# Patient Record
Sex: FEMALE | Race: WHITE | Employment: OTHER | URBAN - NONMETROPOLITAN AREA
[De-identification: names, ages, dates, MRNs, and addresses within clinical notes are randomized per-mention and may not be internally consistent; named-entity substitution may affect disease eponyms.]

---

## 2017-01-03 ENCOUNTER — TELEPHONE (OUTPATIENT)
Dept: FAMILY MEDICINE | Facility: OTHER | Age: 27
End: 2017-01-03

## 2017-11-26 ENCOUNTER — HEALTH MAINTENANCE LETTER (OUTPATIENT)
Age: 27
End: 2017-11-26

## 2017-11-29 ENCOUNTER — TRANSFERRED RECORDS (OUTPATIENT)
Dept: HEALTH INFORMATION MANAGEMENT | Facility: CLINIC | Age: 27
End: 2017-11-29

## 2017-12-06 ENCOUNTER — TRANSFERRED RECORDS (OUTPATIENT)
Dept: HEALTH INFORMATION MANAGEMENT | Facility: CLINIC | Age: 27
End: 2017-12-06

## 2018-05-13 ENCOUNTER — TRANSFERRED RECORDS (OUTPATIENT)
Dept: HEALTH INFORMATION MANAGEMENT | Facility: CLINIC | Age: 28
End: 2018-05-13

## 2018-06-13 ENCOUNTER — TRANSFERRED RECORDS (OUTPATIENT)
Dept: HEALTH INFORMATION MANAGEMENT | Facility: CLINIC | Age: 28
End: 2018-06-13

## 2018-06-13 LAB — EJECTION FRACTION: 60 %

## 2018-07-26 ENCOUNTER — TRANSFERRED RECORDS (OUTPATIENT)
Dept: HEALTH INFORMATION MANAGEMENT | Facility: CLINIC | Age: 28
End: 2018-07-26

## 2018-08-05 ENCOUNTER — TRANSFERRED RECORDS (OUTPATIENT)
Dept: HEALTH INFORMATION MANAGEMENT | Facility: CLINIC | Age: 28
End: 2018-08-05

## 2018-08-06 ENCOUNTER — HOSPITAL ENCOUNTER (INPATIENT)
Facility: HOSPITAL | Age: 28
LOS: 37 days | Discharge: HOME | DRG: 885 | End: 2018-09-12
Attending: PSYCHIATRY & NEUROLOGY | Admitting: PSYCHIATRY & NEUROLOGY
Payer: MEDICARE

## 2018-08-06 DIAGNOSIS — F25.0 SCHIZOAFFECTIVE DISORDER, BIPOLAR TYPE (H): Primary | ICD-10-CM

## 2018-08-06 PROBLEM — F30.10 MANIC BEHAVIOR (H): Status: ACTIVE | Noted: 2018-08-06

## 2018-08-06 PROCEDURE — 25000132 ZZH RX MED GY IP 250 OP 250 PS 637: Mod: GY | Performed by: NURSE PRACTITIONER

## 2018-08-06 PROCEDURE — A9270 NON-COVERED ITEM OR SERVICE: HCPCS | Mod: GY | Performed by: NURSE PRACTITIONER

## 2018-08-06 PROCEDURE — 12400000 ZZH R&B MH

## 2018-08-06 PROCEDURE — A9270 NON-COVERED ITEM OR SERVICE: HCPCS | Mod: GY

## 2018-08-06 PROCEDURE — 12400011

## 2018-08-06 PROCEDURE — 99223 1ST HOSP IP/OBS HIGH 75: CPT | Mod: AI | Performed by: NURSE PRACTITIONER

## 2018-08-06 PROCEDURE — 25000132 ZZH RX MED GY IP 250 OP 250 PS 637: Mod: GY

## 2018-08-06 RX ORDER — OLANZAPINE 10 MG/2ML
10 INJECTION, POWDER, FOR SOLUTION INTRAMUSCULAR
Status: DISCONTINUED | OUTPATIENT
Start: 2018-08-06 | End: 2018-08-10

## 2018-08-06 RX ORDER — ALUMINA, MAGNESIA, AND SIMETHICONE 2400; 2400; 240 MG/30ML; MG/30ML; MG/30ML
30 SUSPENSION ORAL EVERY 4 HOURS PRN
Status: DISCONTINUED | OUTPATIENT
Start: 2018-08-06 | End: 2018-09-12 | Stop reason: HOSPADM

## 2018-08-06 RX ORDER — HYDROXYZINE HYDROCHLORIDE 25 MG/1
25 TABLET, FILM COATED ORAL EVERY 4 HOURS PRN
Status: DISCONTINUED | OUTPATIENT
Start: 2018-08-06 | End: 2018-08-22

## 2018-08-06 RX ORDER — VITAMIN A ACETATE, .BETA.-CAROTENE, ASCORBIC ACID, CHOLECALCIFEROL, .ALPHA.-TOCOPHEROL ACETATE, DL-, THIAMINE MONONITRATE, RIBOFLAVIN, NIACINAMIDE, PYRIDOXINE HYDROCHLORIDE, FOLIC ACID, CYANOCOBALAMIN, CALCIUM CARBONATE, FERROUS FUMARATE, ZINC OXIDE, AND CUPRIC OXIDE 2000; 2000; 120; 400; 22; 1.84; 3; 20; 10; 1; 12; 200; 27; 25; 2 [IU]/1; [IU]/1; MG/1; [IU]/1; MG/1; MG/1; MG/1; MG/1; MG/1; MG/1; UG/1; MG/1; MG/1; MG/1; MG/1
1 TABLET ORAL DAILY
Status: DISCONTINUED | OUTPATIENT
Start: 2018-08-07 | End: 2018-09-12 | Stop reason: HOSPADM

## 2018-08-06 RX ORDER — OLANZAPINE 10 MG/1
10 TABLET ORAL
Status: DISCONTINUED | OUTPATIENT
Start: 2018-08-06 | End: 2018-08-10

## 2018-08-06 RX ORDER — CELECOXIB 100 MG/1
200 CAPSULE ORAL 2 TIMES DAILY
Status: DISCONTINUED | OUTPATIENT
Start: 2018-08-06 | End: 2018-09-12 | Stop reason: HOSPADM

## 2018-08-06 RX ORDER — THERMOMETER, ELECTRONIC,ORAL
EACH MISCELLANEOUS PRN
COMMUNITY

## 2018-08-06 RX ORDER — HYDROXYZINE PAMOATE 50 MG/1
50-100 CAPSULE ORAL 3 TIMES DAILY PRN
Status: DISCONTINUED | OUTPATIENT
Start: 2018-08-06 | End: 2018-09-12 | Stop reason: HOSPADM

## 2018-08-06 RX ORDER — LORATADINE 10 MG/1
10 TABLET ORAL DAILY
Status: ON HOLD | COMMUNITY
End: 2018-08-27

## 2018-08-06 RX ORDER — MUPIROCIN 20 MG/G
OINTMENT TOPICAL PRN
Status: ON HOLD | COMMUNITY
End: 2018-08-27

## 2018-08-06 RX ORDER — PRENATAL VIT/IRON FUM/FOLIC AC 27MG-0.8MG
1 TABLET ORAL DAILY
COMMUNITY

## 2018-08-06 RX ORDER — AMANTADINE HYDROCHLORIDE 100 MG/1
100 CAPSULE, GELATIN COATED ORAL ONCE
Status: COMPLETED | OUTPATIENT
Start: 2018-08-06 | End: 2018-08-06

## 2018-08-06 RX ORDER — CHLORAL HYDRATE 500 MG
1 CAPSULE ORAL 2 TIMES DAILY
COMMUNITY

## 2018-08-06 RX ORDER — PHENOBARBITAL 32.4 MG/1
32.4 TABLET ORAL 3 TIMES DAILY
Status: DISCONTINUED | OUTPATIENT
Start: 2018-08-07 | End: 2018-08-14

## 2018-08-06 RX ORDER — LORAZEPAM 1 MG/1
TABLET ORAL
Status: COMPLETED
Start: 2018-08-06 | End: 2018-08-06

## 2018-08-06 RX ORDER — LIDOCAINE/PRILOCAINE 2.5 %-2.5%
CREAM (GRAM) TOPICAL PRN
COMMUNITY

## 2018-08-06 RX ORDER — ACETAMINOPHEN 325 MG/1
650 TABLET ORAL EVERY 4 HOURS PRN
Status: DISCONTINUED | OUTPATIENT
Start: 2018-08-06 | End: 2018-09-12 | Stop reason: HOSPADM

## 2018-08-06 RX ORDER — LORAZEPAM 1 MG/1
2 TABLET ORAL ONCE
Status: COMPLETED | OUTPATIENT
Start: 2018-08-06 | End: 2018-08-06

## 2018-08-06 RX ORDER — CHLORAL HYDRATE 500 MG
1 CAPSULE ORAL 2 TIMES DAILY
Status: DISCONTINUED | OUTPATIENT
Start: 2018-08-06 | End: 2018-08-21

## 2018-08-06 RX ORDER — LAMOTRIGINE 100 MG/1
100 TABLET ORAL 2 TIMES DAILY
Status: DISCONTINUED | OUTPATIENT
Start: 2018-08-06 | End: 2018-08-09

## 2018-08-06 RX ORDER — LORATADINE 10 MG/1
10 TABLET ORAL DAILY
Status: DISCONTINUED | OUTPATIENT
Start: 2018-08-07 | End: 2018-09-12 | Stop reason: HOSPADM

## 2018-08-06 RX ADMIN — LORAZEPAM 2 MG: 1 TABLET ORAL at 21:21

## 2018-08-06 RX ADMIN — OLANZAPINE 10 MG: 10 TABLET ORAL at 12:08

## 2018-08-06 RX ADMIN — NICOTINE POLACRILEX 4 MG: 2 GUM, CHEWING ORAL at 12:08

## 2018-08-06 RX ADMIN — AMANTADINE 100 MG: 100 CAPSULE ORAL at 21:33

## 2018-08-06 RX ADMIN — NICOTINE POLACRILEX 4 MG: 2 GUM, CHEWING ORAL at 18:02

## 2018-08-06 RX ADMIN — OLANZAPINE 10 MG: 10 TABLET ORAL at 18:02

## 2018-08-06 RX ADMIN — HYDROXYZINE HYDROCHLORIDE 25 MG: 25 TABLET ORAL at 19:37

## 2018-08-06 ASSESSMENT — ACTIVITIES OF DAILY LIVING (ADL)
CURRENT_FUNCTIONAL_LEVEL_COMMENT: NONE NOTED
TRANSFERRING: 0 - INDEPENDENT
BATHING: 0 - INDEPENDENT
LAUNDRY: UNABLE TO COMPLETE
DRESS: 0 - INDEPENDENT
FALL_HISTORY_WITHIN_LAST_SIX_MONTHS: NO
PRIOR_FUNCTIONAL_LEVEL_COMMENT: NONE NOTED
BATHING: 0-->INDEPENDENT
GROOMING: INDEPENDENT
DRESS: 0-->INDEPENDENT
RETIRED_EATING: 0-->INDEPENDENT
RETIRED_COMMUNICATION: 0-->UNDERSTANDS/COMMUNICATES WITHOUT DIFFICULTY
COMMUNICATION: 0 - UNDERSTANDS/COMMUNICATES WITHOUT DIFFICULTY
AMBULATION: 0-->INDEPENDENT
EATING: 0 - INDEPENDENT
TOILETING: 0 - INDEPENDENT
ORAL_HYGIENE: INDEPENDENT
SWALLOWING: 0 - SWALLOWS FOODS/LIQUIDS WITHOUT DIFFICULTY
CHANGE_IN_FUNCTIONAL_STATUS_SINCE_ONSET_OF_CURRENT_ILLNESS/INJURY: NO
SWALLOWING: 0-->SWALLOWS FOODS/LIQUIDS WITHOUT DIFFICULTY
DRESS: SCRUBS (BEHAVIORAL HEALTH);INDEPENDENT
TOILETING: 0-->INDEPENDENT
AMBULATION: 0 - INDEPENDENT
TRANSFERRING: 0-->INDEPENDENT
COGNITION: 0 - NO COGNITION ISSUES REPORTED

## 2018-08-06 NOTE — PLAN OF CARE
"ADMISSION NOTE    Reason for admission: Psychosis.  Safety concerns: Impaired Thought Process.  Risk for or history of violence: None noted at this time.     Patient arrived on unit from [facility] in [place] accompanied by transport staff and i[employer] on 8/6/2018 at 11:32 AM.   Status on arrival: Patient irritable with pressured speech.   /96  Pulse 94  Temp 98.4  F (36.9  C) (Tympanic)  Resp 18  Ht 1.651 m (5' 5\")  Wt 97.1 kg (214 lb)  SpO2 95%  BMI 35.61 kg/m2  Patient given tour of unit and Welcome to  unit papers given to patient, wanding completed, belongings inventoried, and admission assessment completed.   Patient's legal status on arrival is 72 hour hold. Appropriate legal rights discussed with and copy given to patient. Patient Bill of Rights discussed with and copy given to patient.   Patient denies SI, HI, and thoughts of self harm and contracts for safety while on unit. Full skin assessment completed.     Celi Melo  8/6/2018  12:35 PM    Nurse to Nurse Report-  Reports that patient was brought to ER with sister and friend d/t \"risky\" behavior. History of PTSD, bipolar, ADHD, polysubstance abuse, etc. Reports that patient is hypereligious and hypersexual upon admission with sister reporting patient has been \"asking strangers on the street to come up and have sex\". Patient currently has a port in place with chemotherapy done every Monday d/t breast cancer. Urine toxicity screen positive for amphetamines with a current prescription to Vyvanse. On a 72 hour hold.     Admission Report-   Patient appears very irritable on admission with pressured loud speech. Needs redirection during initial assessment and making comments like, \"you can ask me whatever, I may or may not answer with the truth\". Full skin assessment completed with scars across breast area d/t bilateral mastectomy in April of this year. Patient also reports a broken left foot 8 months ago with some skin " "discoloration noted. Patient refusing to answer basic assessment questions at this time. States, \"apparently I'm fucking psychotic again and am losing all my rights\". Patient did not appear to be responding to internal stimuli but difficulty tracking during conversation with this writer. This writer allowed patient to express feelings at this time and she accepted offer for PRN.   1208- Received PRN Zyprexa 10 mg.   Patient continues to appear agitated with questioning. Did sign YOANA form and given meal tray, eating 100%. By 1300, patient laying in bed to rest/nap stating \"can everyone just leave me the fuck alone for a few hours\". Continue to monitor at this time.   PTA medications verified through [facility] in [place].       "

## 2018-08-06 NOTE — PROGRESS NOTES
08/06/18 1314   Patient Belongings   Did you bring any home meds/supplements to the hospital?  No    List items sent to safe: none    All other belongings put in assigned cubby in belongings room.     I have reviewed my belongings list on admission and verify that it is correct.     Patient signature_______________________________    Second staff witness (if patient unable to sign) ______________________________       I have received all my belongings at discharge.    Patient signature________________________________    Marta  8/6/2018  1:15 PM

## 2018-08-06 NOTE — IP AVS SNAPSHOT
[facility]    [address] [place] [id number]    Phone:  [phone]    Fax:  [phone]                                       After Visit Summary   8/6/2018    Marti Javier    MRN: [id number]           After Visit Summary Signature Page     I have received my discharge instructions, and my questions have been answered. I have discussed any challenges I see with this plan with the nurse or doctor.    ..........................................................................................................................................  Patient/Patient Representative Signature      ..........................................................................................................................................  Patient Representative Print Name and Relationship to Patient    ..................................................               ................................................  Date                                   Time    ..........................................................................................................................................  Reviewed by Signature/Title    ...................................................              ..............................................  Date                                               Time          22EPIC Rev 08/18
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
## 2018-08-06 NOTE — PROGRESS NOTES
08/06/18 1258   Patient Belongings   Patient Belongings clothing;keys;jewelry;tote;shoes;other (see comments)   Disposition of Belongings Locker   Belongings Search Yes   Clothing Search Yes   Second Staff Paul   General Info Comment blue printed shirt, black pants, black shoes, sunglasses, purple tank top, orange purse, watch, vape, tangled necklaces with key, black dress, living sober book, dont sweat the small stuff book, Methodist book, came to believe book, blue notebook, dailt reflections book, lines of inspiration book, 2 bibles    List items sent to safe: none    All other belongings put in assigned cubby in belongings room.     I have reviewed my belongings list on admission and verify that it is correct.     Patient signature_______________________________    Second staff witness (if patient unable to sign) ______________________________       I have received all my belongings at discharge.    Patient signature________________________________    Marta  8/6/2018  1:04 PM

## 2018-08-06 NOTE — H&P
"Psychiatric Eval/H&P    Patient Name: Marti Javier   YOB: 1990  Age: 28 year old  [id number]    Primary Physician: Kranthi Perez   Completed by CORINE Medina   through [facility]  ARHMScurrently unknown  Primary psychiatrist/NP : unknown  Therapist unknown  Family contact: [facility]          CC: \"can you all just leave me alone\"    HPI   Marti Javier is a 28 year old never   female who is ucrrently under commitment with [facility]. She was brought to the ER by her sister fabrice friend. They had reported she has been hyper Shinto and hypersexual. She had been asking strangers on the street to have sex with her. She is currently going through chemo for breast cancer. [facility] has a port and gets infusions every Monday. She had a double mastectomy in April of 2018. Recent PET imaging does not indicate that it has metastasized to other areas other than local lymph node. She and I had quite a good rapport through the years I have known her. She is very short with me today. She is irritable when I meet with her though isnt appearing as angry as annoyed in attitude. She did not tell me she was currently under commitment and that her  has plans on extening it. She is extremely gaurded and tense. Will continue prn zyprexa. Likely give risperdal tomorrow.      PMFSPH     Past Psychiatric History: she has a number of commitments and when she is under a commitment, a cruz is usually necessary. She has never attempted suicide from what I remember though I will be reviewing her past records. Her sister has often been involved with her care and had been a good support for her in the past.      Social History: she refuses to give any information today though I do recall she has never been . She does have one child whom she gave up for adoption. From what I recall, I believe a family member is raising her son who is now about 4 years old. The " last I was aware, she was allowed to have contact with him and it was an open adoption.     Chemical Use History: has  A hx of alcohol abuse as well as benzodiazapine abuse and combining the two to increase effects. thc abuse methamphetamine abuse. unknwon when last cd treatment was though when I was wokring with her 2 years ago, she had gone to [facility] in the [facility] though I do not believe she completed thr program     Family Psychiatric History: no known family suicides     Medical History and ROS    Prior to Admission medications    Medication Sig Start Date End Date Taking? Authorizing Provider   ATOMOXETINE HCL PO Take 80 mg by mouth daily   Yes Reported, Patient   CELECOXIB PO Take 200 mg by mouth 2 times daily   Yes Reported, Patient   CLONAZEPAM PO Take 1 mg by mouth 2 times daily as needed for anxiety Take 1-1 1/2 tablets PRN BID   Yes Reported, Patient   fish oil-omega-3 fatty acids 1000 MG capsule Take 1 g by mouth 2 times daily   Yes Reported, Patient   HYDROXYZINE PAMOATE PO Take  mg by mouth 3 times daily as needed for anxiety   Yes Reported, Patient   LAMOTRIGINE PO Take 100 mg by mouth 2 times daily   Yes Reported, Patient   lidocaine-prilocaine (EMLA) cream Apply topically as needed for moderate pain   Yes Reported, Patient   Lisdexamfetamine Dimesylate (VYVANSE PO) Take 60 mg by mouth daily   Yes Reported, Patient   loratadine (CLARITIN) 10 MG tablet Take 10 mg by mouth daily   Yes Reported, Patient   mupirocin (BACTROBAN) 2 % ointment Apply topically as needed   Yes Reported, Patient   Prenatal Vit-Fe Fumarate-FA (PRENATAL MULTIVITAMIN PLUS IRON) 27-0.8 MG TABS per tablet Take 1 tablet by mouth daily   Yes Reported, Patient   RANITIDINE HCL PO Take 150 mg by mouth 2 times daily   Yes Reported, Patient   risperiDONE microspheres (RISPERDAL CONSTA) 50 MG injection Inject 50 mg into the muscle every 14 days Paperwork and patient report injection due again tomorrow on 8/7/18.   Yes  Reported, Patient   tolnaftate (TINACTIN) 1 % cream Apply topically as needed for irritation   Yes Reported, Patient     Allergies   Allergen Reactions     Azithromycin Anaphylaxis     Abilify [Aripiprazole] Unknown     Wellbutrin [Bupropion] Other (See Comments)     suicidal     Zithromax [Azithromycin Dihydrate]      Past Medical History:   Diagnosis Date     Illicit drug use     + THC, counseled 10/9/12. SS referral. UDS on Admit.     Insomnia  8/29/2012     Obesity      Positive urine drug screen 11/10/2014    positive for THC and positive for THC and amphetamines 10/2013     Psychosis 1/19/2015     Schizoaffective disorder, bipolar type 8/29/2012    bipolar type with psychotic features; [facility]     Seizure 8/6/2012     Substance abuse      Tobacco abuse      Uncomplicated asthma      Past Surgical History:   Procedure Laterality Date     genitle warts removed       PE TUBES         Current Medications   Prescriptions Prior to Admission   Medication Sig Dispense Refill Last Dose     ATOMOXETINE HCL PO Take 80 mg by mouth daily        CELECOXIB PO Take 200 mg by mouth 2 times daily        CLONAZEPAM PO Take 1 mg by mouth 2 times daily as needed for anxiety Take 1-1 1/2 tablets PRN BID        fish oil-omega-3 fatty acids 1000 MG capsule Take 1 g by mouth 2 times daily        HYDROXYZINE PAMOATE PO Take  mg by mouth 3 times daily as needed for anxiety        LAMOTRIGINE PO Take 100 mg by mouth 2 times daily        lidocaine-prilocaine (EMLA) cream Apply topically as needed for moderate pain        Lisdexamfetamine Dimesylate (VYVANSE PO) Take 60 mg by mouth daily        loratadine (CLARITIN) 10 MG tablet Take 10 mg by mouth daily        mupirocin (BACTROBAN) 2 % ointment Apply topically as needed        Prenatal Vit-Fe Fumarate-FA (PRENATAL MULTIVITAMIN PLUS IRON) 27-0.8 MG TABS per tablet Take 1 tablet by mouth daily        RANITIDINE HCL PO Take 150 mg by mouth 2 times  "daily        risperiDONE microspheres (RISPERDAL CONSTA) 50 MG injection Inject 50 mg into the muscle every 14 days Paperwork and patient report injection due again tomorrow on 8/7/18.        tolnaftate (TINACTIN) 1 % cream Apply topically as needed for irritation            Past Psychiatric Medications Tried: haldol: did very well on it though had significant akathesia    Physical Exam    She is under the blanket and shows only her head. She asks to be left alone and is angry. Will defer for now.     Review of Systems:  She will not answer any questions       MSE/PSYCH  PSYCHIATRIC EXAM  /96  Pulse 94  Temp 98.4  F (36.9  C) (Tympanic)  Resp 18  Ht 1.651 m (5' 5\")  Wt 97.1 kg (214 lb)  SpO2 95%  BMI 35.61 kg/m2  -Appearance/Behavior: angry   -Motor: psychomotor retardation  -Gait: intact though staff have reported restlesness  -Abnormal involuntary movements: none noted thoughvery difficult to assess  -Mood: irritable  -Affect: flat to angry  -Speech: refuses to speak much       -Thought process/associations: Flight of ideas.  -Thought content: paranoid delusions  -Perceptual disturbances: Frequent hallucinations..              -Suicidal/Homicidal Ideation: unknown  -Judgment: Poor and Limited.  -Insight: Poor.  *Orientation: time, place and person.  *Memory: intact  Attention: poor  *Language: fluent, no aphasias, able to repeat phrases and name objects. Vocab intact.  *Fund of information: appropriate for education; poo  *Cognitive functioning estimate: 2 - very impaired.     Labs:     Cbc shows low hgh and low hematocrit. Elevated  Rdw.   ast and alt elevated. Doesn't appear that this has been a chronic issue. First abnormal lfts appear this past june. PET of liver did not indicate CA though likely elevation due to fatty liver.   tsh 2.5   care everywhere was reviewed . Multiple diagnostic studies for breast CA with lymph node involvement.   Assessment/Impression: This is a 28 year old yo female " with schizoaffecitve disorder who Is under commitment carrie through [facility]. She is well known to our unit and she has followed up with me in clinic in the past.    Educated regarding medication indications, risks, benefits, side effects, contraindications and possible interactions. Verbally expressed understanding.     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 She is under civil commitment through [facility]      Anticipated length of stay one week

## 2018-08-06 NOTE — PLAN OF CARE
Face to face end of shift report received from Celi SOTOMAYOR RN. Rounding completed. Patient observed in ICU.    Deya Lawson  8/6/2018  3:45 PM

## 2018-08-06 NOTE — PLAN OF CARE
Received phone call from patient's  Angie Barrera.  Patient is under commitment starting 2/27/2018 through [facility].  Received Findings and Order for Commitment via fax from .  This was placed in patient's paper chart.  Per , an intent to revoke and commitment extension were filed today.  Case Manger also left a voicemail with  here.

## 2018-08-06 NOTE — IP AVS SNAPSHOT
MRN:[id number]                      After Visit Summary   8/6/2018    Marti Javier    MRN: [id number]           Thank you!     Thank you for choosing [facility] for your care. Our goal is always to provide you with excellent care. Hearing back from our patients is one way we can continue to improve our services. Please take a few minutes to complete the written survey that you may receive in the mail after you visit with us. Thank you!        Patient Information     Date Of Birth          1990        Designated Caregiver       Most Recent Value    Caregiver    Will someone help with your care after discharge? no      About your hospital stay     You were admitted on:  August 6, 2018 You last received care in the:  [facility]    You were discharged on:  September 12, 2018       Who to Call     For medical emergencies, please call 911.  For non-urgent questions about your medical care, please call your primary care provider or clinic, None          Attending Provider     Provider Specialty    Derrick Jj MD Psychiatry    Haily Tomlinson APRN CNP Nurse Practitioner       Primary Care Provider Fax #    Physician No Ref-Primary [phone]      Further instructions from your care team       Behavioral Discharge Planning and Instructions    Summary: Patient was admitted with increased mental health symptoms     Main Diagnosis: schizoaffecitve disorder, bipolar type, Alcohol use disordr, amount currently used unknown, history of severe, Amphetamine and methaphetamine use disorder, 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    Symptoms to Report: feeling more aggressive, increased confusion, losing more sleep, mood getting worse or thoughts of suicide    Lifestyle Adjustment: Take all medications as prescribed, meet with doctor/  medication provider, out patient therapist, , and [facility] worker as scheduled. Abstain from alcohol or any unprescribed drugs.    Psychiatry Follow-up:     [facility]  - Angie Barrera- cell: [phone]- As arranged   [address]   [address]  [place] [address]  Phone: [phone]  Fax: [phone]    [facility]  Therapy- Yanci Damon- Sept 14th @ 9am  Medication Management- Dr. Koroma- Sept 14th @ 10:20am   [address] [place] [address]    Therapy- Phone: [phone] (Fax)    Medication Management- Phone: [phone]   Fax: [phone]    [facility]   Therapy- Currently on leave for 6 months  [address] [place] [address]  Phone: [phone]      [facility]  PCP-Lety Monsivais- September 18th @ 11:30am- Waiting room A  Phone: [phone]  Fax: [phone]    Crisis Line: [phone]    [address]   [address]   [facility] [address]    [facility]   [address] [place], [place], [address]  Phone: [phone]      Resources:   Crisis Intervention: 953.381.3026 or 117-108-3554 (TTY: 369.551.9924).  Call anytime for help.  National [place] on Mental Illness (www.[place].jeromy.org): 757.647.9547 or 553-808-4336.  Suicide Awareness Voices of Education (SAVE) (www.save.org): 833-455-UCTU (5661)  National Suicide Prevention Line (www.mentalhealthmn.org): 133-643-GCPP (8795)  Mental Health Consumer/Survivor Network of [place] (www.mhcsn.net): 330.225.2243 or 529-767-0983  Mental Health Association of [place] (www.mentalhealth.org): 935.233.6555 or 973-301-7463    General Medication Instructions:   See your medication sheet(s) for instructions.   Take all medicines as directed.  Make no changes unless your doctor suggests them.   Go to all your doctor visits.  Be sure to have all your required lab tests. This way, your medicines can be refilled on  "time.  Do not use any drugs not prescribed by your doctor.  Avoid alcohol.      [place] Area:  [facility], Crisis stabilization [facility]- [phone]  [facility] Crisis Line: 1-287.549.8001  [facility]ates [facility] Family Peace: 710-6099  Sexual Assault Program of [facility]: [phone] or 1-172.629.5237  [place] [facility]: 1-153.607.4003 Ext: 279       Calls answered Mon-Fri-8:00 am--4:30 pm    [place]:  [facility] Family Peace: 1-841.269.1397  [facility] first call for help: 1-773.667.5564  [facility] Crisis [facility]:  (422) 973-5642      [place] Area:  Warm Line: 1-273.304.5385       Calls answered Tuesday--Saturday 4:00 pm--10:00 pm  [facility] Crisis Line - [phone]  [facility] 114-155-8969    [place] Statewide:  [place] Crisis and Referral Services: 1-398.975.2740  National Suicide Prevention Lifeline: 6-183-866-TALK (3579)   - ayn7hzha- Text \"Life\" to 80765  First Call for Help: 2-1-1  [facility] Helpline- 9-179-HIKT-HELP      Pending Results     No orders found from 8/4/2018 to 8/7/2018.            Statement of Approval     Ordered          09/11/18 0841  I have reviewed and agree with all the recommendations and orders detailed in this document.  EFFECTIVE NOW     Approved and electronically signed by:  Kusum Cota NP             Admission Information     Date & Time Provider Department Dept. Phone    8/6/2018 Haily Tomlinson APRN CNP [facility] [phone]      Your Vitals Were     Blood Pressure Pulse Temperature Respirations Height Weight    136/80 98 98  F (36.7  C) (Tympanic) 18 1.651 m (5' 5\") 103 kg (227 lb 1.6 oz)    Pulse Oximetry BMI (Body Mass Index)                98% 37.79 kg/m2          MyChart Information     [employer] lets you send messages to your doctor, view your test results, renew your prescriptions, schedule appointments and more. To sign up, go to [email or web address] . Click on \"Log in\" on the left side of the " "screen, which will take you to the Welcome page. Then click on \"Sign up Now\" on the right side of the page.     You will be asked to enter the access code listed below, as well as some personal information. Please follow the directions to create your username and password.     Your access code is: [email or web address]  Expires: 2018 10:44 AM     Your access code will  in 90 days. If you need help or a new code, please call your [facility] clinic or [phone].        Care EveryWhere ID     This is your Care EveryWhere ID. This could be used by other organizations to access your [facility] medical records  [id number]        Equal Access to Services     CLARENCE JOHNSON : Milan Rodríguez, jah kay, geovanna uriarte, pihll camargo . So [facility] [phone].    ATENCIÓN: Si habla español, tiene a downey disposición servicios gratuitos de asistencia lingüística. Llame al [phone].    We comply with applicable federal civil rights laws and [place] laws. We do not discriminate on the basis of race, color, national origin, age, disability, sex, sexual orientation, or gender identity.               Review of your medicines      START taking        Dose / Directions    amantadine 100 MG capsule   Commonly known as:  SYMMETREL        Dose:  100 mg   Take 1 capsule (100 mg) by mouth 2 times daily   Quantity:  60 capsule   Refills:  0       cholecalciferol 2000 units tablet        Dose:  2000 Units   Take 2,000 Units by mouth daily   Quantity:  30 tablet   Refills:  0       cloNIDine 0.1 MG tablet   Commonly known as:  CATAPRES        Dose:  0.1 mg   Take 1 tablet (0.1 mg) by mouth 3 times daily   Quantity:  90 tablet   Refills:  0       LORazepam 1 MG tablet   Commonly known as:  ATIVAN        Dose:  2 mg   Take 2 tablets (2 mg) by mouth 3 times daily as needed for anxiety   Quantity:  60 tablet   Refills:  0       risperiDONE 1 MG ODT tab   Commonly known as:  risperDAL " M-TABS        Dose:  1 mg   Place 1 tablet (1 mg) under the tongue 3 times daily as needed (anxiety or agitation)   Quantity:  90 tablet   Refills:  0       topiramate 25 MG tablet   Commonly known as:  TOPAMAX        Dose:  25 mg   Take 1 tablet (25 mg) by mouth every 12 hours   Quantity:  60 tablet   Refills:  0         CONTINUE these medicines which may have CHANGED, or have new prescriptions. If we are uncertain of the size of tablets/capsules you have at home, strength may be listed as something that might have changed.        Dose / Directions    gabapentin 300 MG capsule   Commonly known as:  NEURONTIN   This may have changed:    - medication strength  - when to take this  - reasons to take this        Dose:  600 mg   Take 2 capsules (600 mg) by mouth 4 times daily   Quantity:  240 capsule   Refills:  0       hydrOXYzine 50 MG capsule   Commonly known as:  VISTARIL   This may have changed:  medication strength        Dose:   mg   Take 1-2 capsules ( mg) by mouth 3 times daily as needed for anxiety   Quantity:  120 capsule   Refills:  0       lamoTRIgine 200 MG tablet   Commonly known as:  LaMICtal   This may have changed:    - medication strength  - how much to take        Dose:  200 mg   Take 1 tablet (200 mg) by mouth 2 times daily   Quantity:  60 tablet   Refills:  0       risperiDONE microspheres 50 MG injection   Commonly known as:  risperDAL CONSTA   This may have changed:  additional instructions        Dose:  50 mg   Start taking on:  9/18/2018   Inject 2 mLs (50 mg) into the muscle every 14 days   Quantity:  2 each   Refills:  0         CONTINUE these medicines which have NOT CHANGED        Dose / Directions    CELECOXIB PO        Dose:  200 mg   Take 200 mg by mouth 2 times daily   Refills:  0       cyanocobalamin 1000 MCG tablet   Commonly known as:  vitamin  B-12        Dose:  1000 mcg   Take 1,000 mcg by mouth daily   Refills:  0       fish oil-omega-3 fatty acids 1000 MG capsule         Dose:  1 g   Take 1 g by mouth 2 times daily   Refills:  0       lidocaine-prilocaine cream   Commonly known as:  EMLA        Apply topically as needed for moderate pain   Refills:  0       prenatal multivitamin plus iron 27-0.8 MG Tabs per tablet        Dose:  1 tablet   Take 1 tablet by mouth daily   Refills:  0       RANITIDINE HCL PO        Dose:  150 mg   Take 150 mg by mouth 2 times daily   Refills:  0       tolnaftate 1 % cream   Commonly known as:  TINACTIN        Apply topically as needed for irritation   Refills:  0       VITAMIN B6 PO        Dose:  100 mg   Take 100 mg by mouth daily   Refills:  0         STOP taking     ATOMOXETINE HCL PO           CLONAZEPAM PO           VYVANSE PO                Where to get your medicines      These medications were sent to [id number] - [address] [address]     Phone:  [phone]     amantadine 100 MG capsule    cholecalciferol 2000 units tablet    cloNIDine 0.1 MG tablet    gabapentin 300 MG capsule    hydrOXYzine 50 MG capsule    lamoTRIgine 200 MG tablet    risperiDONE 1 MG ODT tab    risperiDONE microspheres 50 MG injection    topiramate 25 MG tablet         Some of these will need a paper prescription and others can be bought over the counter. Ask your nurse if you have questions.     Bring a paper prescription for each of these medications     LORazepam 1 MG tablet                Protect others around you: Learn how to safely use, store and throw away your medicines at www.disposemymeds.org.             Medication List: This is a list of all your medications and when to take them. Check marks below indicate your daily home schedule. Keep this list as a reference.      Medications           Morning Afternoon Evening Bedtime As Needed    amantadine 100 MG capsule   Commonly known as:  SYMMETREL   Take 1 capsule (100 mg) by mouth 2 times daily   Last time this was given:   100 mg on 9/12/2018  8:20 AM                                CELECOXIB PO   Take 200 mg by mouth 2 times daily   Last time this was given:  200 mg on 9/12/2018  8:21 AM                                cholecalciferol 2000 units tablet   Take 2,000 Units by mouth daily   Last time this was given:  2,000 Units on 9/12/2018  8:11 AM                                cloNIDine 0.1 MG tablet   Commonly known as:  CATAPRES   Take 1 tablet (0.1 mg) by mouth 3 times daily   Last time this was given:  0.1 mg on 9/12/2018  8:18 AM                                cyanocobalamin 1000 MCG tablet   Commonly known as:  vitamin  B-12   Take 1,000 mcg by mouth daily   Last time this was given:  1,000 mcg on 9/12/2018  8:21 AM                                fish oil-omega-3 fatty acids 1000 MG capsule   Take 1 g by mouth 2 times daily   Last time this was given:  1 g on 9/12/2018  8:20 AM                                gabapentin 300 MG capsule   Commonly known as:  NEURONTIN   Take 2 capsules (600 mg) by mouth 4 times daily   Last time this was given:  600 mg on 9/12/2018  8:12 AM                                hydrOXYzine 50 MG capsule   Commonly known as:  VISTARIL   Take 1-2 capsules ( mg) by mouth 3 times daily as needed for anxiety   Last time this was given:  50 mg on 9/12/2018  1:04 AM                                lamoTRIgine 200 MG tablet   Commonly known as:  LaMICtal   Take 1 tablet (200 mg) by mouth 2 times daily   Last time this was given:  175 mg on 9/12/2018  8:14 AM                                lidocaine-prilocaine cream   Commonly known as:  EMLA   Apply topically as needed for moderate pain                                LORazepam 1 MG tablet   Commonly known as:  ATIVAN   Take 2 tablets (2 mg) by mouth 3 times daily as needed for anxiety   Last time this was given:  1.5 mg on 9/11/2018  6:06 PM                                prenatal multivitamin plus iron 27-0.8 MG Tabs per tablet   Take 1 tablet by mouth  daily                                RANITIDINE HCL PO   Take 150 mg by mouth 2 times daily   Last time this was given:  150 mg on 9/12/2018  8:13 AM                                risperiDONE 1 MG ODT tab   Commonly known as:  risperDAL M-TABS   Place 1 tablet (1 mg) under the tongue 3 times daily as needed (anxiety or agitation)   Last time this was given:  1 mg on 9/12/2018  1:04 AM                                risperiDONE microspheres 50 MG injection   Commonly known as:  risperDAL CONSTA   Inject 2 mLs (50 mg) into the muscle every 14 days   Start taking on:  9/18/2018   Last time this was given:  50 mg on 9/4/2018  9:19 AM                                tolnaftate 1 % cream   Commonly known as:  TINACTIN   Apply topically as needed for irritation                                topiramate 25 MG tablet   Commonly known as:  TOPAMAX   Take 1 tablet (25 mg) by mouth every 12 hours   Last time this was given:  25 mg on 9/12/2018  8:21 AM                                VITAMIN B6 PO   Take 100 mg by mouth daily

## 2018-08-07 PROCEDURE — 12400011

## 2018-08-07 PROCEDURE — 99233 SBSQ HOSP IP/OBS HIGH 50: CPT | Performed by: NURSE PRACTITIONER

## 2018-08-07 PROCEDURE — A9270 NON-COVERED ITEM OR SERVICE: HCPCS | Mod: GY | Performed by: NURSE PRACTITIONER

## 2018-08-07 PROCEDURE — 25000132 ZZH RX MED GY IP 250 OP 250 PS 637: Mod: GY | Performed by: NURSE PRACTITIONER

## 2018-08-07 PROCEDURE — 25000128 H RX IP 250 OP 636: Performed by: NURSE PRACTITIONER

## 2018-08-07 RX ORDER — AMANTADINE HYDROCHLORIDE 100 MG/1
100 CAPSULE, GELATIN COATED ORAL 2 TIMES DAILY
Status: DISCONTINUED | OUTPATIENT
Start: 2018-08-07 | End: 2018-09-12 | Stop reason: HOSPADM

## 2018-08-07 RX ORDER — PHENOBARBITAL 32.4 MG/1
32.4 TABLET ORAL 3 TIMES DAILY
Status: DISCONTINUED | OUTPATIENT
Start: 2018-08-07 | End: 2018-08-07

## 2018-08-07 RX ORDER — GABAPENTIN 300 MG/1
600 CAPSULE ORAL 3 TIMES DAILY
Status: DISCONTINUED | OUTPATIENT
Start: 2018-08-07 | End: 2018-08-08

## 2018-08-07 RX ORDER — NICOTINE 21 MG/24HR
1 PATCH, TRANSDERMAL 24 HOURS TRANSDERMAL DAILY
Status: DISCONTINUED | OUTPATIENT
Start: 2018-08-07 | End: 2018-09-12 | Stop reason: HOSPADM

## 2018-08-07 RX ADMIN — LAMOTRIGINE 100 MG: 100 TABLET ORAL at 20:06

## 2018-08-07 RX ADMIN — LAMOTRIGINE 100 MG: 100 TABLET ORAL at 08:22

## 2018-08-07 RX ADMIN — NICOTINE POLACRILEX 4 MG: 2 GUM, CHEWING ORAL at 08:23

## 2018-08-07 RX ADMIN — PHENOBARBITAL 32.4 MG: 32.4 TABLET ORAL at 14:14

## 2018-08-07 RX ADMIN — RISPERIDONE 50 MG: KIT at 09:28

## 2018-08-07 RX ADMIN — ACETAMINOPHEN 650 MG: 325 TABLET, FILM COATED ORAL at 17:07

## 2018-08-07 RX ADMIN — HYDROXYZINE PAMOATE 100 MG: 50 CAPSULE ORAL at 19:24

## 2018-08-07 RX ADMIN — LORATADINE 10 MG: 10 TABLET ORAL at 08:22

## 2018-08-07 RX ADMIN — CELECOXIB 200 MG: 100 CAPSULE ORAL at 20:05

## 2018-08-07 RX ADMIN — GABAPENTIN 600 MG: 300 CAPSULE ORAL at 20:10

## 2018-08-07 RX ADMIN — OLANZAPINE 10 MG: 10 TABLET ORAL at 12:27

## 2018-08-07 RX ADMIN — CELECOXIB 200 MG: 100 CAPSULE ORAL at 08:22

## 2018-08-07 RX ADMIN — RANITIDINE 150 MG: 150 TABLET ORAL at 08:22

## 2018-08-07 RX ADMIN — PHENOBARBITAL 32.4 MG: 32.4 TABLET ORAL at 08:22

## 2018-08-07 RX ADMIN — NICOTINE 1 PATCH: 21 PATCH, EXTENDED RELEASE TRANSDERMAL at 10:44

## 2018-08-07 RX ADMIN — PHENOBARBITAL 32.4 MG: 32.4 TABLET ORAL at 20:05

## 2018-08-07 RX ADMIN — Medication 1 G: at 20:05

## 2018-08-07 RX ADMIN — AMANTADINE 100 MG: 100 CAPSULE ORAL at 22:52

## 2018-08-07 RX ADMIN — Medication 1 G: at 08:22

## 2018-08-07 RX ADMIN — RANITIDINE 150 MG: 150 TABLET ORAL at 20:05

## 2018-08-07 ASSESSMENT — ACTIVITIES OF DAILY LIVING (ADL)
GROOMING: INDEPENDENT
ORAL_HYGIENE: INDEPENDENT
DRESS: SCRUBS (BEHAVIORAL HEALTH)

## 2018-08-07 NOTE — PLAN OF CARE
Face to face end of shift report received from Laura ROMANO RN. Rounding completed. Patient observed laying in bed with eyes closed, left sided, non-labored breathing.     Celi Melo  8/7/2018  7:31 AM

## 2018-08-07 NOTE — PLAN OF CARE
Face to face end of shift report received from Celi SMITH RN. Rounding completed. Patient observed.     Brittny Mcpherson  8/7/2018  3:54 PM

## 2018-08-07 NOTE — PLAN OF CARE
"Problem: Patient Care Overview  Goal: Individualization & Mutuality  Patient will be compliant with treatment team recommendations.  Patient will report at least 6-8 hours of sleep each night.   8/6/18- Patient unable to wean at this time d/t unpredictable behavior. Treatment team to reassess daily.    Outcome: No Change  Patient denies SI, HI, hallucinations.  Patient states \"I know what I did before I came here and I know how it effects others.  I just don't want to be here and I want to leave.  I'm not happy about being here.\".  Patient is irritable, easily agitated, and labile this shift.  Her affect is flat.  Patient becomes increasingly agitated if staff are unable to immediately fulfill her requests.  PRN Zyprexa 10 mg po administered for agitation.  Patient makes many requests this shift.  Patient was able to call her sister and her mother this shift.  Patient requests PRNs multiple times for anxiety.  PRN hydroxyzine administered for anxiety.  Patient has menses and showered this shift.  Patient concerned that her hair will start to fall out r/t chemotherapy.    1802:  PRN Zyprexa 10 mg po administered for increasing agitation.    1937:  PRN hydroxyzine 25 mg po administered for anxiety.  2100:  Patient becoming increasingly agitated.  \"I just want my night meds.  I'm due for my Consta shot tomorrow.  I can't take that Zyprexa.  It's making me agitated.  You better get me something else.\".  Patient's eyes are open very wide.  Pupils appear of normal diameter.  Patient staring.  Call to Jenni ADEN NP with no answer.    2110:  Called to Jenni FARLEY NP, left message.  Spoke with Jenni FARLEY NP:  Telephone order Ativan 2 mg po one time administration now.    2121:  Administered Ativan 2 mg po.  2127:  Patient continues to appear highly restless, possible akithisia.  Patient laying in bed but unable to keep feet or legs still.  Telephone order per Jenni ADEN NP:  Administer Amantadine 100 mg po once now.  2133:  " "Administered amantadine 100 mg po.  2156:  Patient is sleeping but does wake self up occasionally from restlessness in legs.    2200:  Patient sleeping.  Does not appear to be restless. 2334:  Attempted to administer new ordered PTA medications.  Patient was unsure if she should take them stating she does not want to \"get in trouble because I already broke my commitment.\".  Patient states she does not want to take any of the medications because she missed her AM dose today and yesterday's doses.  She states she does not think she can start back on Lamictal 100 mg BID \"right off the bat.\".  Please don't get me in trouble.    Problem: Thought Process Alteration (Adult)  Goal: Identify Related Risk Factors and Signs and Symptoms  Patient will be compliant with medications and treatment team recommendations while on unit.    Outcome: No Change  Patient is able to request and is compliant with PRN medications.    Goal: Improved Thought Process  Patient will hold reality based conversations prior to discharge.    Outcome: No Change  Patient is able to hold reality based conversation at times and is not reality based at times.        "

## 2018-08-07 NOTE — PLAN OF CARE
"Problem: Patient Care Overview  Goal: Individualization & Mutuality  Patient will be compliant with treatment team recommendations.  Patient will report at least 6-8 hours of sleep each night.   8/6/18- Patient unable to wean at this time d/t unpredictable behavior. Treatment team to reassess daily.    Outcome: No Change  Pt has been in her room in the MHICU all shift. Pt refused medications that had been ordered at the beginning of the shift stating \"I don't want to take them but I don't want to get in trouble either\". Pt appeared to go back to sleep and has been laying on her bed most of shift with her eyes closed, regular and unlabored breathing.       "

## 2018-08-07 NOTE — PLAN OF CARE
"Problem: Patient Care Overview  Goal: Individualization & Mutuality  Patient will be compliant with treatment team recommendations.  Patient will report at least 6-8 hours of sleep each night.   8/7/18- Patient unable to wean at this time d/t unpredictable behavior. Treatment team to reassess daily.       Patient laying in bed resting/napping for first part of morning. Compliant with all scheduled medications this morning but prenatal vitamin, patient stating \"I can't swallow that without coating\". Slightly irritable with this writer at first and refusing assessment questions stating, \"I'm doing a little better today but I still feel shitty. I want to be awake today when the provider sees me\". Compliant with Risperdal Consta injection this morning but reported concern of being tired later. Clear speech and making eye contact during conversation with this writer, was not observed responding to internal stimuli. Patient remembered this writer and able to recall admission yesterday. Made personal phone calls appropriately with staff supervision.   1227- Patient requesting PRN for anxiety stating, \"I'm trying to nicely wait to see the provider and I don't want to get pissed off\". Requested/Received PRN Zyprexa 10 mg.   Weaning out to open unit appropriately at end of shift. Continue to monitor at this time.     Problem: Thought Process Alteration (Adult)  Goal: Identify Related Risk Factors and Signs and Symptoms  Patient will be compliant with medications and treatment team recommendations while on unit.    Continue to monitor at this time.   Goal: Improved Thought Process  Patient will hold reality based conversations prior to discharge.    Continue to monitor at this time.     "

## 2018-08-07 NOTE — PLAN OF CARE
Problem: Patient Care Overview  Goal: Team Discussion  Team Plan:   BEHAVIORAL TEAM DISCUSSION    Participants: Jenni Varma NP, Haily Tomlinson NP,  Amberly Sommers NP, Renetta Quinteros LSW,  Geno Hargrove LSW, Amelie Cleaning RN, Venus Lyons RN, Marilyn Irvin OT, Vanessa Diez OT  Progress: minimal- unwilling to speak with provider  Continued Stay Criteria/Rationale: Dc vyvanse, Start phenobarbital  And taper, Restart risperdal, Likely will order amantadine for akathesia  Medical/Physical: breast cancer, see H&P  Precautions:   Behavioral Orders   Procedures     Code 1 - Restrict to Unit     Routine Programming     As clinically indicated     Sexual precautions     Status 15     Every 15 minutes.     Plan: look for med/psych bed, patient is currently under commitment, CM is revoking her provisional discharge  Rationale for change in precautions or plan: none

## 2018-08-07 NOTE — PLAN OF CARE
Problem: Patient Care Overview  Goal: Individualization & Mutuality  Patient will be compliant with treatment team recommendations.  Patient will report at least 6-8 hours of sleep each night.   8/7/18- Patient able to wean as long as behavior is appropriate; must follow unit rules and cooperate with contraband searches prior to returning to [facility]ICU. . Treatment team to reassess daily.      Outcome: Therapy, progress toward functional goals is gradual  Pt. Has been weaning to open unit this shift, behavior has been appropriate, no outbursts, slept 7 hours last night, compliant with treatment team recommendations, ambulating in the halls listening to music, will continue to monitor progress.    Problem: Thought Process Alteration (Adult)  Goal: Improved Thought Process  Patient will hold reality based conversations prior to discharge.    Outcome: Therapy, progress toward functional goals is gradual  Pt. Is able to hold reality based conversation, but speech remains rapid and rambling at times.

## 2018-08-07 NOTE — PLAN OF CARE
Discharge Note    Patient Discharged to [facility] on 8/7/2018 12:08 PM via Private Car accompanied by facility staff and [facility] staff to the door.     Patient informed of discharge instructions in AVS. Patient verbalizes understanding and denies having any questions pertaining to AVS. Patient stable at time of discharge. Patient denies SI, HI, and thoughts of self harm at time of discharge. All personal belongings returned to patient. Discharge prescriptions sent to [facility] in [place] via electronic communication.     Celi Melo  8/7/2018  12:08 PM             Patient is a 89y old  Female who presents with a chief complaint of Sent by MD for elevated INR. (28 Jun 2018 02:29)      INTERVAL HPI/OVERNIGHT EVENTS:  Hospitalist follow-up for:    Bradycardia - still with episodes of bradycardia to 30s, usually during sleep hours.    Acute respiratory failure - stable on nasal cannula at 4LPM.  Has not used BIPAP since yesterday.    MEDICATIONS  (STANDING):  ALBUTerol/ipratropium for Nebulization 3 milliLiter(s) Nebulizer every 6 hours  amoxicillin  500 milliGRAM(s)/clavulanate 1 Tablet(s) Oral two times a day  BACItracin   Ointment 1 Application(s) Topical two times a day  dextrose 5%. 1000 milliLiter(s) (65 mL/Hr) IV Continuous <Continuous>  nystatin Powder 1 Application(s) Topical three times a day  pantoprazole    Tablet 40 milliGRAM(s) Oral before breakfast  predniSONE   Tablet 40 milliGRAM(s) Oral daily  QUEtiapine 25 milliGRAM(s) Oral at bedtime    MEDICATIONS  (PRN):  atropine Syringe 0.5 milliGRAM(s) IV Push once PRN symptomatic bradycrdia      Allergies    Haldol (Unknown)    Intolerances        REVIEW OF SYSTEMS:  Unable to obtain due to baseline dementia.    Vital Signs Last 24 Hrs  T(C): 36.4 (04 Jul 2018 05:59), Max: 36.4 (03 Jul 2018 20:35)  T(F): 97.5 (04 Jul 2018 05:59), Max: 97.6 (03 Jul 2018 20:35)  HR: 80 (04 Jul 2018 08:30) (44 - 80)  BP: 130/86 (04 Jul 2018 05:59) (115/35 - 148/45)  BP(mean): --  RR: 16 (04 Jul 2018 05:59) (16 - 18)  SpO2: 95% (04 Jul 2018 08:30) (93% - 100%)    PHYSICAL EXAM:  GENERAL: NAD, awake.  NERVOUS SYSTEM:   Unable to assess, not cooperative.  CHEST/LUNG: Clear anteriorly, diminished at bases.  No wheezes or rhonchi noted.  HEART: Regular rate and rhythm; No murmurs, rubs, or gallops  ABDOMEN: Soft, Nontender, Nondistended; Bowel sounds present  EXTREMITIES:   No clubbing, cyanosis, or edema      LABS:                        9.0    8.86  )-----------( 241      ( 04 Jul 2018 06:24 )             31.1     07-04    146<H>  |  107  |  15  ----------------------------<  82  3.9   |  32<H>  |  0.87    Ca    8.6      04 Jul 2018 06:24  Phos  2.7     07-03  Mg     2.0     07-03    TPro  6.8  /  Alb  2.6<L>  /  TBili  0.2  /  DBili  x   /  AST  18  /  ALT  14  /  AlkPhos  46  07-04    PT/INR - ( 04 Jul 2018 06:24 )   PT: 26.3 sec;   INR: 2.37 ratio         PTT - ( 03 Jul 2018 22:17 )  PTT:39.1 sec    CAPILLARY BLOOD GLUCOSE          RADIOLOGY & ADDITIONAL TESTS:    Imaging Personally Reviewed:  [ ] YES  [ ] NO    Consultant(s) Notes Reviewed:  [ X] YES  [ ] NO    Care Discussed with Consultants/Other Providers [ ] YES  [ ] NO

## 2018-08-07 NOTE — PLAN OF CARE
Face to face end of shift report received from Deya VAZQUEZ RN. Rounding completed. Patient observed in her room, laying on her bed. Pt not asleep. Pt refused meds that had just been ordered for her.     Laura Nascimento  8/6/2018  11:53 PM

## 2018-08-08 PROCEDURE — A9270 NON-COVERED ITEM OR SERVICE: HCPCS | Mod: GY | Performed by: NURSE PRACTITIONER

## 2018-08-08 PROCEDURE — 12400011

## 2018-08-08 PROCEDURE — 25000132 ZZH RX MED GY IP 250 OP 250 PS 637: Mod: GY | Performed by: NURSE PRACTITIONER

## 2018-08-08 PROCEDURE — 99233 SBSQ HOSP IP/OBS HIGH 50: CPT | Performed by: NURSE PRACTITIONER

## 2018-08-08 RX ORDER — GABAPENTIN 300 MG/1
600 CAPSULE ORAL 4 TIMES DAILY
Status: DISCONTINUED | OUTPATIENT
Start: 2018-08-08 | End: 2018-09-12 | Stop reason: HOSPADM

## 2018-08-08 RX ADMIN — OLANZAPINE 10 MG: 10 TABLET ORAL at 14:43

## 2018-08-08 RX ADMIN — GABAPENTIN 600 MG: 300 CAPSULE ORAL at 13:12

## 2018-08-08 RX ADMIN — GABAPENTIN 600 MG: 300 CAPSULE ORAL at 19:10

## 2018-08-08 RX ADMIN — PHENOBARBITAL 32.4 MG: 32.4 TABLET ORAL at 08:11

## 2018-08-08 RX ADMIN — PHENOBARBITAL 32.4 MG: 32.4 TABLET ORAL at 21:13

## 2018-08-08 RX ADMIN — RANITIDINE 150 MG: 150 TABLET ORAL at 08:11

## 2018-08-08 RX ADMIN — LORATADINE 10 MG: 10 TABLET ORAL at 08:11

## 2018-08-08 RX ADMIN — GABAPENTIN 600 MG: 300 CAPSULE ORAL at 08:11

## 2018-08-08 RX ADMIN — VITAMIN A, VITAMIN C, VITAMIN D-3, VITAMIN E, VITAMIN B-1, VITAMIN B-2, NIACIN, VITAMIN B-6, CALCIUM, IRON, ZINC, COPPER 1 TABLET: 4000; 120; 400; 22; 1.84; 3; 20; 10; 1; 12; 200; 27; 25; 2 TABLET ORAL at 08:11

## 2018-08-08 RX ADMIN — Medication 1 G: at 19:12

## 2018-08-08 RX ADMIN — HYDROXYZINE PAMOATE 100 MG: 50 CAPSULE ORAL at 14:01

## 2018-08-08 RX ADMIN — NICOTINE POLACRILEX 4 MG: 2 GUM, CHEWING ORAL at 14:42

## 2018-08-08 RX ADMIN — AMANTADINE 100 MG: 100 CAPSULE ORAL at 19:10

## 2018-08-08 RX ADMIN — Medication 1 G: at 08:10

## 2018-08-08 RX ADMIN — MAGNESIUM HYDROXIDE 30 ML: 400 SUSPENSION ORAL at 19:12

## 2018-08-08 RX ADMIN — AMANTADINE 100 MG: 100 CAPSULE ORAL at 08:11

## 2018-08-08 RX ADMIN — LAMOTRIGINE 100 MG: 100 TABLET ORAL at 08:11

## 2018-08-08 RX ADMIN — OLANZAPINE 10 MG: 10 TABLET ORAL at 00:01

## 2018-08-08 RX ADMIN — CELECOXIB 200 MG: 100 CAPSULE ORAL at 08:11

## 2018-08-08 RX ADMIN — NICOTINE POLACRILEX 2 MG: 2 GUM, CHEWING ORAL at 10:58

## 2018-08-08 RX ADMIN — RANITIDINE 150 MG: 150 TABLET ORAL at 19:11

## 2018-08-08 RX ADMIN — CELECOXIB 200 MG: 100 CAPSULE ORAL at 19:12

## 2018-08-08 RX ADMIN — GABAPENTIN 600 MG: 300 CAPSULE ORAL at 16:55

## 2018-08-08 RX ADMIN — PHENOBARBITAL 32.4 MG: 32.4 TABLET ORAL at 13:12

## 2018-08-08 RX ADMIN — ACETAMINOPHEN 650 MG: 325 TABLET, FILM COATED ORAL at 00:01

## 2018-08-08 ASSESSMENT — ACTIVITIES OF DAILY LIVING (ADL)
GROOMING: INDEPENDENT
LAUNDRY: UNABLE TO COMPLETE
ORAL_HYGIENE: INDEPENDENT
DRESS: INDEPENDENT
ORAL_HYGIENE: INDEPENDENT
DRESS: SCRUBS (BEHAVIORAL HEALTH);INDEPENDENT
GROOMING: INDEPENDENT

## 2018-08-08 NOTE — PLAN OF CARE
"Problem: Patient Care Overview  Goal: Individualization & Mutuality  Patient will be compliant with treatment team recommendations.  Patient will report at least 6-8 hours of sleep each night.   8/8/18- Patient able to wean as long as behavior is appropriate; must follow unit rules and cooperate with contraband searches prior to returning to MHICU. . Treatment team to reassess daily.      Patient will remain appropriate with shoes out on the open unit and will return shoes to staff prior to returning to the MHICU.    Pt ambulating in MHICU upon arrival, mood is calm, affect is blunt/flat, compliant with nursing assessment et medication administration, denies SI/HI ideation, hallucinations et anxiety. Relief with prn zyprexa administration, appropriate behaviors with staff et peers on open unit. Returned to room for nap after dinner. Adequate food et fluid intake.     2256: Pt rested in bed for 45 minutes, returned to open unit, s/s of anxiety post group, in a raised tone of voice, fast speech, \"I hate when I\"m around all these people, it just triggers everything!!!!!! I need my pills....!! Pt began yelling out, \"I'm bleeding everywhere, because I've been waiting for my pills, \"I don't feel safe back here.\" Pt easily redirectable et apologetic post angry outburst. Out on the open unit majority of shift; tolerated well. Compliant with staff requests to return to MH-ICU.     Problem: Thought Process Alteration (Adult)  Goal: Improved Thought Process  Patient will hold reality based conversations prior to discharge.    Majority of conversation is reality based. Refused Scheduled HS Lamictal, stating, \"That will so kill me!!, I will only take 25 mg, not 100!!! Pt refused education. Statements, \"I don't want to be back here, I don't feel safe with that man et he smells like ass.\" \"I don't trust him.     "

## 2018-08-08 NOTE — PLAN OF CARE
Problem: Patient Care Overview  Goal: Team Discussion  Team Plan:   BEHAVIORAL TEAM DISCUSSION    Participants: Kusum Cota NP, Toya Wolf NP, Renetta Quinteros LSW,  Geno Hargrove LSW, Edie Eugene RN, Venus Lyons RN, Anayeli Jaimes RN, Haily Fuchs OT  Progress: good   Continued Stay Criteria/Rationale: continue with current medications    Medical/Physical: breast cancer - double mastectomy in April   Precautions:   Behavioral Orders   Procedures     Code 1 - Restrict to Unit     Routine Programming     As clinically indicated     Sexual precautions     Status 15     Every 15 minutes.     Plan: trying to transfer patient to a medical psych bed due to needing chemo treatments every Monday   Rationale for change in precautions or plan: none

## 2018-08-08 NOTE — PLAN OF CARE
Face to face end of shift report received from Laura ROMANO RN. Rounding completed. Patient observed resting in bed.     Raheel Wallace  8/8/2018  7:37 AM

## 2018-08-08 NOTE — PLAN OF CARE
Problem: Patient Care Overview  Goal: Individualization & Mutuality  Patient will be compliant with treatment team recommendations.  Patient will report at least 6-8 hours of sleep each night.   8/7/18- Patient able to wean as long as behavior is appropriate; must follow unit rules and cooperate with contraband searches prior to returning to MHICU. . Treatment team to reassess daily.      Outcome: No Change  Pt has been resting in bed at the beginning of this shift. She did get up to eat her breakfast and take her medications. She has remained calm and cooperative with staff this AM. She continues to ramble at times during a conversation but has not made any delusional statements to this writer. She denied HI/SI and hallucinations to this writer. She did admit to anxiety this AM. Will continue to monitor.    Pt did wean out to the open unit for the majority of this shift without any issues. Pt was cooperative with safety checks when returning to the MHICU. Pt also has been using her shoes while out on the open unit due to c/o feet pain and has remained appropriate with these.     Problem: Thought Process Alteration (Adult)  Goal: Improved Thought Process  Patient will hold reality based conversations prior to discharge.    Outcome: No Change  Pt is able to carry on reality based conversations.

## 2018-08-08 NOTE — PLAN OF CARE
Problem: Patient Care Overview  Goal: Discharge Needs Assessment  Outcome: Improving  Spoke with pt's JORDON Adams this morning who states she will be faxing over the revocation and extension order. Angie asks if the NP contacted the [place] regarding medical psych bed so pt can get chemo treatments- Informed [facility] staff provided NP with contact info but will follow up- NP today states she will contact [place]. Angie states when pt has stabilized she can discharge home to her apartment and follow up with her current MH services. Angie says her goal would be for pt to move to a group home-  she has talked with pt in the past about this but states it can be worked on when pt returns home.     Received call from [facility] this afternoon stating they received pt's revocation order and are requesting Facesheet, H&P, MAR, and any notes as pt will be added to the [facility] list- Faxed requested info.

## 2018-08-08 NOTE — PROGRESS NOTES
"[facility]  Psychiatric Progress Note      Impression:   After reviewing her chart for yesterday evening, I did see that she was recently diagnosed with breast cancer with double mastectomy and is undergoing chemo treatments.  She has only a few chemotherapy treatments left.  From what the notes and PET scan shows, it does not appear that there was metastasis he is other than adjacent lymph nodes possibly.  She has been going through a lot of stress with chemotherapy as well as being under civil commitment over the past year.  She tells me that \"I started having a breakdown when I saw my son a couple of weeks ago and things just continued to spiral down.\".  She gave her son up for adoption and her cousins adopted him.  He is approximately 4-5 years old she has always felt.  Very uneasy about not being able to safely raise her son and has had guilt about the adoption.  She states she started then becoming \"psychotic\" and things kept getting worse.  He does not elaborate well on her symptoms of psychosis when I asked her if she was hearing voices she laughs and says \"not now\" she has always been sarcastic..  She does not appear to be overly preoccupied and does not seem extremely distracted by any hallucinations and in the past, I have seen her much worse.     I did tell her that I are eating disorders and more psychotic today and she is currently it sounds she was I am not.  Her sister reported that she was propositioning people that she did not know to come to her apartment to have sex with her.  This is not out of character for her when she is manic though when she returns to her average, stable, mood, she is not hypersexual.  When I told her that she laughs with that she needs to move away from the [place] and wants to go back to the area and insinuates moving back here would make her less hypersexual.  She is not suicidal.  She tells me she does not want to die.  She questions if she wants to " "finish her chemotherapy treatments.  I ask her if she thought they were necessary for her health and she shrugged her shoulders as if she was not sure.  I did tell her that the doctor would not be ordering them if they were not important to her health.  She did tell me that she understands that, does wants to live, will finish the chemo therapy if it is what is recommended.  I asked her if she has been  Hospitalized during this admission for mental health.  She tells me that she has not been hospitalized during this commitment and states \"I was in the emergency room for maybe 24 hours and then got better so they let me go\".  When she is manic and has psychosis, I do not recall that she clears quickly.  In fact I do recall taking quite a few weeks to have delusional thoughts decrease in agitation level down.  I then questioned if she had improved quickly because possibly she was using too many stimulants   Which led to an increase in laura that cleared more with sleep.  She denied that she abuses Vyvanse until she does not have a history of abusing it.  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  She does state she was diagnosed as a child and was on Concerta for ADHD as a child but states it did not help.  I did tell her that I have read information stating that amphetamines such as Adderall based medications have higher risk of triggering laura and medications based on methylphenidate.  I question whether her physician is suspicious of any substance abuse though the [place] prescription monitoring site does not look concerning for multiple doctors or early refills.  She states that she wants to return to her apartment [place] [facility] though she states she is aware that her commitment has been revoked and extended and is not happy about that.  She states that her  is not contemplating [facility] and would like her to return back to her apartment once she is " stable.  I have not spoke with her  will leave the  has.  She is currently not working with an ACT team who has worked with ours in the past and that would be the minimum outpatient care she should have if the plan is indeed for her to return home to her apartment.  do recall her having significant akathisia specifically with Haldol and last night she was very restless and agitation increased.  I did order 1 dose of amantadine and she states that that medication helped greatly for her restlessness.  She states the Haldol is much marked akathisia than the Risperdal and she also had significant abnormal mouth movements while on Haldol and mouth movements have decreased by 75% from what I can recall though it has been a few years since I have seen her.       Educated regarding medication indications, risks, benefits, side effects, contraindications and possible interactions. Verbally expressed understanding.        DIagnoses:         Attestation:  Patient has been seen and evaluated by me, Jenni Varma NP, in the presence of the house staff team          Interim History:   The patient's care was discussed with the treatment team and chart notes were reviewed.          Medications:       amantadine  100 mg Oral BID     celecoxib  200 mg Oral BID     fish oil-omega-3 fatty acids  1 g Oral BID     gabapentin  600 mg Oral TID     lamoTRIgine (LaMICtal) tablet 100 mg  100 mg Oral BID     loratadine  10 mg Oral Daily     nicotine  1 patch Transdermal Daily     nicotine   Transdermal Q8H     [START ON 8/8/2018] nicotine   Transdermal Daily     PHENobarbital  32.4 mg Oral TID     PNV PRENATAL PLUS MULTIVITAMIN  1 tablet Oral Daily     ranitidine  150 mg Oral BID     risperiDONE microspheres  50 mg Intramuscular Q14 Days              10 point ROS negative though did miss one chemo treatment.        Allergies:     Allergies   Allergen Reactions     Azithromycin Anaphylaxis     Abilify  "[Aripiprazole] Unknown     Wellbutrin [Bupropion] Other (See Comments)     suicidal     Zithromax [Azithromycin Dihydrate]             Psychiatric Examination:   /96  Pulse 114  Temp 99.2  F (37.3  C) (Tympanic)  Resp 15  Ht 1.651 m (5' 5\")  Wt 97.1 kg (214 lb)  SpO2 98%  BMI 35.61 kg/m2  Weight is 214 lbs 0 oz  Body mass index is 35.61 kg/(m^2).    Appearance:  awake, alert, slightly unkempt and disheveled   Attitude:  evasive at times though mostly cooperative  Eye Contact:  intense  Mood:  anxious  Affect:  fixed mobility and guarded  Speech:  clear, coherent and pressured speech  Psychomotor Behavior:  abnormal jaw movments though these are much better than they were three years ago when on haldol  Thought Process:  circumstantial  Associations:  loosening of associations present  Thought Content:  no evidence of suicidal ideation or homicidal ideation and auditory hallucinations present  Insight:  limited  Judgment:  limited  Oriented to:  time, person, and place  Attention Span and Concentration:  fair  Recent and Remote Memory:  fair  Fund of Knowledge: appropriate  Muscle Strength and Tone: normal  Gait and Station: Normal             Labs:     Results for orders placed or performed in visit on 03/30/16   XR CHEST 2 VW    Narrative    XR CHEST 2 VW  3/30/2016 10:26 AM     HISTORY:  Gamma interferon antigen response without active  tuberculosis.      Impression    IMPRESSION: Negative chest.    ALEXA ROWE MD                Plan:   Did restart gabapentin at 600 tid (had been off for few days) likely increaes it quickly as she will likely tolerate it well. She was on 600 qid  Amantadine for akathesia was ordered  Considered lyrica for anxiety and pain though her insurnace wouldn't cover it. Could be idea to try to rx it again  She did get her risperdal consta today  I did not start her vyvanse. It should be looked into further and diuscussed with her primary psychiatrist,  and " other support members of her care team prior to starting to see if any of them have concerns she is misuing it.     Order hep c testing  Iron and iron binding capacity to be ordered due to akathesia.

## 2018-08-08 NOTE — PLAN OF CARE
Problem: Patient Care Overview  Goal: Individualization & Mutuality  Patient will be compliant with treatment team recommendations.  Patient will report at least 6-8 hours of sleep each night.   8/7/18- Patient able to wean as long as behavior is appropriate; must follow unit rules and cooperate with contraband searches prior to returning to MHICU. . Treatment team to reassess daily.      Outcome: No Change  Pt was up at the beginning of the shift. Pt complaining of pain in her ankle stating that she broke it 8 months ago and it never healed correctly. Pt also complaining of being in the MHICU, pt asked to come out and run on the treadmill to burn off some of her extra energy. Pt was told that nursing would have to check on the rules regarding using the tread mill at night. Pt asked about her PRNs and said she needed something for pain and anxiety. Nursing came back within 10 minutes and let her know her prns for pain and anxiety. When asked to rate her pain in her ankle before getting acetaminophen 650mg, pt stated that her ankle was ok now but her wrist was hurting her but was unable to put a number on it. Pt also requested zyprexa 10mg at 0001. Pt went to bed at this time and appears to have been sleeping since.

## 2018-08-08 NOTE — PLAN OF CARE
Face to face end of shift report received from Brittny COBOS RN. Rounding completed. Patient observed in the MHICU pacing in unit. Pt upset about her pain in her ankle, need to burn off some of her energy and wanting to run on treadmill and asks to know what PRNs she has available to her.      Laura Nascimento  8/8/2018  12:07 AM

## 2018-08-08 NOTE — PROGRESS NOTES
[facility]  Psychiatric Progress Note      Impression:   Did review chart and she does get chemo once weekly at [place]. Eufemia reports she has only three or four more infusions left then will start radiation treatment. Eufemia is quite clear today. She is able to have a logical and linear conversation with me regarding her health care. She would like to get closer to her oncologist and finish the chemotherapy. Following that she does say she would be fine with having radiation treatments in [place] if she were to relocate to this area. Eufemia feels she is doing better today than she has been previously.   Did contact [facility] to inquire about possible transfer for additional services including chemotherapy. They reported she is not currently receiving chemo in their system so it is not an appropriate transfer. Did contact [facility] and they do not provide medical psych beds so they will not accept her there. She currently gets her chemo at [facility]. Did contact Angie her , left a message to help coordinate or facilitate care for eufemia.     Educated regarding medication indications, risks, benefits, side effects, contraindications and possible interactions. Verbally expressed understanding.        DIagnoses:         Attestation:  Patient has been seen and evaluated by me, Kusum Cota NP, in the presence of the house staff team          Interim History:   The patient's care was discussed with the treatment team and chart notes were reviewed.          Medications:       amantadine  100 mg Oral BID     celecoxib  200 mg Oral BID     fish oil-omega-3 fatty acids  1 g Oral BID     gabapentin  600 mg Oral TID     lamoTRIgine (LaMICtal) tablet 100 mg  100 mg Oral BID     loratadine  10 mg Oral Daily     nicotine  1 patch Transdermal Daily     nicotine   Transdermal Q8H     nicotine   Transdermal Daily     PHENobarbital  32.4 mg Oral TID     PNV PRENATAL PLUS MULTIVITAMIN  1 tablet  "Oral Daily     ranitidine  150 mg Oral BID     risperiDONE microspheres  50 mg Intramuscular Q14 Days              10 point ROS negative though did miss one chemo treatment.        Allergies:     Allergies   Allergen Reactions     Azithromycin Anaphylaxis     Abilify [Aripiprazole] Unknown     Wellbutrin [Bupropion] Other (See Comments)     suicidal     Zithromax [Azithromycin Dihydrate]             Psychiatric Examination:   BP (!) 148/101  Pulse (!) 132  Temp 99.1  F (37.3  C) (Tympanic)  Resp 16  Ht 1.651 m (5' 5\")  Wt 97.1 kg (214 lb)  SpO2 97%  BMI 35.61 kg/m2  Weight is 214 lbs 0 oz  Body mass index is 35.61 kg/(m^2).    Appearance:  awake, alert, slightly unkempt and disheveled   Attitude:  evasive at times though mostly cooperative  Eye Contact:  intense  Mood:  anxious  Affect:  fixed mobility and guarded  Speech:  clear, coherent and pressured speech  Psychomotor Behavior:  abnormal jaw movments though these are much better than they were three years ago when on haldol  Thought Process:  circumstantial  Associations:  loosening of associations present  Thought Content:  no evidence of suicidal ideation or homicidal ideation and auditory hallucinations present  Insight:  limited  Judgment:  limited  Oriented to:  time, person, and place  Attention Span and Concentration:  fair  Recent and Remote Memory:  fair  Fund of Knowledge: appropriate  Muscle Strength and Tone: normal  Gait and Station: Normal             Labs:     Results for orders placed or performed in visit on 03/30/16   XR CHEST 2 VW    Narrative    XR CHEST 2 VW  3/30/2016 10:26 AM     HISTORY:  Gamma interferon antigen response without active  tuberculosis.      Impression    IMPRESSION: Negative chest.    ALEXA ROWE MD                Plan:   Increase gabapentin tomorrow to 600 four times daily  Continue current medications      "

## 2018-08-09 ENCOUNTER — TELEPHONE (OUTPATIENT)
Dept: BEHAVIORAL HEALTH | Facility: CLINIC | Age: 28
End: 2018-08-09

## 2018-08-09 PROCEDURE — 25000132 ZZH RX MED GY IP 250 OP 250 PS 637: Mod: GY | Performed by: NURSE PRACTITIONER

## 2018-08-09 PROCEDURE — 99232 SBSQ HOSP IP/OBS MODERATE 35: CPT | Performed by: NURSE PRACTITIONER

## 2018-08-09 PROCEDURE — A9270 NON-COVERED ITEM OR SERVICE: HCPCS | Mod: GY | Performed by: NURSE PRACTITIONER

## 2018-08-09 PROCEDURE — 12400011

## 2018-08-09 RX ORDER — LAMOTRIGINE 25 MG/1
25 TABLET ORAL DAILY
Status: DISCONTINUED | OUTPATIENT
Start: 2018-08-10 | End: 2018-08-21

## 2018-08-09 RX ADMIN — RANITIDINE 150 MG: 150 TABLET ORAL at 09:18

## 2018-08-09 RX ADMIN — NICOTINE POLACRILEX 2 MG: 2 GUM, CHEWING ORAL at 13:20

## 2018-08-09 RX ADMIN — PHENOBARBITAL 32.4 MG: 32.4 TABLET ORAL at 13:20

## 2018-08-09 RX ADMIN — GABAPENTIN 600 MG: 300 CAPSULE ORAL at 13:19

## 2018-08-09 RX ADMIN — OLANZAPINE 10 MG: 10 TABLET ORAL at 23:01

## 2018-08-09 RX ADMIN — RANITIDINE 150 MG: 150 TABLET ORAL at 20:01

## 2018-08-09 RX ADMIN — HYDROXYZINE PAMOATE 100 MG: 50 CAPSULE ORAL at 15:30

## 2018-08-09 RX ADMIN — PHENOBARBITAL 32.4 MG: 32.4 TABLET ORAL at 20:00

## 2018-08-09 RX ADMIN — CELECOXIB 200 MG: 100 CAPSULE ORAL at 20:00

## 2018-08-09 RX ADMIN — Medication 1 G: at 20:00

## 2018-08-09 RX ADMIN — LORATADINE 10 MG: 10 TABLET ORAL at 10:40

## 2018-08-09 RX ADMIN — GABAPENTIN 600 MG: 300 CAPSULE ORAL at 20:00

## 2018-08-09 RX ADMIN — NICOTINE POLACRILEX 2 MG: 2 GUM, CHEWING ORAL at 10:41

## 2018-08-09 RX ADMIN — NICOTINE POLACRILEX 4 MG: 2 GUM, CHEWING ORAL at 19:10

## 2018-08-09 RX ADMIN — CELECOXIB 200 MG: 100 CAPSULE ORAL at 09:18

## 2018-08-09 RX ADMIN — VITAMIN A, VITAMIN C, VITAMIN D-3, VITAMIN E, VITAMIN B-1, VITAMIN B-2, NIACIN, VITAMIN B-6, CALCIUM, IRON, ZINC, COPPER 1 TABLET: 4000; 120; 400; 22; 1.84; 3; 20; 10; 1; 12; 200; 27; 25; 2 TABLET ORAL at 09:19

## 2018-08-09 RX ADMIN — AMANTADINE 100 MG: 100 CAPSULE ORAL at 09:19

## 2018-08-09 RX ADMIN — ACETAMINOPHEN 650 MG: 325 TABLET, FILM COATED ORAL at 15:30

## 2018-08-09 RX ADMIN — NICOTINE POLACRILEX 4 MG: 2 GUM, CHEWING ORAL at 05:30

## 2018-08-09 RX ADMIN — AMANTADINE 100 MG: 100 CAPSULE ORAL at 20:00

## 2018-08-09 RX ADMIN — GABAPENTIN 600 MG: 300 CAPSULE ORAL at 09:19

## 2018-08-09 RX ADMIN — Medication 1 G: at 09:19

## 2018-08-09 RX ADMIN — NICOTINE POLACRILEX 2 MG: 2 GUM, CHEWING ORAL at 12:16

## 2018-08-09 RX ADMIN — OLANZAPINE 10 MG: 10 TABLET ORAL at 00:52

## 2018-08-09 RX ADMIN — GABAPENTIN 600 MG: 300 CAPSULE ORAL at 16:51

## 2018-08-09 RX ADMIN — PHENOBARBITAL 32.4 MG: 32.4 TABLET ORAL at 09:19

## 2018-08-09 RX ADMIN — ACETAMINOPHEN 650 MG: 325 TABLET, FILM COATED ORAL at 21:42

## 2018-08-09 RX ADMIN — HYDROXYZINE PAMOATE 100 MG: 50 CAPSULE ORAL at 20:01

## 2018-08-09 RX ADMIN — NICOTINE 1 PATCH: 21 PATCH, EXTENDED RELEASE TRANSDERMAL at 15:50

## 2018-08-09 ASSESSMENT — ACTIVITIES OF DAILY LIVING (ADL)
ORAL_HYGIENE: INDEPENDENT
GROOMING: INDEPENDENT
LAUNDRY: UNABLE TO COMPLETE
DRESS: SCRUBS (BEHAVIORAL HEALTH);INDEPENDENT

## 2018-08-09 NOTE — PLAN OF CARE
Problem: Patient Care Overview  Goal: Individualization & Mutuality  Patient will be compliant with treatment team recommendations.  Patient will report at least 6-8 hours of sleep each night.   8/9/18- Patient able to wean as long as behavior is appropriate; must follow unit rules and cooperate with contraband searches prior to returning to MHICU. . Treatment team to reassess daily.      Patient will remain appropriate with shoes out on the open unit and will return shoes to staff prior to returning to the MHICU.     Outcome: Improving  Patient tolerating weaning to general unit. Withdrawn, declines groups, spending time pacing and listening to headphones. She has remained calm and cooperative with staff this AM. Medication complaint with all medications as scheduled, expect for Lamictal 100 mg daily. States she has been off it for several days and knows it would be harmful for her to restart it at that dose. NP notified. Patient continues to ramble at times during conversation, but able to hold a logical and linear conversation - no delusional content noted. She denies SI, HI, thoughts of self harm and hallucinations.    Patient's room moved to general unit. Lamictal changed to 25 mg daily - to start tomorrow AM. Centralized pre-admissions called for nurse to nurse report. They have concerns about how patient would receive chemo treatments at their facilities.      Problem: Thought Process Alteration (Adult)  Goal: Improved Thought Process  Patient will hold reality based conversations prior to discharge.    Outcome: Improving  Patient able to have a logical and linear conversation - no delusional content noted.

## 2018-08-09 NOTE — PLAN OF CARE
Problem: Patient Care Overview  Goal: Individualization & Mutuality  Patient will be compliant with treatment team recommendations.  Patient will report at least 6-8 hours of sleep each night.   8/8/18- Patient able to wean as long as behavior is appropriate; must follow unit rules and cooperate with contraband searches prior to returning to MHICU. . Treatment team to reassess daily.      Patient will remain appropriate with shoes out on the open unit and will return shoes to staff prior to returning to the MHICU.    Outcome: No Change  Pt was up at the beginning of the shift. Pt was agitated for having to be in the back. Pt stated that she is being punished. Pt was assured that she would be able to wean out during the day again as long as she followed recommendations. Pt requested something for sleep and agitation. Pt rated her pain in her foot as moderate, not wanting to give it a number. Pt states her ankles are swollen. Pt also stated her anxiety was high rating it a 7/10. Pt was given 10mg of Zyprexa at 0001. Pt layed on bed but did not go to sleep. She then requested water which was brought to her room. Pt was up at 0500, pt complaining that she should not be here, that she is not suicidal or a danger to anyone. Pt did request nicotine gum at this time. (0530)

## 2018-08-09 NOTE — PLAN OF CARE
"Problem: Patient Care Overview  Goal: Individualization & Mutuality  Patient will be compliant with treatment team recommendations.  Patient will report at least 6-8 hours of sleep each night.   8/9/18- Patient able to wean as long as behavior is appropriate; must follow unit rules and cooperate with contraband searches prior to returning to MHICU. . Treatment team to reassess daily.      Patient will remain appropriate with shoes out on the open unit and will return shoes to staff prior to returning to the MHICU.     Outcome: No Change  Patient denies SI, HI, hallucinations.  Patient endorses high anxiety and \"restlessness\".  Patient requested and received PRN Vistaril 100 mg po @ 1530& 2001  She also c/o 4 out of 10 generalized aching pain.  Requested and received PRN tylenol 650 mg po @ 1530.  Patient is pacing the hallways listening to headphones.  Her affect is flat.  Her mood is labile and irritable.  Patient is fixated on Celebrex being increased to QID and having Lyrica available to her.  Her speech is rapid and pressured.  Patient attends minimal groups this shift.  Patient fixated on her 72 hour hold.  She does not seem to understand that she is still committed.  Patient requesting to call her CM's personal number or that this writer call her tonight.  This was not done.  Patient seems to think \"some staff tell me I'm ready to leave.  I just want to smoke.\".  Offered patient PRN Zyprexa multiple times this shift for agitation.  This was refused.  Will continue to monitor.     1530:  PRN Vistaril 100 mg po administered for high anxiety.  PRN tylenol 650 mg po administered for 4 out of 10 generalized pain.    2001:  PRN Vistaril 100 mg po administered for anxiety.  2142:  PRN tylenol 650 mg po administered for 5 out of 10 generalized pain.  2301:  Patient agreed to take and was administered PRN Zyprexa 10 mg po for increasing agitation.      Problem: Thought Process Alteration (Adult)  Goal: Identify Related " Risk Factors and Signs and Symptoms  Patient will be compliant with medications and treatment team recommendations while on unit.    Outcome: No Change  Patient compliant with medications.  Goal: Improved Thought Process  Patient will hold reality based conversations prior to discharge.    Outcome: No Change  Patient able to hold reality based conversation.

## 2018-08-09 NOTE — PLAN OF CARE
Face to face end of shift report received from Amalia CASTELLANOS RN. Rounding completed. Patient observed.     Deya Lawson  8/9/2018  3:24 PM

## 2018-08-09 NOTE — TELEPHONE ENCOUNTER
S: Pt is a 29 yo female in the [facility] needing chemo therapy, which isn't provided in [place].    B: Pt has had provisional discharge revoked. Pt is on the [place] [place] [facility] unit. Kusum (nurse practitioner [phone]) is working with this pt. Pt is a priority on the list for [facility]. Pt has 3 treatments of Taxol left which are done once a week on Mondays. This will be followed by radiation treatment.  Pt has missed one treatment since being in [place]. Pt came from [facility] in [place] and did not notify unit that pt was on chemo. Pt noted to be irritable at times but otherwise no behavioral concerns. Pt is medically clear and stable according to Kusum.     A: Pt does have a history of MRSA but no open sores noted.   A: Pt's Oncologist is dr. Vaughan, Phone#[phone]  A: PT's infusion center is based out of [facility] in [facility], Phone#[phone]    R: Spoke with Sommer CHERY She stated that we cannot accomodate a patient going through chemotherapy. Recommended to go to a Medical unit. Notified Kusum SÁNCHEZ that they would have to look at getting her to a medical unit due to Mental health units not being able to take patients on chemo.

## 2018-08-09 NOTE — PLAN OF CARE
Face to face end of shift report received from MITCHELL Sanchez. Rounding completed. Patient observed.     Amalia Barrera  8/9/2018  [id number]

## 2018-08-09 NOTE — PROGRESS NOTES
[facility]  Psychiatric Progress Note      Impression:   Spoke with Marti Slater's oncologist. He is concerned about her chemotherapy and that she not miss further doses. He is willing to speak with any provider for continuity of care. He did leave his phone number as well as the number of the Infusion center. Spoke with Dr. Tillman about continuing care here and this is not likely as this is not done on an inpatient basis and there is no one here to manage the medication or care. Did call Intake for [facility], coordinating a possible transfer of care. Marti updated on this information. She does not want to be her e and does not understand why she cannot go to [facility]. She does not feel her PD should be revoked if she came in voluntarily.  Marti demonstrates little insight to her mental health wellbeing. She is however cooperative with assessment and programming. She does want ot finish her cancer treatments and does have a concern about missing them.     Dr. Vaughan reports she has three infusions left of taxol and then will begin radiation therapy.     Educated regarding medication indications, risks, benefits, side effects, contraindications and possible interactions. Verbally expressed understanding.        DIagnoses:         Attestation:  Patient has been seen and evaluated by me, Kusum Cota NP, in the presence of the house staff team          Interim History:   The patient's care was discussed with the treatment team and chart notes were reviewed.          Medications:       amantadine  100 mg Oral BID     celecoxib  200 mg Oral BID     fish oil-omega-3 fatty acids  1 g Oral BID     gabapentin  600 mg Oral 4x Daily     [START ON 8/10/2018] lamoTRIgine (LaMICtal) tablet 25 mg  25 mg Oral Daily     loratadine  10 mg Oral Daily     nicotine  1 patch Transdermal Daily     nicotine   Transdermal Q8H     nicotine   Transdermal Daily     PHENobarbital  32.4 mg Oral TID  "    PNV PRENATAL PLUS MULTIVITAMIN  1 tablet Oral Daily     ranitidine  150 mg Oral BID     risperiDONE microspheres  50 mg Intramuscular Q14 Days              10 point ROS negative though did miss one chemo treatment.        Allergies:     Allergies   Allergen Reactions     Azithromycin Anaphylaxis     Abilify [Aripiprazole] Unknown     Wellbutrin [Bupropion] Other (See Comments)     suicidal     Zithromax [Azithromycin Dihydrate]             Psychiatric Examination:   /94  Pulse 93  Temp 97.6  F (36.4  C) (Tympanic)  Resp 18  Ht 1.651 m (5' 5\")  Wt 97.1 kg (214 lb)  SpO2 94%  BMI 35.61 kg/m2  Weight is 214 lbs 0 oz  Body mass index is 35.61 kg/(m^2).    Appearance:  awake, alert, slightly unkempt and disheveled   Attitude:  evasive at times though mostly cooperative  Eye Contact:  intense  Mood:  anxious  Affect:  fixed mobility and guarded  Speech:  clear, coherent and pressured speech  Psychomotor Behavior:  abnormal jaw movments though these are much better than they were three years ago when on haldol  Thought Process:  circumstantial  Associations:  loosening of associations present  Thought Content:  no evidence of suicidal ideation or homicidal ideation and auditory hallucinations present  Insight:  limited  Judgment:  limited  Oriented to:  time, person, and place  Attention Span and Concentration:  fair  Recent and Remote Memory:  fair  Fund of Knowledge: appropriate  Muscle Strength and Tone: normal  Gait and Station: Normal             Labs:     Results for orders placed or performed in visit on 03/30/16   XR CHEST 2 VW    Narrative    XR CHEST 2 VW  3/30/2016 10:26 AM     HISTORY:  Gamma interferon antigen response without active  tuberculosis.      Impression    IMPRESSION: Negative chest.    ALEXA ROWE MD                Plan:   Will try to transfer care to an appropriate facility that can accommodate chemotherapy and mental health.   Continue current medications      "

## 2018-08-09 NOTE — PLAN OF CARE
Problem: Patient Care Overview  Goal: Team Discussion  Team Plan:   BEHAVIORAL TEAM DISCUSSION    Participants: Kusum Cota NP, Toya Wolf NP, Renetta Quinteros LSW,  Geno Hargrove LSW, Edie Eugene RN, Amelie Cleaning RN, Venus Lyons RN, Haily Fuchs OT  Progress: refusing lamictal,   Continued Stay Criteria/Rationale: continue to stabilize and med adjustments as needed  Medical/Physical: chemo treatment   Precautions:   Behavioral Orders   Procedures     Code 1 - Restrict to Unit     Routine Programming     As clinically indicated     Sexual precautions     Status 15     Every 15 minutes.     Plan: looking for a med/psyc bed, set up infusion therapy on the unit  Rationale for change in precautions or plan:

## 2018-08-09 NOTE — PLAN OF CARE
Face to face end of shift report received from Steff CASTELLANOS RN. Rounding completed. Patient observed laying in his bed with eyes closed.     Laura Nascimento  8/8/2018  11:49 PM

## 2018-08-10 DIAGNOSIS — C50.412 MALIGNANT NEOPLASM OF UPPER-OUTER QUADRANT OF LEFT BREAST IN FEMALE, ESTROGEN RECEPTOR POSITIVE (H): ICD-10-CM

## 2018-08-10 DIAGNOSIS — Z17.0 MALIGNANT NEOPLASM OF UPPER-OUTER QUADRANT OF LEFT BREAST IN FEMALE, ESTROGEN RECEPTOR POSITIVE (H): ICD-10-CM

## 2018-08-10 PROCEDURE — 25000132 ZZH RX MED GY IP 250 OP 250 PS 637: Mod: GY | Performed by: NURSE PRACTITIONER

## 2018-08-10 PROCEDURE — 12400011

## 2018-08-10 PROCEDURE — A9270 NON-COVERED ITEM OR SERVICE: HCPCS | Mod: GY | Performed by: NURSE PRACTITIONER

## 2018-08-10 PROCEDURE — 99232 SBSQ HOSP IP/OBS MODERATE 35: CPT | Performed by: NURSE PRACTITIONER

## 2018-08-10 RX ORDER — RISPERIDONE 1 MG/1
1 TABLET, ORALLY DISINTEGRATING ORAL 2 TIMES DAILY PRN
Status: DISCONTINUED | OUTPATIENT
Start: 2018-08-10 | End: 2018-08-26

## 2018-08-10 RX ADMIN — CELECOXIB 200 MG: 100 CAPSULE ORAL at 08:14

## 2018-08-10 RX ADMIN — Medication 1 G: at 20:13

## 2018-08-10 RX ADMIN — CELECOXIB 200 MG: 100 CAPSULE ORAL at 20:13

## 2018-08-10 RX ADMIN — LORATADINE 10 MG: 10 TABLET ORAL at 08:14

## 2018-08-10 RX ADMIN — Medication 1 G: at 08:14

## 2018-08-10 RX ADMIN — NICOTINE POLACRILEX 4 MG: 2 GUM, CHEWING ORAL at 15:04

## 2018-08-10 RX ADMIN — GABAPENTIN 600 MG: 300 CAPSULE ORAL at 20:13

## 2018-08-10 RX ADMIN — GABAPENTIN 600 MG: 300 CAPSULE ORAL at 08:15

## 2018-08-10 RX ADMIN — HYDROXYZINE PAMOATE 100 MG: 50 CAPSULE ORAL at 15:38

## 2018-08-10 RX ADMIN — RANITIDINE 150 MG: 150 TABLET ORAL at 08:15

## 2018-08-10 RX ADMIN — MAGNESIUM HYDROXIDE 30 ML: 400 SUSPENSION ORAL at 18:02

## 2018-08-10 RX ADMIN — ACETAMINOPHEN 650 MG: 325 TABLET, FILM COATED ORAL at 21:44

## 2018-08-10 RX ADMIN — NICOTINE 1 PATCH: 21 PATCH, EXTENDED RELEASE TRANSDERMAL at 08:15

## 2018-08-10 RX ADMIN — HYDROXYZINE PAMOATE 100 MG: 50 CAPSULE ORAL at 10:29

## 2018-08-10 RX ADMIN — PHENOBARBITAL 32.4 MG: 32.4 TABLET ORAL at 13:03

## 2018-08-10 RX ADMIN — ACETAMINOPHEN 650 MG: 325 TABLET, FILM COATED ORAL at 10:29

## 2018-08-10 RX ADMIN — GABAPENTIN 600 MG: 300 CAPSULE ORAL at 12:00

## 2018-08-10 RX ADMIN — AMANTADINE 100 MG: 100 CAPSULE ORAL at 08:15

## 2018-08-10 RX ADMIN — AMANTADINE 100 MG: 100 CAPSULE ORAL at 20:13

## 2018-08-10 RX ADMIN — RISPERIDONE 1 MG: 1 TABLET, ORALLY DISINTEGRATING ORAL at 14:10

## 2018-08-10 RX ADMIN — OLANZAPINE 10 MG: 10 TABLET ORAL at 08:51

## 2018-08-10 RX ADMIN — NICOTINE POLACRILEX 4 MG: 2 GUM, CHEWING ORAL at 14:10

## 2018-08-10 RX ADMIN — PHENOBARBITAL 32.4 MG: 32.4 TABLET ORAL at 20:13

## 2018-08-10 RX ADMIN — GABAPENTIN 600 MG: 300 CAPSULE ORAL at 17:01

## 2018-08-10 RX ADMIN — RANITIDINE 150 MG: 150 TABLET ORAL at 20:13

## 2018-08-10 RX ADMIN — NICOTINE POLACRILEX 4 MG: 2 GUM, CHEWING ORAL at 18:01

## 2018-08-10 RX ADMIN — LAMOTRIGINE 25 MG: 25 TABLET ORAL at 08:14

## 2018-08-10 RX ADMIN — PHENOBARBITAL 32.4 MG: 32.4 TABLET ORAL at 08:15

## 2018-08-10 RX ADMIN — NICOTINE POLACRILEX 4 MG: 2 GUM, CHEWING ORAL at 08:37

## 2018-08-10 ASSESSMENT — ACTIVITIES OF DAILY LIVING (ADL)
GROOMING: INDEPENDENT
ORAL_HYGIENE: INDEPENDENT
LAUNDRY: UNABLE TO COMPLETE
DRESS: SCRUBS (BEHAVIORAL HEALTH);INDEPENDENT
GROOMING: INDEPENDENT
LAUNDRY: UNABLE TO COMPLETE
ORAL_HYGIENE: INDEPENDENT
DRESS: SCRUBS (BEHAVIORAL HEALTH);INDEPENDENT

## 2018-08-10 NOTE — PLAN OF CARE
Problem: Patient Care Overview  Goal: Individualization & Mutuality  Patient will be compliant with treatment team recommendations.  Patient will report at least 6-8 hours of sleep each night.   Patient will remain appropriate with shoes out on the open unit and will return shoes to staff prior to returning to the MHICU.     Patient observed in bed and appeared to be asleep all of NOC shift thus far. Eyes closed and non-labored respirations noted. 15 minute safety and PRN checks done throughout the shift and position changes noted. Will continue to monitor and document any changes.  0627: Pt continues to sleep. Slept about 7 hours.

## 2018-08-10 NOTE — PLAN OF CARE
Face to face end of shift report received from Deya Lawson RN. Rounding completed. Patient observed.     Rina Lunsford  8/9/2018  11:30 PM

## 2018-08-10 NOTE — PLAN OF CARE
Problem: Patient Care Overview  Goal: Team Discussion  Team Plan:   BEHAVIORAL TEAM DISCUSSION    Participants: Kusum Cota NP,Michael Aranda NP, Toya Wolf NP,  Renetta Quinteros LSW,  Geno Hargrove LSW, Edie Eugene RN, Paul Shepard RN, Marilyn Irvin OT, Vanessa Diez OT  Progress: refusing lamictal, Withdrawn, declines groups, and pacing.  Continued Stay Criteria/Rationale: continue to stabilize and med adjustments as needed  Medical/Physical: chemo treatment   Precautions:   Behavioral Orders   Procedures     Code 1 - Restrict to Unit     Routine Programming     As clinically indicated     Sexual precautions     Status 15     Every 15 minutes.     Plan: Pt. To start chemotherapy here next week.    Rationale for change in precautions or plan: none

## 2018-08-10 NOTE — PLAN OF CARE
8-10-18  Patient is to have 1:1 staffing along with  to chemotherapy appointment Monday August 13th, 2018 at [facility]. Patient will need to present to Medical Oncology at 1000 for labs. Patient will see Dr. García in Medical Oncology at 1030. Patient then will go to the infusion center on the 3rd floor after seeing Dr. García for her chemotherapy infusion. Patient will be accompanied by behavioral health staff at all times.

## 2018-08-10 NOTE — PLAN OF CARE
Face to face end of shift report received from iRna JOSHI RN. Rounding completed. Patient observed in [facility].     Estephania Shepard  8/10/2018  7:54 AM

## 2018-08-10 NOTE — PLAN OF CARE
"Problem: Patient Care Overview  Goal: Individualization & Mutuality  Patient will be compliant with treatment team recommendations.  Patient will report at least 6-8 hours of sleep each night.    Outcome: No Change  Patient denies SI, HI.  She endorses a high level of anxiety, received PRN Vistaril.  \"The anxiety must be caused from the barbiturate I'm taking.  Plus this isn't the best facility for me.\".  Patient is fixated on discharging to her brother's house in [place].  Her insight is poor.  Patient is irritable, mood is labile and tense, and affect is flat.  Patient is disheveled but appears to have showered today.  She is withdrawn to her room at the beginning of this shift.   1538:  PRN Vistaril 100 mg po administered for high level of anxiety.  1802:  PRN milk of magnesia 30 mL administered for c/o constipation.    1946:  T 99.9, P 122, R 18, /102, 02 98% on RA.  Betty NP notified.  No new orders, monitor, encourage fluid intake, notify NP with worsening VS.  2012: T 97.9, P 104, /98  2123: T 98.4, P 112, R 14, /95, 02 97% on RA.  Patient states she feels better and apologizes for \"being crabby today.\".      Problem: Thought Process Alteration (Adult)  Goal: Identify Related Risk Factors and Signs and Symptoms  Patient will be compliant with medications and treatment team recommendations while on unit.    Outcome: No Change  Patient does not agree with being in this facility and wants to discharge to her brother's house in [place].    Goal: Improved Thought Process  Patient will hold reality based conversations prior to discharge.    Outcome: No Change  Patient able to hold reality based conversation.  She is fixated on discharge.       "

## 2018-08-10 NOTE — PLAN OF CARE
"Problem: Patient Care Overview  Goal: Discharge Needs Assessment  Outcome: No Change  Spoke with CPA this morning who states they are going to contact pt's JORDON Adams regarding a plan to ensure pt receives mental health and chemo treatments.     Spoke with pt again this afternoon and went over plan of staying in this hospital for 3 weeks while she finishes her chemo treatments. Pt is tearful and states she doesn't even know if she wants to complete her treatments because she is confined in the hospital and isn't thinking clearly- She knows her body and she feels she is free from cancer. Talks about her rights being violated and she is upset that she isn't able to leave the hospital even though she came in voluntarily. Pt repeatedly states \"I want my day in court\"- explained to pt she went to court for her commitment but she can speak with her  regarding a new hearing. Pt states even though she is having a bad day and is upset about feeling like she has no say in her treatment plan she knows that the staff are nice in the hospital and are trying to care for her.   "

## 2018-08-10 NOTE — PROGRESS NOTES
[facility]  Psychiatric Progress Note      Impression:   Eufemia is set to meet with Dr. Be on Monday prior to continuing with chemotherapy here. At this time, eufemia is failing to understand that she is here on a revocation of her provisional discharge. Eufemia feels that she dos not have  Cancer anymore but that her cortisol levels are increased and that is not good. She is rambling and tangential. She has poor insight to her mental health issues. Eufemia has difficulty tracking a conversation and with remembering what ws discussed. She is currently agreeing to a PRN oral dose of risperdal, will consider adding a scheduled daily oral dose as she may benefit from this. Olanzapine discontinued as a PRN as risperdal binds too tightly to the D2 receptor thereby rendering olanzapine ineffective.      Educated regarding medication indications, risks, benefits, side effects, contraindications and possible interactions. Verbally expressed understanding.        DIagnoses:         Attestation:  Patient has been seen and evaluated by me, Kusum Cota NP, in the presence of the house staff team          Interim History:   The patient's care was discussed with the treatment team and chart notes were reviewed.          Medications:       amantadine  100 mg Oral BID     celecoxib  200 mg Oral BID     fish oil-omega-3 fatty acids  1 g Oral BID     gabapentin  600 mg Oral 4x Daily     lamoTRIgine (LaMICtal) tablet 25 mg  25 mg Oral Daily     loratadine  10 mg Oral Daily     nicotine  1 patch Transdermal Daily     nicotine   Transdermal Q8H     nicotine   Transdermal Daily     PHENobarbital  32.4 mg Oral TID     PNV PRENATAL PLUS MULTIVITAMIN  1 tablet Oral Daily     ranitidine  150 mg Oral BID     risperiDONE microspheres  50 mg Intramuscular Q14 Days              10 point ROS negative though did miss one chemo treatment.        Allergies:     Allergies   Allergen Reactions     Azithromycin Anaphylaxis      "Abilify [Aripiprazole] Unknown     Wellbutrin [Bupropion] Other (See Comments)     suicidal     Zithromax [Azithromycin Dihydrate]             Psychiatric Examination:   /97  Pulse 115  Temp 98.7  F (37.1  C) (Tympanic)  Resp 16  Ht 1.651 m (5' 5\")  Wt 97.1 kg (214 lb)  SpO2 95%  BMI 35.61 kg/m2  Weight is 214 lbs 0 oz  Body mass index is 35.61 kg/(m^2).    Appearance:  awake, alert, slightly unkempt and disheveled   Attitude:  evasive at times though mostly cooperative  Eye Contact:  intense  Mood:  anxious  Affect:  fixed mobility and guarded  Speech:  clear, coherent and pressured speech  Psychomotor Behavior:  abnormal jaw movments though these are much better than they were three years ago when on haldol  Thought Process:  circumstantial  Associations:  loosening of associations present  Thought Content:  no evidence of suicidal ideation or homicidal ideation and auditory hallucinations present  Insight:  limited  Judgment:  limited  Oriented to:  time, person, and place  Attention Span and Concentration:  fair  Recent and Remote Memory:  fair  Fund of Knowledge: appropriate  Muscle Strength and Tone: normal  Gait and Station: Normal             Labs:     Results for orders placed or performed in visit on 03/30/16   XR CHEST 2 VW    Narrative    XR CHEST 2 VW  3/30/2016 10:26 AM     HISTORY:  Gamma interferon antigen response without active  tuberculosis.      Impression    IMPRESSION: Negative chest.    ALEXA ROWE MD                Plan:   Will try to transfer care to an appropriate facility that can accommodate chemotherapy and mental health.   Continue current medications      "

## 2018-08-10 NOTE — PLAN OF CARE
Face to face end of shift report received from Estephania ABAD RN. Rounding completed. Patient observed in her room.    Deya Lawson  8/10/2018  4:38 PM

## 2018-08-10 NOTE — PLAN OF CARE
"Problem: Patient Care Overview  Goal: Individualization & Mutuality  Patient will be compliant with treatment team recommendations.  Patient will report at least 6-8 hours of sleep each night.    Outcome: No Change  Patient is tense, irritable, and demanding this shift. Tells this writer right away during the shift that we are violating her rights and that she needs to leave. Refusing to accept that she is committed. Making frequent requests for staff to call her  and her \"\" to get her discharged. Told patient that staff would not be making these calls for her. Patient reminded of appropriate behaviors for open unit and that if she is unable to maintain appropriate behavior or she would be moved to MHICU. Patient did say she would be able to maintain behavior. Received PRN Zyprexa 10 mg at 0851 for agitation.   Received PRN Vistaril 100 mg at 1029 for increased anxiety. Received PRN Tylenol 650 mg at 1029 for back pain.   Refused AM scheduled prenatal vitamin due to it not being \"sugar coated\". Restless this shift.   Patient telling this writer several times that this writer knows her brother even though this staff does not. Patient telling this writer that \"another lie form you then\". Does not appear to accept that this writer does not know her or her family members.   Making frequent random requests such as \"I have a broken foot so I need to sit down to use the phone so you need to fix this or get me a private phone\". Again reminded of appropriate behaviors and to utilize coping skills and attend groups to help decrease anxiety and frustration.   Received PRN Risperidone 1 mg at 1410 for increased anxiety and agitation. Patient made comments \"so she didn't even order what I wanted\". Reminded patient that this writer was in the room and that she stated Risperidone does help for decreased anxiety and agitation. Patient then goes on requesting Ativan. Told patient this will not be ordered for " "her and that no medications is an \"instant fix\" and she needs to take responsibility for her own behavior and utilize coping skills. Patient tells this writer \"oh Ativan is an instant fix\" then walked away.       Problem: Thought Process Alteration (Adult)  Goal: Improved Thought Process  Patient will hold reality based conversations prior to discharge.    Outcome: No Change  Patient tangential and rambling this shift.       "

## 2018-08-11 PROCEDURE — A9270 NON-COVERED ITEM OR SERVICE: HCPCS | Mod: GY | Performed by: NURSE PRACTITIONER

## 2018-08-11 PROCEDURE — 25000132 ZZH RX MED GY IP 250 OP 250 PS 637: Mod: GY | Performed by: NURSE PRACTITIONER

## 2018-08-11 PROCEDURE — 12400011

## 2018-08-11 RX ORDER — CLONIDINE HYDROCHLORIDE 0.1 MG/1
0.1 TABLET ORAL 3 TIMES DAILY PRN
Status: DISCONTINUED | OUTPATIENT
Start: 2018-08-11 | End: 2018-08-23

## 2018-08-11 RX ADMIN — HYDROXYZINE PAMOATE 100 MG: 50 CAPSULE ORAL at 19:34

## 2018-08-11 RX ADMIN — AMANTADINE 100 MG: 100 CAPSULE ORAL at 20:21

## 2018-08-11 RX ADMIN — PHENOBARBITAL 32.4 MG: 32.4 TABLET ORAL at 08:20

## 2018-08-11 RX ADMIN — CELECOXIB 200 MG: 100 CAPSULE ORAL at 08:19

## 2018-08-11 RX ADMIN — Medication 1 G: at 08:20

## 2018-08-11 RX ADMIN — AMANTADINE 100 MG: 100 CAPSULE ORAL at 08:20

## 2018-08-11 RX ADMIN — CELECOXIB 200 MG: 100 CAPSULE ORAL at 20:21

## 2018-08-11 RX ADMIN — GABAPENTIN 600 MG: 300 CAPSULE ORAL at 20:21

## 2018-08-11 RX ADMIN — LORATADINE 10 MG: 10 TABLET ORAL at 08:20

## 2018-08-11 RX ADMIN — PHENOBARBITAL 32.4 MG: 32.4 TABLET ORAL at 13:09

## 2018-08-11 RX ADMIN — GABAPENTIN 600 MG: 300 CAPSULE ORAL at 13:09

## 2018-08-11 RX ADMIN — NICOTINE POLACRILEX 4 MG: 2 GUM, CHEWING ORAL at 19:34

## 2018-08-11 RX ADMIN — PHENOBARBITAL 32.4 MG: 32.4 TABLET ORAL at 20:21

## 2018-08-11 RX ADMIN — ACETAMINOPHEN 650 MG: 325 TABLET, FILM COATED ORAL at 18:26

## 2018-08-11 RX ADMIN — NICOTINE POLACRILEX 4 MG: 2 GUM, CHEWING ORAL at 21:22

## 2018-08-11 RX ADMIN — NICOTINE POLACRILEX 4 MG: 2 GUM, CHEWING ORAL at 16:27

## 2018-08-11 RX ADMIN — RANITIDINE 150 MG: 150 TABLET ORAL at 20:21

## 2018-08-11 RX ADMIN — GABAPENTIN 600 MG: 300 CAPSULE ORAL at 08:20

## 2018-08-11 RX ADMIN — RANITIDINE 150 MG: 150 TABLET ORAL at 08:20

## 2018-08-11 RX ADMIN — MAGNESIUM HYDROXIDE 30 ML: 400 SUSPENSION ORAL at 20:21

## 2018-08-11 RX ADMIN — GABAPENTIN 600 MG: 300 CAPSULE ORAL at 16:27

## 2018-08-11 RX ADMIN — NICOTINE 1 PATCH: 21 PATCH, EXTENDED RELEASE TRANSDERMAL at 08:20

## 2018-08-11 RX ADMIN — NICOTINE POLACRILEX 4 MG: 2 GUM, CHEWING ORAL at 13:14

## 2018-08-11 RX ADMIN — Medication 1 G: at 20:21

## 2018-08-11 RX ADMIN — VITAMIN A, VITAMIN C, VITAMIN D-3, VITAMIN E, VITAMIN B-1, VITAMIN B-2, NIACIN, VITAMIN B-6, CALCIUM, IRON, ZINC, COPPER 1 TABLET: 4000; 120; 400; 22; 1.84; 3; 20; 10; 1; 12; 200; 27; 25; 2 TABLET ORAL at 08:20

## 2018-08-11 RX ADMIN — LAMOTRIGINE 25 MG: 25 TABLET ORAL at 08:20

## 2018-08-11 ASSESSMENT — ACTIVITIES OF DAILY LIVING (ADL)
GROOMING: INDEPENDENT
LAUNDRY: UNABLE TO COMPLETE
GROOMING: INDEPENDENT
DRESS: SCRUBS (BEHAVIORAL HEALTH);INDEPENDENT
LAUNDRY: UNABLE TO COMPLETE
ORAL_HYGIENE: INDEPENDENT
ORAL_HYGIENE: INDEPENDENT
DRESS: SCRUBS (BEHAVIORAL HEALTH)

## 2018-08-11 NOTE — PLAN OF CARE
Face to face end of shift report received from Deya VAZQUEZ RN. Rounding completed. Patient observed.     Zakia Javier  8/11/2018  12:00 AM

## 2018-08-11 NOTE — PLAN OF CARE
Problem: Patient Care Overview  Goal: Individualization & Mutuality  Patient will be compliant with treatment team recommendations.  Patient will report at least 6-8 hours of sleep each night.    Outcome: No Change  Pt has been in bed with eyes closed and regular respirations X 7 hours. 15 minute and PRN checks all night. No complaints offered. Will continue to monitor.      Zakia Javier  8/11/2018  4:34 AM

## 2018-08-11 NOTE — PLAN OF CARE
Face to face end of shift report received from Emma MCGOWAN RN. Rounding completed. Patient observed in [facility].    Deya Lawson  8/11/2018  3:49 PM

## 2018-08-11 NOTE — PLAN OF CARE
Problem: Patient Care Overview  Goal: Individualization & Mutuality  Patient will be compliant with treatment team recommendations.  Patient will report at least 6-8 hours of sleep each night.    Outcome: No Change  Pt ate well for breakfast and now c/o being tired  Denies depression or SI  States slept well last night but still wants to sleep  Took all meds without complaint

## 2018-08-11 NOTE — PLAN OF CARE
"Problem: Patient Care Overview  Goal: Individualization & Mutuality  Patient will be compliant with treatment team recommendations.  Patient will report at least 6-8 hours of sleep each night.    Outcome: No Change  Patient compliant with medications. She denies SI, HI, hallucinations.  She endorse's anxiety r/t being here.  She states she feels better this evening compared to yesterday evening.  Affect is slightly brighter than yesterday.  Will continue to monitor.    1826:  PRN tylenol 650 mg po administered for 5 out of 10 foot pain.  She states this is from walking hallways today \"becuase I think I have restless legs\".  Patient states she used to take Vyvanse to slow herself down \"but I have addiction issues so that's probably not a good idea.  What ever I take my body is going to crave and I'm going to need it then.\".    2021:  PRN milk of mag 30 mL po administered for c/o constipation.      Problem: Thought Process Alteration (Adult)  Goal: Identify Related Risk Factors and Signs and Symptoms  Patient will be compliant with medications and treatment team recommendations while on unit.    Outcome: No Change  Patient compliant with medications.   Goal: Improved Thought Process  Patient will hold reality based conversations prior to discharge.    Outcome: No Change  Patient able to hold reality based conversation.      "

## 2018-08-11 NOTE — PLAN OF CARE
Face to face end of shift report received from Lacy RN Rounding completed. Patient observed.     Emma Jaimes  8/11/2018  0800AM

## 2018-08-12 PROCEDURE — A9270 NON-COVERED ITEM OR SERVICE: HCPCS | Mod: GY | Performed by: NURSE PRACTITIONER

## 2018-08-12 PROCEDURE — 99232 SBSQ HOSP IP/OBS MODERATE 35: CPT | Performed by: NURSE PRACTITIONER

## 2018-08-12 PROCEDURE — 25000132 ZZH RX MED GY IP 250 OP 250 PS 637: Mod: GY | Performed by: NURSE PRACTITIONER

## 2018-08-12 PROCEDURE — 12400011

## 2018-08-12 RX ORDER — LORAZEPAM 0.5 MG/1
0.5 TABLET ORAL EVERY 4 HOURS PRN
Status: DISCONTINUED | OUTPATIENT
Start: 2018-08-12 | End: 2018-08-12

## 2018-08-12 RX ORDER — LORAZEPAM 0.5 MG/1
0.5 TABLET ORAL DAILY PRN
Status: DISCONTINUED | OUTPATIENT
Start: 2018-08-12 | End: 2018-08-14

## 2018-08-12 RX ADMIN — CELECOXIB 200 MG: 100 CAPSULE ORAL at 08:31

## 2018-08-12 RX ADMIN — NICOTINE 1 PATCH: 21 PATCH, EXTENDED RELEASE TRANSDERMAL at 08:29

## 2018-08-12 RX ADMIN — GABAPENTIN 600 MG: 300 CAPSULE ORAL at 16:18

## 2018-08-12 RX ADMIN — LAMOTRIGINE 25 MG: 25 TABLET ORAL at 08:32

## 2018-08-12 RX ADMIN — GABAPENTIN 600 MG: 300 CAPSULE ORAL at 08:31

## 2018-08-12 RX ADMIN — CELECOXIB 200 MG: 100 CAPSULE ORAL at 20:16

## 2018-08-12 RX ADMIN — GABAPENTIN 600 MG: 300 CAPSULE ORAL at 20:16

## 2018-08-12 RX ADMIN — LORAZEPAM 0.5 MG: 0.5 TABLET ORAL at 17:14

## 2018-08-12 RX ADMIN — CLONIDINE HYDROCHLORIDE 0.1 MG: 0.1 TABLET ORAL at 16:18

## 2018-08-12 RX ADMIN — RANITIDINE 150 MG: 150 TABLET ORAL at 08:32

## 2018-08-12 RX ADMIN — AMANTADINE 100 MG: 100 CAPSULE ORAL at 08:35

## 2018-08-12 RX ADMIN — Medication 1 G: at 08:30

## 2018-08-12 RX ADMIN — RANITIDINE 150 MG: 150 TABLET ORAL at 20:16

## 2018-08-12 RX ADMIN — AMANTADINE 100 MG: 100 CAPSULE ORAL at 20:16

## 2018-08-12 RX ADMIN — LORATADINE 10 MG: 10 TABLET ORAL at 08:31

## 2018-08-12 RX ADMIN — GABAPENTIN 600 MG: 300 CAPSULE ORAL at 12:18

## 2018-08-12 RX ADMIN — Medication 1 G: at 20:16

## 2018-08-12 RX ADMIN — PHENOBARBITAL 32.4 MG: 32.4 TABLET ORAL at 13:07

## 2018-08-12 RX ADMIN — PHENOBARBITAL 32.4 MG: 32.4 TABLET ORAL at 08:32

## 2018-08-12 RX ADMIN — VITAMIN A, VITAMIN C, VITAMIN D-3, VITAMIN E, VITAMIN B-1, VITAMIN B-2, NIACIN, VITAMIN B-6, CALCIUM, IRON, ZINC, COPPER 1 TABLET: 4000; 120; 400; 22; 1.84; 3; 20; 10; 1; 12; 200; 27; 25; 2 TABLET ORAL at 08:30

## 2018-08-12 RX ADMIN — NICOTINE POLACRILEX 4 MG: 2 GUM, CHEWING ORAL at 17:15

## 2018-08-12 RX ADMIN — PHENOBARBITAL 32.4 MG: 32.4 TABLET ORAL at 20:16

## 2018-08-12 ASSESSMENT — ACTIVITIES OF DAILY LIVING (ADL)
ORAL_HYGIENE: INDEPENDENT
ORAL_HYGIENE: INDEPENDENT
GROOMING: INDEPENDENT
DRESS: SCRUBS (BEHAVIORAL HEALTH);INDEPENDENT
DRESS: SCRUBS (BEHAVIORAL HEALTH)
LAUNDRY: UNABLE TO COMPLETE
GROOMING: INDEPENDENT

## 2018-08-12 NOTE — PROGRESS NOTES
[facility]  Psychiatric Progress Note      Impression:   Marti is up and about in her room today. She is brighter ad more cooperative with assessment and understanding of the treatment plan. She does ask about the doctor she will be seeing. Assured her that he has communicated with Dr. Vaughan. She is happy about that. She is more clear and logical today. She la shave a great deal of anxiety about the appointment and her cancer diagnosis. I do not believe it is unreasonable to offer a small dose of lorazepam once daily pete a controlled environment prior to the meeting with her new oncologist and chemotherapy.     Educated regarding medication indications, risks, benefits, side effects, contraindications and possible interactions. Verbally expressed understanding.        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          Medications:       amantadine  100 mg Oral BID     celecoxib  200 mg Oral BID     fish oil-omega-3 fatty acids  1 g Oral BID     gabapentin  600 mg Oral 4x Daily     lamoTRIgine (LaMICtal) tablet 25 mg  25 mg Oral Daily     loratadine  10 mg Oral Daily     nicotine  1 patch Transdermal Daily     nicotine   Transdermal Q8H     nicotine   Transdermal Daily     PHENobarbital  32.4 mg Oral TID     PNV PRENATAL PLUS MULTIVITAMIN  1 tablet Oral Daily     ranitidine  150 mg Oral BID     risperiDONE microspheres  50 mg Intramuscular Q14 Days              10 point ROS negative though did miss one chemo treatment.        Allergies:     Allergies  "  Allergen Reactions     Azithromycin Anaphylaxis     Abilify [Aripiprazole] Unknown     Wellbutrin [Bupropion] Other (See Comments)     suicidal     Zithromax [Azithromycin Dihydrate]             Psychiatric Examination:   /73  Pulse 100  Temp 98.3  F (36.8  C) (Tympanic)  Resp 14  Ht 1.651 m (5' 5\")  Wt 98.1 kg (216 lb 4.8 oz)  SpO2 95%  BMI 35.99 kg/m2  Weight is 216 lbs 4.8 oz  Body mass index is 35.99 kg/(m^2).    Appearance:  Awake, alert and cooeratove  Attitude:  coopertaive  Eye Contact: good  Mood:  better  Affect:  Less guarded  Speech:  clear, coherent and pressured speech  Psychomotor Behavior:  Slight abnormal jaw movements  Thought Process: more linear  Associations: No loosening of associations present  Thought Content:  no evidence of suicidal ideation or homicidal ideation and auditory hallucinations present  Insight:  limited  Judgment:  limited  Oriented to:  time, person, and place  Attention Span and Concentration:  fair  Recent and Remote Memory:  fair  Fund of Knowledge: appropriate  Muscle Strength and Tone: normal  Gait and Station: Normal             Labs:     Results for orders placed or performed in visit on 03/30/16   XR CHEST 2 VW    Narrative    XR CHEST 2 VW  3/30/2016 10:26 AM     HISTORY:  Gamma interferon antigen response without active  tuberculosis.      Impression    IMPRESSION: Negative chest.    ALEXA ROWE MD                Plan:   Does have scheduled appointment with oncology tomorrow   Continue current medications      "

## 2018-08-12 NOTE — PLAN OF CARE
"Problem: Patient Care Overview  Goal: Individualization & Mutuality  Patient will be compliant with treatment team recommendations.  Patient will report at least 6-8 hours of sleep each night.    Outcome: No Change  Patient's mood remains labile but less so compared to yesterday evening.  Patient compliant with medications, VS, and assessment.  Her affect is full range.  Patient is social with peers and staff.  She is attending groups.    1600:  /88  Pulse 104  Temp 98.5  F (36.9  C) (Tympanic)  Resp 16  Ht 1.651 m (5' 5\")  Wt 98.1 kg (216 lb 4.8 oz)  SpO2 97%  BMI 35.99 kg/m2.    1618: PRN clonidine 0.1 mg po administered for pulse >100 bpm.    1714:PRN Ativan 0.5 mg po administered for \"high\" anxiety r/t knowing her mother will be visiting her tonight.  \"We are opposites\".    Problem: Thought Process Alteration (Adult)  Goal: Identify Related Risk Factors and Signs and Symptoms  Patient will be compliant with medications and treatment team recommendations while on unit.    Outcome: No Change  Patient compliant with medications.    Goal: Improved Thought Process  Patient will hold reality based conversations prior to discharge.    Outcome: No Change  Patient able to hold reality based conversation.        "

## 2018-08-12 NOTE — PLAN OF CARE
Face to face end of shift report received from Deya VAZQUEZ RN. Rounding completed. Patient observed.     Zakia Javier  8/12/2018  12:00 AM

## 2018-08-12 NOTE — PLAN OF CARE
Face to face end of shift report received from Lacy Marcos. Rounding completed. Patient observed.     Emma Jaimes  8/12/2018  0800 AM

## 2018-08-12 NOTE — PLAN OF CARE
Face to face end of shift report received from Emma MCGOWAN RN. Rounding completed. Patient observed in [facility].    Deya Lawson  8/12/2018  3:27 PM

## 2018-08-12 NOTE — PROGRESS NOTES
08/12/18 1830   Patient Belongings   Did you bring any home meds/supplements to the hospital?  No  (NO MEDS)   Patient Belongings shoes  (belongings sent to belongings room)   Disposition of Belongings Locker  (belongings sent to belongings room)   Belongings Search Yes   Clothing Search No clothing   Second Staff Britni CODY   General Info Comment 1 bottle oscar you perfume, navy blue slip on shoes, leoprd slippers, copper colored falts, grey/blue slip on shoes, degree deodorant, bottle slip color, eye liner    List items sent to safe: NONE  All other belongings put in assigned cubby in belongings room.     I have reviewed my belongings list on admission and verify that it is correct.     Patient signature_______________________________    Second staff witness (if patient unable to sign) ______________________________       I have received all my belongings at discharge.    Patient signature________________________________    Kelsea   8/12/2018  6:36 PM

## 2018-08-12 NOTE — PLAN OF CARE
"Problem: Patient Care Overview  Goal: Individualization & Mutuality  Patient will be compliant with treatment team recommendations.  Patient will report at least 6-8 hours of sleep each night.    Outcome: Improving  Pt has brighter affect today  Denies depression or SI  Pt again took nap after breakfast as states \"chemo makes me tired\"  Takes meds without incident  Eating well        "

## 2018-08-12 NOTE — PLAN OF CARE
Problem: Patient Care Overview  Goal: Individualization & Mutuality  Patient will be compliant with treatment team recommendations.  Patient will report at least 6-8 hours of sleep each night.    Outcome: No Change  Pt has been in bed with eyes closed and regular respirations X 7 hours. 15 minute and PRN checks all night. No complaints offered. Will continue to monitor.      Zakia Javier  8/12/2018  5:03 AM

## 2018-08-13 ENCOUNTER — PATIENT OUTREACH (OUTPATIENT)
Dept: ONCOLOGY | Facility: OTHER | Age: 28
End: 2018-08-13

## 2018-08-13 ENCOUNTER — INFUSION THERAPY VISIT (OUTPATIENT)
Dept: INFUSION THERAPY | Facility: OTHER | Age: 28
DRG: 885 | End: 2018-08-13
Attending: INTERNAL MEDICINE
Payer: MEDICARE

## 2018-08-13 ENCOUNTER — ONCOLOGY VISIT (OUTPATIENT)
Dept: ONCOLOGY | Facility: OTHER | Age: 28
DRG: 885 | End: 2018-08-13
Attending: INTERNAL MEDICINE
Payer: MEDICARE

## 2018-08-13 VITALS
TEMPERATURE: 99 F | WEIGHT: 217.6 LBS | OXYGEN SATURATION: 95 % | RESPIRATION RATE: 16 BRPM | BODY MASS INDEX: 36.25 KG/M2 | SYSTOLIC BLOOD PRESSURE: 132 MMHG | HEIGHT: 65 IN | DIASTOLIC BLOOD PRESSURE: 84 MMHG | HEART RATE: 109 BPM

## 2018-08-13 VITALS
HEIGHT: 65 IN | SYSTOLIC BLOOD PRESSURE: 107 MMHG | DIASTOLIC BLOOD PRESSURE: 68 MMHG | TEMPERATURE: 98.2 F | BODY MASS INDEX: 36.25 KG/M2 | HEART RATE: 130 BPM | OXYGEN SATURATION: 96 % | WEIGHT: 217.6 LBS

## 2018-08-13 VITALS
BODY MASS INDEX: 36.25 KG/M2 | RESPIRATION RATE: 20 BRPM | OXYGEN SATURATION: 96 % | TEMPERATURE: 98.2 F | HEIGHT: 65 IN | HEART RATE: 115 BPM | WEIGHT: 217.6 LBS | SYSTOLIC BLOOD PRESSURE: 107 MMHG | DIASTOLIC BLOOD PRESSURE: 68 MMHG

## 2018-08-13 DIAGNOSIS — C50.412 MALIGNANT NEOPLASM OF UPPER-OUTER QUADRANT OF LEFT BREAST IN FEMALE, ESTROGEN RECEPTOR POSITIVE (H): Primary | ICD-10-CM

## 2018-08-13 DIAGNOSIS — Z17.0 MALIGNANT NEOPLASM OF UPPER-OUTER QUADRANT OF LEFT BREAST IN FEMALE, ESTROGEN RECEPTOR POSITIVE (H): Primary | ICD-10-CM

## 2018-08-13 LAB
ALBUMIN SERPL-MCNC: 3.7 G/DL (ref 3.4–5)
ALP SERPL-CCNC: 103 U/L (ref 40–150)
ALT SERPL W P-5'-P-CCNC: 50 U/L (ref 0–50)
ANION GAP SERPL CALCULATED.3IONS-SCNC: 7 MMOL/L (ref 3–14)
AST SERPL W P-5'-P-CCNC: 24 U/L (ref 0–45)
BASOPHILS # BLD AUTO: 0 10E9/L (ref 0–0.2)
BASOPHILS NFR BLD AUTO: 0.5 %
BILIRUB SERPL-MCNC: 0.2 MG/DL (ref 0.2–1.3)
BUN SERPL-MCNC: 16 MG/DL (ref 7–30)
CALCIUM SERPL-MCNC: 8.8 MG/DL (ref 8.5–10.1)
CHLORIDE SERPL-SCNC: 104 MMOL/L (ref 94–109)
CO2 SERPL-SCNC: 26 MMOL/L (ref 20–32)
CREAT SERPL-MCNC: 0.44 MG/DL (ref 0.52–1.04)
DIFFERENTIAL METHOD BLD: ABNORMAL
EOSINOPHIL # BLD AUTO: 0 10E9/L (ref 0–0.7)
EOSINOPHIL NFR BLD AUTO: 0 %
ERYTHROCYTE [DISTWIDTH] IN BLOOD BY AUTOMATED COUNT: 18 % (ref 10–15)
GFR SERPL CREATININE-BSD FRML MDRD: >90 ML/MIN/1.7M2
GLUCOSE SERPL-MCNC: 96 MG/DL (ref 70–99)
HCT VFR BLD AUTO: 35 % (ref 35–47)
HGB BLD-MCNC: 11.7 G/DL (ref 11.7–15.7)
IMM GRANULOCYTES # BLD: 0.1 10E9/L (ref 0–0.4)
IMM GRANULOCYTES NFR BLD: 0.7 %
LDH SERPL L TO P-CCNC: 166 U/L (ref 81–234)
LYMPHOCYTES # BLD AUTO: 1.3 10E9/L (ref 0.8–5.3)
LYMPHOCYTES NFR BLD AUTO: 14.4 %
MCH RBC QN AUTO: 27.5 PG (ref 26.5–33)
MCHC RBC AUTO-ENTMCNC: 33.4 G/DL (ref 31.5–36.5)
MCV RBC AUTO: 82 FL (ref 78–100)
MONOCYTES # BLD AUTO: 1 10E9/L (ref 0–1.3)
MONOCYTES NFR BLD AUTO: 11.2 %
NEUTROPHILS # BLD AUTO: 6.4 10E9/L (ref 1.6–8.3)
NEUTROPHILS NFR BLD AUTO: 73.2 %
NRBC # BLD AUTO: 0 10*3/UL
NRBC BLD AUTO-RTO: 0 /100
PLATELET # BLD AUTO: 283 10E9/L (ref 150–450)
POTASSIUM SERPL-SCNC: 3.9 MMOL/L (ref 3.4–5.3)
PROT SERPL-MCNC: 7.1 G/DL (ref 6.8–8.8)
RBC # BLD AUTO: 4.26 10E12/L (ref 3.8–5.2)
SODIUM SERPL-SCNC: 137 MMOL/L (ref 133–144)
WBC # BLD AUTO: 8.7 10E9/L (ref 4–11)

## 2018-08-13 PROCEDURE — 96375 TX/PRO/DX INJ NEW DRUG ADDON: CPT

## 2018-08-13 PROCEDURE — 83615 LACTATE (LD) (LDH) ENZYME: CPT | Mod: ZL | Performed by: INTERNAL MEDICINE

## 2018-08-13 PROCEDURE — A9270 NON-COVERED ITEM OR SERVICE: HCPCS | Mod: GY | Performed by: NURSE PRACTITIONER

## 2018-08-13 PROCEDURE — 85025 COMPLETE CBC W/AUTO DIFF WBC: CPT | Mod: ZL | Performed by: INTERNAL MEDICINE

## 2018-08-13 PROCEDURE — 96367 TX/PROPH/DG ADDL SEQ IV INF: CPT

## 2018-08-13 PROCEDURE — 99203 OFFICE O/P NEW LOW 30 MIN: CPT | Performed by: INTERNAL MEDICINE

## 2018-08-13 PROCEDURE — 96523 IRRIG DRUG DELIVERY DEVICE: CPT

## 2018-08-13 PROCEDURE — 12400011

## 2018-08-13 PROCEDURE — 25000132 ZZH RX MED GY IP 250 OP 250 PS 637: Mod: GY | Performed by: NURSE PRACTITIONER

## 2018-08-13 PROCEDURE — 25000128 H RX IP 250 OP 636: Performed by: INTERNAL MEDICINE

## 2018-08-13 PROCEDURE — G0463 HOSPITAL OUTPT CLINIC VISIT: HCPCS

## 2018-08-13 PROCEDURE — 96413 CHEMO IV INFUSION 1 HR: CPT

## 2018-08-13 PROCEDURE — G0463 HOSPITAL OUTPT CLINIC VISIT: HCPCS | Mod: 25

## 2018-08-13 PROCEDURE — 36415 COLL VENOUS BLD VENIPUNCTURE: CPT | Mod: ZL | Performed by: INTERNAL MEDICINE

## 2018-08-13 PROCEDURE — 25000125 ZZHC RX 250: Performed by: INTERNAL MEDICINE

## 2018-08-13 PROCEDURE — 80053 COMPREHEN METABOLIC PANEL: CPT | Mod: ZL | Performed by: INTERNAL MEDICINE

## 2018-08-13 PROCEDURE — 96374 THER/PROPH/DIAG INJ IV PUSH: CPT

## 2018-08-13 RX ORDER — EPINEPHRINE 0.3 MG/.3ML
0.3 INJECTION SUBCUTANEOUS EVERY 5 MIN PRN
Status: CANCELLED | OUTPATIENT
Start: 2018-08-13

## 2018-08-13 RX ORDER — MEPERIDINE HYDROCHLORIDE 25 MG/ML
25 INJECTION INTRAMUSCULAR; INTRAVENOUS; SUBCUTANEOUS EVERY 30 MIN PRN
Status: CANCELLED | OUTPATIENT
Start: 2018-08-13

## 2018-08-13 RX ORDER — ALBUTEROL SULFATE 0.83 MG/ML
2.5 SOLUTION RESPIRATORY (INHALATION)
Status: CANCELLED | OUTPATIENT
Start: 2018-08-13

## 2018-08-13 RX ORDER — MEPERIDINE HYDROCHLORIDE 25 MG/ML
25 INJECTION INTRAMUSCULAR; INTRAVENOUS; SUBCUTANEOUS EVERY 30 MIN PRN
Status: CANCELLED | OUTPATIENT
Start: 2018-08-20

## 2018-08-13 RX ORDER — SODIUM CHLORIDE 9 MG/ML
1000 INJECTION, SOLUTION INTRAVENOUS CONTINUOUS PRN
Status: CANCELLED
Start: 2018-08-13

## 2018-08-13 RX ORDER — DIPHENHYDRAMINE HYDROCHLORIDE 50 MG/ML
50 INJECTION INTRAMUSCULAR; INTRAVENOUS
Status: CANCELLED
Start: 2018-08-13

## 2018-08-13 RX ORDER — LORAZEPAM 2 MG/ML
0.5 INJECTION INTRAMUSCULAR EVERY 4 HOURS PRN
Status: CANCELLED
Start: 2018-08-13

## 2018-08-13 RX ORDER — METHYLPREDNISOLONE SODIUM SUCCINATE 125 MG/2ML
125 INJECTION, POWDER, LYOPHILIZED, FOR SOLUTION INTRAMUSCULAR; INTRAVENOUS
Status: CANCELLED
Start: 2018-08-13

## 2018-08-13 RX ORDER — ALBUTEROL SULFATE 90 UG/1
1-2 AEROSOL, METERED RESPIRATORY (INHALATION)
Status: CANCELLED
Start: 2018-08-13

## 2018-08-13 RX ORDER — SODIUM CHLORIDE 9 MG/ML
1000 INJECTION, SOLUTION INTRAVENOUS CONTINUOUS PRN
Status: CANCELLED
Start: 2018-08-20

## 2018-08-13 RX ORDER — ALBUTEROL SULFATE 90 UG/1
1-2 AEROSOL, METERED RESPIRATORY (INHALATION)
Status: CANCELLED
Start: 2018-08-20

## 2018-08-13 RX ORDER — EPINEPHRINE 0.3 MG/.3ML
0.3 INJECTION SUBCUTANEOUS EVERY 5 MIN PRN
Status: CANCELLED | OUTPATIENT
Start: 2018-08-20

## 2018-08-13 RX ORDER — ONDANSETRON 2 MG/ML
8 INJECTION INTRAMUSCULAR; INTRAVENOUS ONCE
Status: CANCELLED
Start: 2018-08-20 | End: 2018-08-20

## 2018-08-13 RX ORDER — HEPARIN SODIUM (PORCINE) LOCK FLUSH IV SOLN 100 UNIT/ML 100 UNIT/ML
5 SOLUTION INTRAVENOUS EVERY 8 HOURS
Status: DISCONTINUED | OUTPATIENT
Start: 2018-08-13 | End: 2018-08-13 | Stop reason: HOSPADM

## 2018-08-13 RX ORDER — METHYLPREDNISOLONE SODIUM SUCCINATE 125 MG/2ML
125 INJECTION, POWDER, LYOPHILIZED, FOR SOLUTION INTRAMUSCULAR; INTRAVENOUS
Status: CANCELLED
Start: 2018-08-20

## 2018-08-13 RX ORDER — ONDANSETRON 2 MG/ML
8 INJECTION INTRAMUSCULAR; INTRAVENOUS ONCE
Status: CANCELLED
Start: 2018-08-13 | End: 2018-08-13

## 2018-08-13 RX ORDER — HEPARIN SODIUM (PORCINE) LOCK FLUSH IV SOLN 100 UNIT/ML 100 UNIT/ML
5 SOLUTION INTRAVENOUS EVERY 8 HOURS
Status: CANCELLED
Start: 2018-08-13

## 2018-08-13 RX ORDER — ALBUTEROL SULFATE 0.83 MG/ML
2.5 SOLUTION RESPIRATORY (INHALATION)
Status: CANCELLED | OUTPATIENT
Start: 2018-08-20

## 2018-08-13 RX ORDER — DIPHENHYDRAMINE HYDROCHLORIDE 50 MG/ML
50 INJECTION INTRAMUSCULAR; INTRAVENOUS
Status: CANCELLED
Start: 2018-08-20

## 2018-08-13 RX ORDER — LORAZEPAM 2 MG/ML
0.5 INJECTION INTRAMUSCULAR EVERY 4 HOURS PRN
Status: CANCELLED
Start: 2018-08-20

## 2018-08-13 RX ORDER — EPINEPHRINE 1 MG/ML
0.3 INJECTION, SOLUTION, CONCENTRATE INTRAVENOUS EVERY 5 MIN PRN
Status: CANCELLED | OUTPATIENT
Start: 2018-08-13

## 2018-08-13 RX ORDER — EPINEPHRINE 1 MG/ML
0.3 INJECTION, SOLUTION, CONCENTRATE INTRAVENOUS EVERY 5 MIN PRN
Status: CANCELLED | OUTPATIENT
Start: 2018-08-20

## 2018-08-13 RX ADMIN — CELECOXIB 200 MG: 100 CAPSULE ORAL at 20:05

## 2018-08-13 RX ADMIN — FAMOTIDINE 20 MG: 20 INJECTION, SOLUTION INTRAVENOUS at 12:16

## 2018-08-13 RX ADMIN — LORATADINE 10 MG: 10 TABLET ORAL at 08:36

## 2018-08-13 RX ADMIN — CLONIDINE HYDROCHLORIDE 0.1 MG: 0.1 TABLET ORAL at 06:07

## 2018-08-13 RX ADMIN — PACLITAXEL 169 MG: 6 INJECTION, SOLUTION INTRAVENOUS at 12:46

## 2018-08-13 RX ADMIN — AMANTADINE 100 MG: 100 CAPSULE ORAL at 08:35

## 2018-08-13 RX ADMIN — VITAMIN A, VITAMIN C, VITAMIN D-3, VITAMIN E, VITAMIN B-1, VITAMIN B-2, NIACIN, VITAMIN B-6, CALCIUM, IRON, ZINC, COPPER 1 TABLET: 4000; 120; 400; 22; 1.84; 3; 20; 10; 1; 12; 200; 27; 25; 2 TABLET ORAL at 09:01

## 2018-08-13 RX ADMIN — GABAPENTIN 600 MG: 300 CAPSULE ORAL at 08:36

## 2018-08-13 RX ADMIN — CLONIDINE HYDROCHLORIDE 0.1 MG: 0.1 TABLET ORAL at 19:34

## 2018-08-13 RX ADMIN — GABAPENTIN 600 MG: 300 CAPSULE ORAL at 20:05

## 2018-08-13 RX ADMIN — NICOTINE 1 PATCH: 21 PATCH, EXTENDED RELEASE TRANSDERMAL at 08:36

## 2018-08-13 RX ADMIN — DIPHENHYDRAMINE HYDROCHLORIDE 50 MG: 50 INJECTION, SOLUTION INTRAMUSCULAR; INTRAVENOUS at 12:03

## 2018-08-13 RX ADMIN — RANITIDINE 150 MG: 150 TABLET ORAL at 08:36

## 2018-08-13 RX ADMIN — MAGNESIUM HYDROXIDE 30 ML: 400 SUSPENSION ORAL at 23:44

## 2018-08-13 RX ADMIN — RISPERIDONE 1 MG: 1 TABLET, ORALLY DISINTEGRATING ORAL at 23:45

## 2018-08-13 RX ADMIN — HYDROXYZINE PAMOATE 100 MG: 50 CAPSULE ORAL at 08:36

## 2018-08-13 RX ADMIN — HYDROCORTISONE SODIUM SUCCINATE 100 MG: 100 INJECTION, POWDER, FOR SOLUTION INTRAMUSCULAR; INTRAVENOUS at 11:39

## 2018-08-13 RX ADMIN — LORAZEPAM 0.5 MG: 0.5 TABLET ORAL at 09:01

## 2018-08-13 RX ADMIN — ONDANSETRON 8 MG: 2 INJECTION INTRAMUSCULAR; INTRAVENOUS at 11:40

## 2018-08-13 RX ADMIN — GABAPENTIN 600 MG: 300 CAPSULE ORAL at 15:05

## 2018-08-13 RX ADMIN — PHENOBARBITAL 32.4 MG: 32.4 TABLET ORAL at 08:36

## 2018-08-13 RX ADMIN — Medication 1 G: at 20:05

## 2018-08-13 RX ADMIN — RANITIDINE 150 MG: 150 TABLET ORAL at 20:05

## 2018-08-13 RX ADMIN — GABAPENTIN 600 MG: 300 CAPSULE ORAL at 17:21

## 2018-08-13 RX ADMIN — LAMOTRIGINE 25 MG: 25 TABLET ORAL at 08:36

## 2018-08-13 RX ADMIN — HEPARIN 5 ML: 100 SYRINGE at 13:58

## 2018-08-13 RX ADMIN — Medication 1 G: at 08:36

## 2018-08-13 RX ADMIN — NICOTINE POLACRILEX 4 MG: 2 GUM, CHEWING ORAL at 20:05

## 2018-08-13 RX ADMIN — AMANTADINE 100 MG: 100 CAPSULE ORAL at 20:05

## 2018-08-13 RX ADMIN — SODIUM CHLORIDE 250 ML: 9 INJECTION, SOLUTION INTRAVENOUS at 11:39

## 2018-08-13 RX ADMIN — CELECOXIB 200 MG: 100 CAPSULE ORAL at 08:36

## 2018-08-13 ASSESSMENT — ACTIVITIES OF DAILY LIVING (ADL)
ORAL_HYGIENE: INDEPENDENT
GROOMING: INDEPENDENT
DRESS: INDEPENDENT;SCRUBS (BEHAVIORAL HEALTH)
GROOMING: INDEPENDENT
LAUNDRY: UNABLE TO COMPLETE
DRESS: SCRUBS (BEHAVIORAL HEALTH);INDEPENDENT
ORAL_HYGIENE: INDEPENDENT
LAUNDRY: UNABLE TO COMPLETE

## 2018-08-13 ASSESSMENT — PAIN SCALES - GENERAL: PAINLEVEL: NO PAIN (0)

## 2018-08-13 NOTE — NURSING NOTE
"Chief Complaint   Patient presents with     Consult     malignant neoplasm of upper outer quad left breast       Initial /68 (BP Location: Right arm, Cuff Size: Adult Large)  Pulse 130  Temp 98.2  F (36.8  C) (Tympanic)  Ht 1.651 m (5' 5\")  Wt 98.7 kg (217 lb 9.6 oz)  SpO2 96%  BMI 36.21 kg/m2 Estimated body mass index is 36.21 kg/(m^2) as calculated from the following:    Height as of this encounter: 1.651 m (5' 5\").    Weight as of this encounter: 98.7 kg (217 lb 9.6 oz).  Medication Reconciliation: complete    Britni Chaidez LPN  "

## 2018-08-13 NOTE — MR AVS SNAPSHOT
"              After Visit Summary   8/13/2018    Marti Javier    MRN: [id number]           Patient Information     Date Of Birth          1990        Visit Information        Provider Department      8/13/2018 9:30 AM HC INF RM 3316 [facility]        Today's Diagnoses     Malignant neoplasm of upper-outer quadrant of left breast in female, estrogen receptor positive (H)    -  1      Care Instructions    Chemo administration if labs meet parameters and following appointment with Dr. García.           Follow-ups after your visit        Your next 10 appointments already scheduled     Aug 20, 2018 12:00 PM CDT   Level 4 with HC INF RM 3302   [facility]    [address]  [facility] [address]   [phone]              Who to contact     If you have questions or need follow up information about today's clinic visit or your schedule please contact [facility] directly at [phone].  Normal or non-critical lab and imaging results will be communicated to you by MyChart, letter or phone within 4 business days after the clinic has received the results. If you do not hear from us within 7 days, please contact the clinic through Pricebetshart or phone. If you have a critical or abnormal lab result, we will notify you by phone as soon as possible.  Submit refill requests through Epiclist or call your pharmacy and they will forward the refill request to us. Please allow 3 business days for your refill to be completed.          Additional Information About Your Visit        PricebetsharActiveGift Information     Epiclist lets you send messages to your doctor, view your test results, renew your prescriptions, schedule appointments and more. To sign up, go to [email or web address] . Click on \"Log in\" on the left side of the screen, which will take you to the Welcome page. Then click on \"Sign up Now\" on the right side of the page.     You will be asked to enter " "the access code listed below, as well as some personal information. Please follow the directions to create your username and password.     Your access code is: [email or web address]  Expires: 2018 10:44 AM     Your access code will  in 90 days. If you need help or a new code, please call your [place] clinic or [phone].        Care EveryWhere ID     This is your Care EveryWhere ID. This could be used by other organizations to access your [place] medical records  [id number]        Your Vitals Were     Pulse Temperature Respirations Height Pulse Oximetry BMI (Body Mass Index)    115 98.2  F (36.8  C) (Tympanic) 20 1.651 m (5' 5\") 96% 36.21 kg/m2       Blood Pressure from Last 3 Encounters:   18 132/84   18 107/68   18 107/68    Weight from Last 3 Encounters:   18 98.7 kg (217 lb 9.6 oz)   18 98.7 kg (217 lb 9.6 oz)   16 84.8 kg (187 lb)              We Performed the Following     CBC with platelets diff     Comprehensive metabolic panel     Lactate Dehydrogenase          Today's Medication Changes      Notice     This visit is during an admission. Changes to the med list made in this visit will be reflected in the After Visit Summary of the admission.             Primary Care Provider Office Phone # Fax #    Kranthi Perez [phone]       [facility] [address] [address]        Equal Access to Services     SHARI JOHNSON : Hadii marilyn ku hadasho Soomaali, waaxda luqadaha, qaybta kaalmada denodreegyada, phill camargo . So [facility] [phone].    ATENCIÓN: Si habla español, tiene a downey disposición servicios gratuitos de asistencia lingüística. Llame al [phone].    We comply with applicable federal civil rights laws and [place] laws. We do not discriminate on the basis of race, color, national origin, age, disability, sex, sexual orientation, or gender identity.            Thank you!     Thank " you for choosing [facility]  for your care. Our goal is always to provide you with excellent care. Hearing back from our patients is one way we can continue to improve our services. Please take a few minutes to complete the written survey that you may receive in the mail after your visit with us. Thank you!             Your Updated Medication List - Protect others around you: Learn how to safely use, store and throw away your medicines at www.disposemymeds.org.      Notice     This visit is during an admission. Changes to the med list made in this visit will be reflected in the After Visit Summary of the admission.

## 2018-08-13 NOTE — PLAN OF CARE
Problem: Patient Care Overview  Goal: Team Discussion  Team Plan:   BEHAVIORAL TEAM DISCUSSION    Participants:  Kusum Cota NP, Michael Aranda NP, Toya Wolf NP, Renetta Quinteros LSW,  Geno Hargrove LSW, Deya Lawson RN, Janice Garcia [employer], Marilyn Irvin OT, Haily Fuchs OT  Progress: good, tracking better  Continued Stay Criteria/Rationale: chemotherapy, monitoring for medication side effects and effectiveness  Medical/Physical: chemotherapy  Precautions:   Behavioral Orders   Procedures     Code 1 - Restrict to Unit     Routine Programming     As clinically indicated     Sexual precautions     Status 15     Every 15 minutes.     Plan: DC to [facility] when bed when chemotherapy is done  Rationale for change in precautions or plan: none

## 2018-08-13 NOTE — PROGRESS NOTES
Visit Date:   08/13/2018      HEMATOLOGY-ONCOLOGY CONSULTATION       REASON FOR CONSULTATION:  Clinical stage II multifocal left breast invasive ductal carcinoma, ER positive, MO positive, HER-2 negative.      REFERRING PROVIDERS:  Parth Villalba MD from [facility] in [place] as well as [place], [place].  Also, Kusum Cota, nurse practitioner.      HISTORY OF PRESENT ILLNESS:  Ms. Javier is a 28-year-old white female with a history of attention deficit hyperactivity disorder, schizoaffective disorder who we are asked to evaluate concerning diagnosis of clinical stage II multifocal invasive ductal carcinoma of the left breast.  According to the patient, she had palpated a lump in her left breast which led to a diagnostic mammogram on 11/29/2017 that revealed there to be  adjacent nodules that were suspicious.  Ultrasound on 11/29/2017 revealed a 1.8 cm nodule in the 3 o'clock position, 8 mm nodule in the 3:30 position and a 1.5 cm nodule at the 4 o'clock position in the left breast.  Ultrasound-guided biopsy was performed on 12/06/2017 that revealed 3 abnormal axillary nodes which were biopsied.  Pathology revealed invasive ductal carcinoma, grade 3 in the 2:30 specimen and 3:30 specimen.  The 4 o'clock specimen was DCIS.  The lymph node also revealed metastatic carcinoma.  ER was positive at 91%, MO was positive at 81%, HER-2/ángel was negative by FISH.  PET scan was completed and revealed no evidence of distant metastatic disease.        The patient was seen by Dr. Parth Villalba at [place] and was started on dose-dense Adriamycin, Cytoxan beginning on 01/04/2018 as part of a neoadjuvant protocol.  The patient completed 3 cycles of AC in [place] and then a fourth one was given in [place], [place] where she was committed to the psych ceja.  It was decided to go ahead to surgery.  The patient wanted immediate reconstruction but due to her smoking, this precluded this  procedure and therefore, the patient underwent bilateral mastectomies on 04/12/2018, which was performed in [place], [place] under direction of the [facility] ER.  Pathology revealed that the right simple mastectomy revealed proliferative fibrocystic changes and microcalcifications and the left breast simple mastectomy revealed invasive ductal carcinoma 6 mm.  Three foci of invasive carcinoma were identified across the tumor bed.  There was high-nuclear grade DCIS present.  Margins were uninvolved by invasive carcinoma.  The left axillary node dissection revealed 7 lymph nodes that were examined, 1 revealed isolated tumor cells, no lymphovascular invasion.  It was decided to proceed with weekly Taxol under direction of Dr. Parth Villalba as the patient had residual disease.  The patient had received 9 cycles of weekly paclitaxel at a dose of 80 mg/m2 and most recently was readmitted to [facility] here in [place], [place], as there were no beds available in [place].  The patient is currently committed.  She would like to return to [place].  We did discuss the case with Dr. Villalba and the plan is to proceed with Taxol today 80 mg/m2, this would be her 10th weekly dose, and then complete 12 weekly doses while she is here total.        Risk factors for breast cancer include there is no family history of breast cancer, her age of menarche was 8, she had 1 child at age 22, she is a chronic smoker, she is currently not menstruating.  It was discussed that the patient may need to have egg preservation if she plans to have children in the future.  The patient has declined egg preservation at this time.  The patient otherwise is currently complaining of a cyst at her right mastectomy site that has been stable.  Otherwise, she denies any shortness of breath, chest pain, abdominal pain, neuropathic symptoms, fevers, night sweats, weight loss.      PAST MEDICAL HISTORY:  Attention deficit hyperactivity  disorder; MRSA colonization; cellulitis; tobacco use; PTSD; hypertriglyceridemia; obesity; personality disorder; schizoaffective disorder, bipolar type; breast cancer as above; depression; environmental allergies; gastroesophageal reflux disease; hypertriglyceridemia; obsessive-compulsive disorder; seizure disorder.      ALLERGIES:  SHE IS ALLERGIC TO AZITHROMYCIN, ABILIFY, WELLBUTRIN AND ZITHROMAX.      MEDICATIONS:  She is currently on include Pepcid 20 mg daily, Lamictal 25 mg daily, gabapentin 600 mg q.i.d., nicotine patch daily, Symmetrel 100 mg b.i.d., Claritin 10 mg daily, Nicoderm CQ 21 mg patch daily, Celebrex 200 mg b.i.d., Atarax 25 mg q.4 hours p.r.n., clonazepam 1 mg b.i.d., fish oil Omega 3 fatty acid 1000 mg daily.      SOCIAL HISTORY:  She is a chronic smoker, at least a pack per day.  She quit recently.  Alcohol is negative.  She is currently committed to the psych ceja.      FAMILY HISTORY:  Significant for grandmother with unknown type of cancer.      REVIEW OF SYSTEMS:   CENTRAL NERVOUS SYSTEM:  Negative for headaches.   RESPIRATORY:  Negative for shortness of breath, cough, hemoptysis.   CARDIAC:  Negative for chest pain and palpitations.   GASTROINTESTINAL:  Negative for constipation.   MUSCULOSKELETAL:  Unremarkable.   GENITOURINARY:  Negative for nocturia, urgency, frequency.   CONSTITUTIONAL:  Negative for fevers, night sweats, weight loss.      PHYSICAL EXAMINATION:   GENERAL:  She is an obese, middle-aged white female in no acute distress.   VITAL SIGNS:  Blood pressure is 107/68, pulse 130, temp 98.2.   HEENT:  Atraumatic, normocephalic.  Oropharynx nonerythematous.   NECK:  Supple.   LUNGS:  Clear to auscultation and percussion.   HEART:  Regular rhythm; S1, S2 normal.   BREASTS:  Exam reveals that she has well-healed bilateral mastectomy scars with seromatous fluid collection at the right mastectomy site.   ABDOMEN:  Soft, nontender.   LYMPHATICS:  No cervical or supraclavicular nodes.    EXTREMITIES:  Without edema.   NEUROLOGIC:  Nonfocal.      LABORATORY DATA:  Reveal CBC:  White count 8.7, H and H 11.7 and 35.0, platelet count 283.  BUN is 16, creatinine 0.44.  LFTs are normal.  LDH is 166.      IMPRESSION:     1.  Clinical stage II multifocal invasive ductal carcinoma of the left breast, ER positive, AL positive, HER-2 negative, status post neoadjuvant dose-dense AC x 4 followed by bilateral mastectomy with partial response to therapy as per Dr. Villalba.  The patient has received 9 weekly cycles of Taxol at 80 mL/m2.  She is here now for her 10th cycle and potentially 11th and 12th cycle.  Once her commitment is completed, she will return to [place] for further treatment under the direction of Dr. Parth Villalba.   2.  Right chest wall seroma, stable.      PLAN:  Continue to monitor.  Otherwise, we will see the patient with her 12th weekly dose of Taxol and obtain CBC, CMP, LDH.      Seventy minutes was spent with the patient, greater than half the time spent in counseling and coordination of care.         MICHAEL PALM MD             D: 2018   T: 2018   MT: CLEMENTINA      Name:     MIGUEL A ALBERTO   MRN:      [id number]        Account:      [id number]   :      1990           Visit Date:   2018      Document: [id number]       cc: Parth Cota NP

## 2018-08-13 NOTE — PROGRESS NOTES
Patients right sided power port accessed using non-coring, 19 gauge, 3/4 inch needle.Mask donned by caregiver: yes  Site prepped with CHG: yes Labs drawn: yes  Dressing applied using aseptic technique: yes. Port accessed per facility protocol. Port flushed easily, blood return noted.  No signs and symptoms of infection or infiltration.  Port flushed 2 mL's normal saline, blood return noted, flushed with 8  mLs Normal Saline, 10 mLs blood discarded.  2 tubes taken for ordered labs, port flushed with 20 mLs normal saline.  Port left accessed as patient has appointment with Dr. García, and is then due for chemo if parameters are met.  Patient discharged with no complaints.

## 2018-08-13 NOTE — PATIENT INSTRUCTIONS
Paclitaxel Solution for injection  What is this medicine?  PACLITAXEL (CIRO li TAX el) is a chemotherapy drug. It targets fast dividing cells, like cancer cells, and causes these cells to die. This medicine is used to treat ovarian cancer, breast cancer, and other cancers.  This medicine may be used for other purposes; ask your health care provider or pharmacist if you have questions.  What should I tell my health care provider before I take this medicine?  They need to know if you have any of these conditions:    blood disorders    irregular heartbeat    infection (especially a virus infection such as chickenpox, cold sores, or herpes)    liver disease    previous or ongoing radiation therapy    an unusual or allergic reaction to paclitaxel, alcohol, polyoxyethylated castor oil, other chemotherapy agents, other medicines, foods, dyes, or preservatives    pregnant or trying to get pregnant    breast-feeding  How should I use this medicine?  This drug is given as an infusion into a vein. It is administered in a hospital or clinic by a specially trained health care professional.  Talk to your pediatrician regarding the use of this medicine in children. Special care may be needed.  Overdosage: If you think you have taken too much of this medicine contact a poison control center or emergency room at once.  NOTE: This medicine is only for you. Do not share this medicine with others.  What if I miss a dose?  It is important not to miss your dose. Call your doctor or health care professional if you are unable to keep an appointment.  What may interact with this medicine?  Do not take this medicine with any of the following medications:    disulfiram    metronidazole  This medicine may also interact with the following medications:    cyclosporine    diazepam    ketoconazole    medicines to increase blood counts like filgrastim, pegfilgrastim, sargramostim    other chemotherapy drugs like cisplatin, doxorubicin, epirubicin,  etoposide, teniposide, vincristine    quinidine    testosterone    vaccines    verapamil  Talk to your doctor or health care professional before taking any of these medicines:    acetaminophen    aspirin    ibuprofen    ketoprofen    naproxen  This list may not describe all possible interactions. Give your health care provider a list of all the medicines, herbs, non-prescription drugs, or dietary supplements you use. Also tell them if you smoke, drink alcohol, or use illegal drugs. Some items may interact with your medicine.  What should I watch for while using this medicine?  Your condition will be monitored carefully while you are receiving this medicine. You will need important blood work done while you are taking this medicine.  This drug may make you feel generally unwell. This is not uncommon, as chemotherapy can affect healthy cells as well as cancer cells. Report any side effects. Continue your course of treatment even though you feel ill unless your doctor tells you to stop.  In some cases, you may be given additional medicines to help with side effects. Follow all directions for their use.  Call your doctor or health care professional for advice if you get a fever, chills or sore throat, or other symptoms of a cold or flu. Do not treat yourself. This drug decreases your body's ability to fight infections. Try to avoid being around people who are sick.  This medicine may increase your risk to bruise or bleed. Call your doctor or health care professional if you notice any unusual bleeding.  Be careful brushing and flossing your teeth or using a toothpick because you may get an infection or bleed more easily. If you have any dental work done, tell your dentist you are receiving this medicine.  Avoid taking products that contain aspirin, acetaminophen, ibuprofen, naproxen, or ketoprofen unless instructed by your doctor. These medicines may hide a fever.  Do not become pregnant while taking this medicine. Women  should inform their doctor if they wish to become pregnant or think they might be pregnant. There is a potential for serious side effects to an unborn child. Talk to your health care professional or pharmacist for more information. Do not breast-feed an infant while taking this medicine.  Men are advised not to father a child while receiving this medicine.  What side effects may I notice from receiving this medicine?  Side effects that you should report to your doctor or health care professional as soon as possible:    allergic reactions like skin rash, itching or hives, swelling of the face, lips, or tongue    low blood counts - This drug may decrease the number of white blood cells, red blood cells and platelets. You may be at increased risk for infections and bleeding.    signs of infection - fever or chills, cough, sore throat, pain or difficulty passing urine    signs of decreased platelets or bleeding - bruising, pinpoint red spots on the skin, black, tarry stools, nosebleeds    signs of decreased red blood cells - unusually weak or tired, fainting spells, lightheadedness    breathing problems    chest pain    high or low blood pressure    mouth sores    nausea and vomiting    pain, swelling, redness or irritation at the injection site    pain, tingling, numbness in the hands or feet    slow or irregular heartbeat    swelling of the ankle, feet, hands  Side effects that usually do not require medical attention (report to your doctor or health care professional if they continue or are bothersome):    bone pain    complete hair loss including hair on your head, underarms, pubic hair, eyebrows, and eyelashes    changes in the color of fingernails    diarrhea    loosening of the fingernails    loss of appetite    muscle or joint pain    red flush to skin    sweating  This list may not describe all possible side effects. Call your doctor for medical advice about side effects. You may report side effects to FDA at  1-800-FDA-1088.  Where should I keep my medicine?  This drug is given in a hospital or clinic and will not be stored at home.  NOTE:This sheet is a summary. It may not cover all possible information. If you have questions about this medicine, talk to your doctor, pharmacist, or health care provider. Copyright  2016 Gold Standard

## 2018-08-13 NOTE — PATIENT INSTRUCTIONS
We would like to see you back next week for your weekly chemotherapy.     When you are in need of a refill of your medications, please call your pharmacy and they will send us the request. If you have any questions please call [phone]

## 2018-08-13 NOTE — PLAN OF CARE
Face to face end of shift report received from Deya VAZQUEZ RN. Rounding completed. Patient observed.     Zakia Javier  8/13/2018  12:00 AM

## 2018-08-13 NOTE — PLAN OF CARE
Face to face end of shift report received from Zakia MCGOWAN RN. Rounding completed. Patient observed.     Deya Lawson  8/13/2018  7:44 AM

## 2018-08-13 NOTE — PLAN OF CARE
"Problem: Patient Care Overview  Goal: Individualization & Mutuality  Patient will be compliant with treatment team recommendations.  Patient will report at least 6-8 hours of sleep each night.    Outcome: No Change  Pt has been in bed with eyes closed and regular respirations for total of 7 hours. 15 minute and PRN checks all night.Pt. In bed  Will continue to monitor.    Pt. Up @ 0130 mumbling to staff about her books. Pt. Reports \"Where is the phone I need to call my mom for a ride. Pt. Oriented to time, place and morning appointment with oncology. Pt. Reports \"I dream sometimes.\" Pt. Closes eyes and no further c/o any kind.     Security updated about Pt. Escort needs.     0607 Pt. Administered PRN Clonidine 0.1 mg For pulse of 114.     Zakia Javier  8/13/2018  2:50 AM        "

## 2018-08-13 NOTE — MR AVS SNAPSHOT
After Visit Summary   8/13/2018    Marti Javier    MRN: [id number]           Patient Information     Date Of Birth          1990        Visit Information        Provider Department      8/13/2018 11:00 AM  INF  [address] [facility] [place]        Today's Diagnoses     Malignant neoplasm of upper-outer quadrant of left breast in female, estrogen receptor positive (H)    -  1      Care Instructions      Paclitaxel Solution for injection  What is this medicine?  PACLITAXEL (CIRO li TAX el) is a chemotherapy drug. It targets fast dividing cells, like cancer cells, and causes these cells to die. This medicine is used to treat ovarian cancer, breast cancer, and other cancers.  This medicine may be used for other purposes; ask your health care provider or pharmacist if you have questions.  What should I tell my health care provider before I take this medicine?  They need to know if you have any of these conditions:    blood disorders    irregular heartbeat    infection (especially a virus infection such as chickenpox, cold sores, or herpes)    liver disease    previous or ongoing radiation therapy    an unusual or allergic reaction to paclitaxel, alcohol, polyoxyethylated castor oil, other chemotherapy agents, other medicines, foods, dyes, or preservatives    pregnant or trying to get pregnant    breast-feeding  How should I use this medicine?  This drug is given as an infusion into a vein. It is administered in a hospital or clinic by a specially trained health care professional.  Talk to your pediatrician regarding the use of this medicine in children. Special care may be needed.  Overdosage: If you think you have taken too much of this medicine contact a poison control center or emergency room at once.  NOTE: This medicine is only for you. Do not share this medicine with others.  What if I miss a dose?  It is important not to miss your dose. Call your doctor or health care professional if you  are unable to keep an appointment.  What may interact with this medicine?  Do not take this medicine with any of the following medications:    disulfiram    metronidazole  This medicine may also interact with the following medications:    cyclosporine    diazepam    ketoconazole    medicines to increase blood counts like filgrastim, pegfilgrastim, sargramostim    other chemotherapy drugs like cisplatin, doxorubicin, epirubicin, etoposide, teniposide, vincristine    quinidine    testosterone    vaccines    verapamil  Talk to your doctor or health care professional before taking any of these medicines:    acetaminophen    aspirin    ibuprofen    ketoprofen    naproxen  This list may not describe all possible interactions. Give your health care provider a list of all the medicines, herbs, non-prescription drugs, or dietary supplements you use. Also tell them if you smoke, drink alcohol, or use illegal drugs. Some items may interact with your medicine.  What should I watch for while using this medicine?  Your condition will be monitored carefully while you are receiving this medicine. You will need important blood work done while you are taking this medicine.  This drug may make you feel generally unwell. This is not uncommon, as chemotherapy can affect healthy cells as well as cancer cells. Report any side effects. Continue your course of treatment even though you feel ill unless your doctor tells you to stop.  In some cases, you may be given additional medicines to help with side effects. Follow all directions for their use.  Call your doctor or health care professional for advice if you get a fever, chills or sore throat, or other symptoms of a cold or flu. Do not treat yourself. This drug decreases your body's ability to fight infections. Try to avoid being around people who are sick.  This medicine may increase your risk to bruise or bleed. Call your doctor or health care professional if you notice any unusual  bleeding.  Be careful brushing and flossing your teeth or using a toothpick because you may get an infection or bleed more easily. If you have any dental work done, tell your dentist you are receiving this medicine.  Avoid taking products that contain aspirin, acetaminophen, ibuprofen, naproxen, or ketoprofen unless instructed by your doctor. These medicines may hide a fever.  Do not become pregnant while taking this medicine. Women should inform their doctor if they wish to become pregnant or think they might be pregnant. There is a potential for serious side effects to an unborn child. Talk to your health care professional or pharmacist for more information. Do not breast-feed an infant while taking this medicine.  Men are advised not to father a child while receiving this medicine.  What side effects may I notice from receiving this medicine?  Side effects that you should report to your doctor or health care professional as soon as possible:    allergic reactions like skin rash, itching or hives, swelling of the face, lips, or tongue    low blood counts - This drug may decrease the number of white blood cells, red blood cells and platelets. You may be at increased risk for infections and bleeding.    signs of infection - fever or chills, cough, sore throat, pain or difficulty passing urine    signs of decreased platelets or bleeding - bruising, pinpoint red spots on the skin, black, tarry stools, nosebleeds    signs of decreased red blood cells - unusually weak or tired, fainting spells, lightheadedness    breathing problems    chest pain    high or low blood pressure    mouth sores    nausea and vomiting    pain, swelling, redness or irritation at the injection site    pain, tingling, numbness in the hands or feet    slow or irregular heartbeat    swelling of the ankle, feet, hands  Side effects that usually do not require medical attention (report to your doctor or health care professional if they continue or  are bothersome):    bone pain    complete hair loss including hair on your head, underarms, pubic hair, eyebrows, and eyelashes    changes in the color of fingernails    diarrhea    loosening of the fingernails    loss of appetite    muscle or joint pain    red flush to skin    sweating  This list may not describe all possible side effects. Call your doctor for medical advice about side effects. You may report side effects to FDA at 0-360-SJK-7790.  Where should I keep my medicine?  This drug is given in a hospital or clinic and will not be stored at home.  NOTE:This sheet is a summary. It may not cover all possible information. If you have questions about this medicine, talk to your doctor, pharmacist, or health care provider. Copyright  2016 Gold Standard                Follow-ups after your visit        Your next 10 appointments already scheduled     Aug 20, 2018 12:00 PM CDT   Level 4 with  INF RM 3302   [facility] ([facility] [place] [facility] )    [address] [address]   [phone]              Who to contact     If you have questions or need follow up information about today's clinic visit or your schedule please contact [facility] directly at [phone].  Normal or non-critical lab and imaging results will be communicated to you by MyChart, letter or phone within 4 business days after the clinic has received the results. If you do not hear from us within 7 days, please contact the clinic through MyChart or phone. If you have a critical or abnormal lab result, we will notify you by phone as soon as possible.  Submit refill requests through [employer] or call your pharmacy and they will forward the refill request to us. Please allow 3 business days for your refill to be completed.          Additional Information About Your Visit        [employer] Information     [employer] lets you send messages to your doctor, view your test results, renew your prescriptions,  "schedule appointments and more. To sign up, go to [email or web address] . Click on \"Log in\" on the left side of the screen, which will take you to the Welcome page. Then click on \"Sign up Now\" on the right side of the page.     You will be asked to enter the access code listed below, as well as some personal information. Please follow the directions to create your username and password.     Your access code is: [email or web address]  Expires: 2018 10:44 AM     Your access code will  in 90 days. If you need help or a new code, please call your [place] clinic or [phone].        Care EveryWhere ID     This is your Care EveryWhere ID. This could be used by other organizations to access your [place] medical records  [id number]        Your Vitals Were     Pulse Temperature Respirations Height Pulse Oximetry BMI (Body Mass Index)    109 99  F (37.2  C) 16 1.651 m (5' 5\") 95% 36.21 kg/m2       Blood Pressure from Last 3 Encounters:   18 132/84   18 107/68   18 107/68    Weight from Last 3 Encounters:   18 98.7 kg (217 lb 9.6 oz)   18 98.7 kg (217 lb 9.6 oz)   18 98.7 kg (217 lb 9.6 oz)              Today, you had the following     No orders found for display         Today's Medication Changes      Notice     This visit is during an admission. Changes to the med list made in this visit will be reflected in the After Visit Summary of the admission.             Primary Care Provider Office Phone # Fax #    Kranthi Perez [phone]       [facility] [address] [address]        Equal Access to Services     SHARI JOHNSON : Milan Rodríguez, jah kay, phill hooks. So [facility] [place] [phone].    ATENCIÓN: Si habla español, tiene a downey disposición servicios gratuitos de asistencia lingüística. Jazzy al [phone].    We comply with applicable federal " civil rights laws and [place] laws. We do not discriminate on the basis of race, color, national origin, age, disability, sex, sexual orientation, or gender identity.            Thank you!     Thank you for choosing [facility]  for your care. Our goal is always to provide you with excellent care. Hearing back from our patients is one way we can continue to improve our services. Please take a few minutes to complete the written survey that you may receive in the mail after your visit with us. Thank you!             Your Updated Medication List - Protect others around you: Learn how to safely use, store and throw away your medicines at www.disposemymeds.org.      Notice     This visit is during an admission. Changes to the med list made in this visit will be reflected in the After Visit Summary of the admission.

## 2018-08-13 NOTE — MR AVS SNAPSHOT
After Visit Summary   8/13/2018    Marti Javier    MRN: [id number]           Patient Information     Date Of Birth          1990        Visit Information        Provider Department      8/13/2018 10:00 AM Hernán García MD [facility]        Today's Diagnoses     Malignant neoplasm of upper-outer quadrant of left breast in female, estrogen receptor positive (H)    -  1      Care Instructions    We would like to see you back next week for your weekly chemotherapy.     When you are in need of a refill of your medications, please call your pharmacy and they will send us the request. If you have any questions please call [phone]            Follow-ups after your visit        Your next 10 appointments already scheduled     Aug 13, 2018 11:00 AM CDT   Level 4 with HC INF RM 3316   [facility] ([facility] [place] )    [address]  [place] [address] [phone]            Aug 20, 2018 12:00 PM CDT   Level 4 with HC INF RM 3302   [facility] ([facility] [place] )    [address]  [place] [address]   [phone]              Who to contact     If you have questions or need follow up information about today's clinic visit or your schedule please contact [facility] directly at [phone].  Normal or non-critical lab and imaging results will be communicated to you by MyChart, letter or phone within 4 business days after the clinic has received the results. If you do not hear from us within 7 days, please contact the clinic through MyChart or phone. If you have a critical or abnormal lab result, we will notify you by phone as soon as possible.  Submit refill requests through [employer] or call your pharmacy and they will forward the refill request to us. Please allow 3 business days for your refill to be completed.          Additional Information About Your Visit        MyChart Information     [facility] lets  "you send messages to your doctor, view your test results, renew your prescriptions, schedule appointments and more. To sign up, go to [email or web address] . Click on \"Log in\" on the left side of the screen, which will take you to the Welcome page. Then click on \"Sign up Now\" on the right side of the page.     You will be asked to enter the access code listed below, as well as some personal information. Please follow the directions to create your username and password.     Your access code is: [email or web address]  Expires: 2018 10:44 AM     Your access code will  in 90 days. If you need help or a new code, please call your [place] clinic or [phone].        Care EveryWhere ID     This is your Care EveryWhere ID. This could be used by other organizations to access your [place] medical records  [id number]        Your Vitals Were     Pulse Temperature Height Pulse Oximetry BMI (Body Mass Index)       130 98.2  F (36.8  C) (Tympanic) 1.651 m (5' 5\") 96% 36.21 kg/m2        Blood Pressure from Last 3 Encounters:   18 107/68   16 105/73   16 111/76    Weight from Last 3 Encounters:   18 98.7 kg (217 lb 9.6 oz)   16 84.8 kg (187 lb)   16 86.7 kg (191 lb 3.2 oz)              Today, you had the following     No orders found for display         Today's Medication Changes      Notice     This visit is during an admission. Changes to the med list made in this visit will be reflected in the After Visit Summary of the admission.             Primary Care Provider Office Phone # Fax #    Kranthi Perez [phone]       [facility] [address] [address]        Equal Access to Services     SHARI JOHNSON : jah Stewart, phill hooks. [facility] [phone].    ATENCIÓN: Si habla richmond, tiene a downey disposición servicios gratuitos de " asistencia lingüística. Jazzy al [phone].    We comply with applicable federal civil rights laws and [place] laws. We do not discriminate on the basis of race, color, national origin, age, disability, sex, sexual orientation, or gender identity.            Thank you!     Thank you for choosing [facility]  for your care. Our goal is always to provide you with excellent care. Hearing back from our patients is one way we can continue to improve our services. Please take a few minutes to complete the written survey that you may receive in the mail after your visit with us. Thank you!             Your Updated Medication List - Protect others around you: Learn how to safely use, store and throw away your medicines at www.disposemymeds.org.      Notice     This visit is during an admission. Changes to the med list made in this visit will be reflected in the After Visit Summary of the admission.

## 2018-08-13 NOTE — PROGRESS NOTES
Patient is a 28 year old female here accompanied by staff today for infusion of paclitaxel under the orders of Dr. García.  Right sided power port accessed, see previous encounter.   Today's lab values: WBC 8.7, ANC 6.4, , HGB 11.7, AST 24, ALT 50, Alkaline Phosphatase 103, Creatinine 0.44.     Psychosocial well being assessed.  New concerns-none.  If yes, patient to compete Psychosocial Distress Rating Thermometer and nurse to complete PDR dot phrase.      Patient meets parameters for today's infusion.  Denies questions or concerns regarding today's infusion and/or medications being administered.      Patient identified with two identifiers, order verified, and verbal consent for today's infusion obtained from patient. Written consent for treatment is on file and valid.    1246: IV pump verified with paclitaxel 169mg dose, drug, and rate of administration by second RN, Anita Hampton. Infusion administered per protocol. Patient tolerated infusion well, no signs or symptoms of adverse reaction noted. Patient denies pain nor discomfort.     Needle removed, tip intact. Site clean, dry and intact. Covered with a sterile bandage, slight pressure applied for 30 seconds. Pt instructed to leave bandage intact for a minimum of one hour, and to call with questions or concerns. Copy of appointments, discharge instructions, and after visit summary (AVS) provided to patient. Patient states understanding, discharged ambulatory.

## 2018-08-13 NOTE — PLAN OF CARE
"Problem: Patient Care Overview  Goal: Individualization & Mutuality  Patient will be compliant with treatment team recommendations.  Patient will report at least 6-8 hours of sleep each night.    Outcome: No Change  Patient denies SI, HI, hallucinations, depression.  Patient endorses high level of anxiety r/t chemotherapy infusion that will occur today.  Patient compliant with medications this morning.  0836:  PRN Vistaril 100 mg po administered for anxiety.  0901:  PRN Ativan 0.5 mg po administered for anxiety after speaking with JAYSON Noe.  Patient had received this PRN 8/13/18 @ 2744.  Order states daily PRN.  Per NP, can administer Ativan prior to chemotherapy appointment.  Patient states she would refused chemotherapy if she is unable to have Ativan prior.    0915:  Patient escorted to Medical Oncology (radition department on 1st floor) with  staff and security as patient had lab draw appointment moved from 1000 to 0930.  Patient will then see Dr. García prior to chemo infusion at 1100 (on 3rd floor).  1035:  Oncology appointment called.  Patient has next chemotherapy infusion next Monday 8/20/18 at approximately 1000.  They will call back when they have an exact time.  Supervisor notified.  1410: Patient returned from chemotherapy infusion.  /84  Pulse 110  Temp 99  F (37.2  C) (Tympanic)  Resp 16  Ht 1.651 m (5' 5\")  Wt 98.1 kg (216 lb 4.8 oz)  SpO2 94%  BMI 35.99 kg/m2  Patient eating snack.  Currently denies s/e of chemotherapy.  Patient states she wants to call her  to tell her she will voluntarily agree to \"commitment\" and she does not want to go to court.   notified to obtain number for .    1455:  Attempting to give scheduled phenobarbital.  Interaction warning occurred.  Called Kusum ROMAN NP: order to hold 1400 and 2100 doses of phenobarbital.      Problem: Thought Process Alteration (Adult)  Goal: Identify Related Risk Factors and Signs and " Symptoms  Patient will be compliant with medications and treatment team recommendations while on unit.    Outcome: No Change  Patient compliant with medications.   Goal: Improved Thought Process  Patient will hold reality based conversations prior to discharge.    Outcome: No Change  Patient able to hold reality based conversation.

## 2018-08-14 PROCEDURE — 25000132 ZZH RX MED GY IP 250 OP 250 PS 637: Mod: GY | Performed by: NURSE PRACTITIONER

## 2018-08-14 PROCEDURE — A9270 NON-COVERED ITEM OR SERVICE: HCPCS | Mod: GY | Performed by: NURSE PRACTITIONER

## 2018-08-14 PROCEDURE — 40000133 ZZH STATISTIC OT WARD VISIT

## 2018-08-14 PROCEDURE — 99232 SBSQ HOSP IP/OBS MODERATE 35: CPT | Performed by: NURSE PRACTITIONER

## 2018-08-14 PROCEDURE — 12400011

## 2018-08-14 PROCEDURE — 97530 THERAPEUTIC ACTIVITIES: CPT | Mod: GO

## 2018-08-14 PROCEDURE — 97165 OT EVAL LOW COMPLEX 30 MIN: CPT | Mod: GO

## 2018-08-14 RX ORDER — LORAZEPAM 0.5 MG/1
0.5 TABLET ORAL 4 TIMES DAILY PRN
Status: DISCONTINUED | OUTPATIENT
Start: 2018-08-14 | End: 2018-08-16

## 2018-08-14 RX ADMIN — AMANTADINE 100 MG: 100 CAPSULE ORAL at 08:30

## 2018-08-14 RX ADMIN — GABAPENTIN 600 MG: 300 CAPSULE ORAL at 12:44

## 2018-08-14 RX ADMIN — LORAZEPAM 0.5 MG: 0.5 TABLET ORAL at 12:44

## 2018-08-14 RX ADMIN — GABAPENTIN 600 MG: 300 CAPSULE ORAL at 08:30

## 2018-08-14 RX ADMIN — RANITIDINE 150 MG: 150 TABLET ORAL at 20:13

## 2018-08-14 RX ADMIN — GABAPENTIN 600 MG: 300 CAPSULE ORAL at 16:33

## 2018-08-14 RX ADMIN — GABAPENTIN 600 MG: 300 CAPSULE ORAL at 20:13

## 2018-08-14 RX ADMIN — AMANTADINE 100 MG: 100 CAPSULE ORAL at 20:14

## 2018-08-14 RX ADMIN — MAGNESIUM HYDROXIDE 30 ML: 400 SUSPENSION ORAL at 20:17

## 2018-08-14 RX ADMIN — NICOTINE 1 PATCH: 21 PATCH, EXTENDED RELEASE TRANSDERMAL at 08:29

## 2018-08-14 RX ADMIN — NICOTINE POLACRILEX 2 MG: 2 GUM, CHEWING ORAL at 20:18

## 2018-08-14 RX ADMIN — CELECOXIB 200 MG: 100 CAPSULE ORAL at 08:30

## 2018-08-14 RX ADMIN — NICOTINE POLACRILEX 4 MG: 2 GUM, CHEWING ORAL at 16:34

## 2018-08-14 RX ADMIN — NICOTINE POLACRILEX 4 MG: 2 GUM, CHEWING ORAL at 08:34

## 2018-08-14 RX ADMIN — NICOTINE POLACRILEX 4 MG: 2 GUM, CHEWING ORAL at 12:44

## 2018-08-14 RX ADMIN — Medication 1 G: at 08:30

## 2018-08-14 RX ADMIN — Medication 1 G: at 20:14

## 2018-08-14 RX ADMIN — CLONIDINE HYDROCHLORIDE 0.1 MG: 0.1 TABLET ORAL at 16:33

## 2018-08-14 RX ADMIN — CELECOXIB 200 MG: 100 CAPSULE ORAL at 20:13

## 2018-08-14 RX ADMIN — RANITIDINE 150 MG: 150 TABLET ORAL at 08:30

## 2018-08-14 RX ADMIN — LORAZEPAM 0.5 MG: 0.5 TABLET ORAL at 08:34

## 2018-08-14 RX ADMIN — VITAMIN A, VITAMIN C, VITAMIN D-3, VITAMIN E, VITAMIN B-1, VITAMIN B-2, NIACIN, VITAMIN B-6, CALCIUM, IRON, ZINC, COPPER 1 TABLET: 4000; 120; 400; 22; 1.84; 3; 20; 10; 1; 12; 200; 27; 25; 2 TABLET ORAL at 08:30

## 2018-08-14 RX ADMIN — LORATADINE 10 MG: 10 TABLET ORAL at 08:30

## 2018-08-14 RX ADMIN — LAMOTRIGINE 25 MG: 25 TABLET ORAL at 08:30

## 2018-08-14 RX ADMIN — LORAZEPAM 0.5 MG: 0.5 TABLET ORAL at 20:13

## 2018-08-14 ASSESSMENT — ACTIVITIES OF DAILY LIVING (ADL)
DRESS: INDEPENDENT;SCRUBS (BEHAVIORAL HEALTH)
GROOMING: INDEPENDENT
LAUNDRY: UNABLE TO COMPLETE
GROOMING: INDEPENDENT
PREVIOUS_RESPONSIBILITIES: MEAL PREP;HOUSEKEEPING;LAUNDRY;SHOPPING;MEDICATION MANAGEMENT;FINANCES
DRESS: SCRUBS (BEHAVIORAL HEALTH);INDEPENDENT
LAUNDRY: UNABLE TO COMPLETE
ORAL_HYGIENE: INDEPENDENT
ORAL_HYGIENE: INDEPENDENT

## 2018-08-14 ASSESSMENT — PATIENT HEALTH QUESTIONNAIRE - PHQ9: SUM OF ALL RESPONSES TO PHQ QUESTIONS 1-9: 13

## 2018-08-14 NOTE — PLAN OF CARE
Problem: Patient Care Overview  Goal: Individualization & Mutuality  Patient will be compliant with treatment team recommendations.  Patient will report at least 6-8 hours of sleep each night.    Outcome: Improving  Pt was tired and napping until supper time. She told me that she was feeling ok other than feeling tired. She ate most of her supper and was social with peers in the dayroom. She was given a PRN of Clonidine at 1934 for complaints of anxiety.  Pt's pulse was elevated with a high bp of 129/102.  Pt also has a low grade fever.  The on call NP was notified of the elevated pulse and BP.  Pt is most likely is responding to the chemo agent given earlier int he day. She told me that her mood is good and she is happy to be getting the help for her cancer. She said that if she did not end up in the hospital she would most likely be dead.    Pt does have some tangential thinking and struggles to stay on track during conversation,    Problem: Thought Process Alteration (Adult)  Goal: Improved Thought Process  Patient will hold reality based conversations prior to discharge.    Outcome: Improving  Pt has some tangential thinking but is able to track conversation well

## 2018-08-14 NOTE — PLAN OF CARE
Face to face end of shift report received from Brittny ABAD RN. Rounding completed. Patient observed in [facility].     Deya Lawson  8/14/2018  7:33 AM

## 2018-08-14 NOTE — PLAN OF CARE
"Problem: Patient Care Overview  Goal: Individualization & Mutuality  Patient will be compliant with treatment team recommendations.  Patient will report at least 6-8 hours of sleep each night.    Outcome: No Change  Patient denies SI, HI, hallucinations, pain.  Patient does endorse anxiety this morning.  Re quested and received PRN Ativan 0.5 mg at 0834.  Patient states she is in better spirits today.  \"I was only crabby for point 2 seconds this morning then I was better.\"  Her affect is full range and mood is anxious at times.  Patient is compliant with medications.  When scanned AM phenobarbital gave an interaction warning.  Called Kusum ROMAN NP: order AM phenobarbital, phenobarbital will be discontinued r/t interaction with medications she is getting with her chemotherapy treatments.  Patient is disheveled and untidy.  Denies side effects of yesterday's chemotherapy infusion.  Will continue to monitor.    0834 & 1244:  PRN Ativan 0.5 mg po administered for anxiety.  1310:  PRN    Problem: Thought Process Alteration (Adult)  Goal: Identify Related Risk Factors and Signs and Symptoms  Patient will be compliant with medications and treatment team recommendations while on unit.    Outcome: No Change  Patient compliant with medications and assessment.  Goal: Improved Thought Process  Patient will hold reality based conversations prior to discharge.    Outcome: No Change  Patient able to hold reality based conversation.        "

## 2018-08-14 NOTE — PLAN OF CARE
Face to face end of shift report received from Deya MEDRANO. Rounding completed. Patient observed in dayroom and introduced to nursing for the shift    Chuy Samm  8/14/2018  3:40 PM

## 2018-08-14 NOTE — PLAN OF CARE
Problem: Patient Care Overview  Goal: Team Discussion  Team Plan:   BEHAVIORAL TEAM DISCUSSION    Participants:  Kusum Cota NP, Michael Aranda NP, Toya Wolf NP, Renetta Quinteros LSW,  Geno Hargrove LSW, Edie Eugene RN, Venus Lyons RN, Deya Lawson RN, Janice Garcia Recreation Therapy, Vanessa Diez OT, Haily Fuchs OT  Progress: fair, cooperative  Continued Stay Criteria/Rationale: DC phenobarbital, changed to Ativan  Medical/Physical: cancer  Precautions:   Behavioral Orders   Procedures     Code 1 - Restrict to Unit     Routine Programming     As clinically indicated     Sexual precautions     Status 15     Every 15 minutes.     Plan: DC to [facility] vs home when done with chemo treatment, dependent on pts stability   Rationale for change in precautions or plan: none

## 2018-08-14 NOTE — PLAN OF CARE
Face to face end of shift report received from Deya MEDRANO. Rounding completed. Patient observed in bed appearing to sleep    Chuy Samm  8/13/2018  [id number]

## 2018-08-14 NOTE — PROGRESS NOTES
08/14/18 1222   Signing Clinician's Name / Credentials   Signing clinician's name / credentials Vanessa Diez OTR/L   Quick Adds   Rehab Discipline OT   Therapeutic Activity   Minutes of Treatment 30 minutes   Symptoms Noted During/After Treatment None   Treatment Detail Assisted pt with completing self-care assessment.  Pt would like to work on physical self care as well as emotional self care.  Healing touch provided for healing, balance, and coping.  Pt tolerated Chakra Connection; a full body technique that establishes an interconnection of the chakras, opening a free and unencumbered balanced flow followed by Modified Mesmeric Clearing to reduce congested energy caused by toxins of cancer treatment.        Additional Documentation   Rehab Comments pt reports feeling lighter and clearer post healing touch.  Is interested in learning more healthy coping skills including sensory tools.   OT Plan cont OT for healing touch, coping, and leisure   Total Session Time   Total Session Time (minutes) 30 minutes

## 2018-08-14 NOTE — PLAN OF CARE
Problem: Patient Care Overview  Goal: Individualization & Mutuality  Patient will be compliant with treatment team recommendations.  Patient will report at least 6-8 hours of sleep each night.    Patient observed at nurse's station at 2345 with c/o anxiety 5/10 and constipation for which she requested and was administered 1 mg risperdone and 30 mL of milk of mag. No other complaints offered at this time.     0031-Pt observed in left side lying position with regular and unlabored respirations and no complaints offered at this time.     0045-Pt appears to be asleep.     Pt has been in bed with eyes closed and regular respirations. 15 minute and PRN checks all night. No complaints offered. Will continue to monitor.      Brittny David  8/14/2018  12:32 AM

## 2018-08-14 NOTE — PROGRESS NOTES
"   08/14/18 1201   Quick Adds   Type of Visit Initial Occupational Therapy Evaluation   Living Environment   Lives With alone   Living Arrangements apartment   Home Accessibility no concerns   Functional Level Prior   Ambulation 0-->independent   Transferring 0-->independent   Toileting 0-->independent   Bathing 0-->independent   Dressing 0-->independent   Eating 0-->independent   Communication 0-->understands/communicates without difficulty   Swallowing 0-->swallows foods/liquids without difficulty   Cognition 0 - no cognition issues reported   Fall history within last six months no   Which of the above functional risks had a recent onset or change? none   General Information   Onset of Illness/Injury or Date of Surgery - Date 08/06/18   Referring Physician Kusum Cota NP   Patient/Family Goals Statement Pt reports that she would like to \"get in touch with my divine feminine self that is now healing.\"   Additional Occupational Profile Info/Pertinent History of Current Problem Pt is 28 year old female with hx of breast cancer (bilateral mastectomy april 2018) and exacerbation of MH symptoms.  Pt was brought to the ED by friend and sister with reports of hyper-Evangelical and hyper-sexual symptoms.  Pt reports her mood is good today after ALPHA stim.  Pt independent with mobility, but reports hx of L foot fracture that didnt heal correctly.  Pt reports pain in B shoulders, L foot, and occasional headaches.  Aggrivating factors include \"when Im thinking about everything the pain gets worse.\"  Interventions used with good result= ALPHA stim and healing touch.  Pt reports that medication management allows her to help maintain healthy balance.  Pt admits to increased isolation, feelings of knowing it all and all the answers, decreased trust in others, delusions of being in the illuminati, and not feeling equal to others when non med compliant.  Pt currently receiving cancer treatment - chemo 1x/week for 2 more weeks.  OT " ordered to assess healthy coping skills and offer healing touch and eduction on coping.   Precautions/Limitations no known precautions/limitations   Cognitive Status Examination   Orientation orientation to person, place and time   Level of Consciousness alert   Able to Follow Commands WNL/WFL   Personal Safety (Cognitive) WNL/WFL   Memory intact   Attention No deficits were identified   Organization/Problem Solving No deficits were identified   Executive Function No deficits were identified   Pain Assessment   Patient Currently in Pain No   Range of Motion (ROM)   ROM Comment No concerns   Strength   Strength Comments no concerns   Instrumental Activities of Daily Living (IADL)   Previous Responsibilities meal prep;housekeeping;laundry;shopping;medication management;finances   Activities of Daily Living Analysis   Impairments Contributing to Impaired Activities of Daily Living fear and anxiety;pain   General Therapy Interventions   Planned Therapy Interventions IADL retraining;groups;strengthening;home program guidelines;manual therapy  (healing touch)   Clinical Impression   Criteria for Skilled Therapeutic Interventions Met yes, treatment indicated   OT Diagnosis poor coping   Influenced by the following impairments MH exacerbation of symptoms   Assessment of Occupational Performance 1-3 Performance Deficits   Identified Performance Deficits coping, leisure, self care   Clinical Decision Making (Complexity) Low complexity   Therapy Frequency 3 times/wk   Predicted Duration of Therapy Intervention (days/wks) 2 weeks   Anticipated Discharge Disposition (TBD treatment team; pt committed)   Risks and Benefits of Treatment have been explained. Yes   Patient, Family & other staff in agreement with plan of care Yes   Clinical Impression Comments Pt would benifit from continued individualized OT treatment to address above limitations.  Healing touch, leisure skills, and healthy coping education will be the focus of  treatment.   Total Evaluation Time   Total Evaluation Time (Minutes) 30

## 2018-08-14 NOTE — PROGRESS NOTES
[facility]  Psychiatric Progress Note      Impression:   Marti is up in the lounge area using a journal this morning. She does ask if she can have alpha stim for anxiety as an alternative to using benzodiazepines. She does appreciate having them there if needed but does say she would like alternative therapies. She also likes the healing touch therapy.  Marti is accepting of the course of treatment at this time. Her speech is less pressured and rambling.     Educated regarding medication indications, risks, benefits, side effects, contraindications and possible interactions. Verbally expressed understanding.        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          Medications:       amantadine  100 mg Oral BID     celecoxib  200 mg Oral BID     fish oil-omega-3 fatty acids  1 g Oral BID     gabapentin  600 mg Oral 4x Daily     lamoTRIgine (LaMICtal) tablet 25 mg  25 mg Oral Daily     loratadine  10 mg Oral Daily     nicotine  1 patch Transdermal Daily     nicotine   Transdermal Q8H     nicotine   Transdermal Daily     PNV PRENATAL PLUS MULTIVITAMIN  1 tablet Oral Daily     ranitidine  150 mg Oral BID     risperiDONE microspheres  50 mg Intramuscular Q14 Days              10 point ROS negative though did miss one chemo treatment.        Allergies:     Allergies   Allergen Reactions     Azithromycin Anaphylaxis     Abilify [Aripiprazole] Unknown     Wellbutrin [Bupropion] Other (See Comments)     suicidal     Zithromax [Azithromycin Dihydrate]              "Psychiatric Examination:   /84  Pulse 112  Temp 97.9  F (36.6  C) (Tympanic)  Resp 14  Ht 1.651 m (5' 5\")  Wt 98.1 kg (216 lb 4.8 oz)  SpO2 96%  BMI 35.99 kg/m2  Weight is 216 lbs 4.8 oz  Body mass index is 35.99 kg/(m^2).    Appearance:  Awake, alert and cooeratove  Attitude:  coopertaive  Eye Contact: good  Mood:  better  Affect:  Less guarded  Speech:  clear, coherent and pressured speech  Psychomotor Behavior:  Slight abnormal jaw movements  Thought Process: more linear  Associations: No loosening of associations present  Thought Content:  no evidence of suicidal ideation or homicidal ideation and auditory hallucinations present  Insight:  limited  Judgment:  limited  Oriented to:  time, person, and place  Attention Span and Concentration:  fair  Recent and Remote Memory:  fair  Fund of Knowledge: appropriate  Muscle Strength and Tone: normal  Gait and Station: Normal             Labs:     Results for orders placed or performed in visit on 08/13/18   CBC with platelets diff   Result Value Ref Range    WBC 8.7 4.0 - 11.0 10e9/L    RBC Count 4.26 3.8 - 5.2 10e12/L    Hemoglobin 11.7 11.7 - 15.7 g/dL    Hematocrit 35.0 35.0 - 47.0 %    MCV 82 78 - 100 fl    MCH 27.5 26.5 - 33.0 pg    MCHC 33.4 31.5 - 36.5 g/dL    RDW 18.0 (H) 10.0 - 15.0 %    Platelet Count 283 150 - 450 10e9/L    Diff Method Automated Method     % Neutrophils 73.2 %    % Lymphocytes 14.4 %    % Monocytes 11.2 %    % Eosinophils 0.0 %    % Basophils 0.5 %    % Immature Granulocytes 0.7 %    Nucleated RBCs 0 0 /100    Absolute Neutrophil 6.4 1.6 - 8.3 10e9/L    Absolute Lymphocytes 1.3 0.8 - 5.3 10e9/L    Absolute Monocytes 1.0 0.0 - 1.3 10e9/L    Absolute Eosinophils 0.0 0.0 - 0.7 10e9/L    Absolute Basophils 0.0 0.0 - 0.2 10e9/L    Abs Immature Granulocytes 0.1 0 - 0.4 10e9/L    Absolute Nucleated RBC 0.0    Comprehensive metabolic panel   Result Value Ref Range    Sodium 137 133 - 144 mmol/L    Potassium 3.9 3.4 - 5.3 mmol/L    " Chloride 104 94 - 109 mmol/L    Carbon Dioxide 26 20 - 32 mmol/L    Anion Gap 7 3 - 14 mmol/L    Glucose 96 70 - 99 mg/dL    Urea Nitrogen 16 7 - 30 mg/dL    Creatinine 0.44 (L) 0.52 - 1.04 mg/dL    GFR Estimate >90 >60 mL/min/1.7m2    GFR Estimate If Black >90 >60 mL/min/1.7m2    Calcium 8.8 8.5 - 10.1 mg/dL    Bilirubin Total 0.2 0.2 - 1.3 mg/dL    Albumin 3.7 3.4 - 5.0 g/dL    Protein Total 7.1 6.8 - 8.8 g/dL    Alkaline Phosphatase 103 40 - 150 U/L    ALT 50 0 - 50 U/L    AST 24 0 - 45 U/L   Lactate Dehydrogenase   Result Value Ref Range    Lactate Dehydrogenase 166 81 - 234 U/L                Plan:   Discontinue phenobarbital due to interaction with chemotherapy medications  Lorazepam at a small dose, 0.5 mg up to three ties a day as needed for anxiety  Alpha stim three times a day as needed

## 2018-08-14 NOTE — PLAN OF CARE
Face to face end of shift report received from Chuy. Rounding completed. Patient observed at nurse's station at 2345 with c/o anxiety 5/10 and constipation for which she requested and was administered 1 mg risperdone and 30 mL of milk of mag. No other complaints offered at this time.     Brittny David  8/14/2018  12:02 AM

## 2018-08-14 NOTE — PLAN OF CARE
Problem: Patient Care Overview  Goal: Discharge Needs Assessment  Outcome: Improving  Spoke with pt this afternoon- She states she is doing much better. Reports her chemo treatment went well yesterday and she is happy to be here while finishing her treatments. Pt talks about her discharge plans from the hospital- Says she is going to call her  to apologize for smoking in the building in hopes she will not be evicted. Talks about following up with her many outpt MH services. Says she was supposed to be starting a job through the [employer] and is hopeful she will be able to start soon after discharge. Pt states in the past her and her  have talked about pt moving to an [facility]- Pt states she will do whatever her  wants her to do but she would prefer to keep her independence. Informed pt staff will be in contact with her JORDON Adams to coordinate aftercare plan.

## 2018-08-15 PROCEDURE — 25000132 ZZH RX MED GY IP 250 OP 250 PS 637: Mod: GY | Performed by: NURSE PRACTITIONER

## 2018-08-15 PROCEDURE — 99233 SBSQ HOSP IP/OBS HIGH 50: CPT | Performed by: NURSE PRACTITIONER

## 2018-08-15 PROCEDURE — A9270 NON-COVERED ITEM OR SERVICE: HCPCS | Mod: GY | Performed by: NURSE PRACTITIONER

## 2018-08-15 PROCEDURE — 12400011

## 2018-08-15 PROCEDURE — 97530 THERAPEUTIC ACTIVITIES: CPT | Mod: GO

## 2018-08-15 PROCEDURE — 40000133 ZZH STATISTIC OT WARD VISIT

## 2018-08-15 RX ORDER — CHLORAL HYDRATE 500 MG
1 CAPSULE ORAL 2 TIMES DAILY
Status: DISCONTINUED | OUTPATIENT
Start: 2018-08-15 | End: 2018-09-12 | Stop reason: HOSPADM

## 2018-08-15 RX ORDER — PYRIDOXINE HCL (VITAMIN B6) 25 MG
25 TABLET ORAL DAILY
Status: DISCONTINUED | OUTPATIENT
Start: 2018-08-15 | End: 2018-09-12 | Stop reason: HOSPADM

## 2018-08-15 RX ORDER — LANOLIN ALCOHOL/MO/W.PET/CERES
1000 CREAM (GRAM) TOPICAL DAILY
Status: DISCONTINUED | OUTPATIENT
Start: 2018-08-15 | End: 2018-09-12 | Stop reason: HOSPADM

## 2018-08-15 RX ADMIN — CYANOCOBALAMIN TAB 1000 MCG 1000 MCG: 1000 TAB at 10:51

## 2018-08-15 RX ADMIN — LORATADINE 10 MG: 10 TABLET ORAL at 08:05

## 2018-08-15 RX ADMIN — GABAPENTIN 600 MG: 300 CAPSULE ORAL at 12:00

## 2018-08-15 RX ADMIN — NICOTINE POLACRILEX 4 MG: 2 GUM, CHEWING ORAL at 13:06

## 2018-08-15 RX ADMIN — GABAPENTIN 600 MG: 300 CAPSULE ORAL at 16:09

## 2018-08-15 RX ADMIN — GABAPENTIN 600 MG: 300 CAPSULE ORAL at 20:16

## 2018-08-15 RX ADMIN — RANITIDINE 150 MG: 150 TABLET ORAL at 08:05

## 2018-08-15 RX ADMIN — CLONIDINE HYDROCHLORIDE 0.1 MG: 0.1 TABLET ORAL at 16:09

## 2018-08-15 RX ADMIN — LAMOTRIGINE 25 MG: 25 TABLET ORAL at 08:05

## 2018-08-15 RX ADMIN — Medication 1 G: at 20:04

## 2018-08-15 RX ADMIN — NICOTINE POLACRILEX 4 MG: 2 GUM, CHEWING ORAL at 16:10

## 2018-08-15 RX ADMIN — Medication 1 G: at 10:51

## 2018-08-15 RX ADMIN — GABAPENTIN 600 MG: 300 CAPSULE ORAL at 08:05

## 2018-08-15 RX ADMIN — LORAZEPAM 0.5 MG: 0.5 TABLET ORAL at 20:04

## 2018-08-15 RX ADMIN — RISPERIDONE 1 MG: 1 TABLET, ORALLY DISINTEGRATING ORAL at 10:13

## 2018-08-15 RX ADMIN — AMANTADINE 100 MG: 100 CAPSULE ORAL at 20:04

## 2018-08-15 RX ADMIN — Medication 25 MG: at 10:51

## 2018-08-15 RX ADMIN — CELECOXIB 200 MG: 100 CAPSULE ORAL at 20:03

## 2018-08-15 RX ADMIN — NICOTINE POLACRILEX 4 MG: 2 GUM, CHEWING ORAL at 20:04

## 2018-08-15 RX ADMIN — HYDROXYZINE PAMOATE 50 MG: 50 CAPSULE ORAL at 16:09

## 2018-08-15 RX ADMIN — VITAMIN A, VITAMIN C, VITAMIN D-3, VITAMIN E, VITAMIN B-1, VITAMIN B-2, NIACIN, VITAMIN B-6, CALCIUM, IRON, ZINC, COPPER 1 TABLET: 4000; 120; 400; 22; 1.84; 3; 20; 10; 1; 12; 200; 27; 25; 2 TABLET ORAL at 08:05

## 2018-08-15 RX ADMIN — VITAMIN D, TAB 1000IU (100/BT) 2000 UNITS: 25 TAB at 10:51

## 2018-08-15 RX ADMIN — AMANTADINE 100 MG: 100 CAPSULE ORAL at 08:05

## 2018-08-15 RX ADMIN — RANITIDINE 150 MG: 150 TABLET ORAL at 20:04

## 2018-08-15 RX ADMIN — NICOTINE POLACRILEX 4 MG: 2 GUM, CHEWING ORAL at 10:13

## 2018-08-15 RX ADMIN — LORAZEPAM 0.5 MG: 0.5 TABLET ORAL at 10:51

## 2018-08-15 RX ADMIN — NICOTINE POLACRILEX 4 MG: 2 GUM, CHEWING ORAL at 08:07

## 2018-08-15 RX ADMIN — LORAZEPAM 0.5 MG: 0.5 TABLET ORAL at 08:43

## 2018-08-15 RX ADMIN — CELECOXIB 200 MG: 100 CAPSULE ORAL at 08:05

## 2018-08-15 ASSESSMENT — ACTIVITIES OF DAILY LIVING (ADL)
ORAL_HYGIENE: INDEPENDENT
DRESS: SCRUBS (BEHAVIORAL HEALTH);INDEPENDENT
GROOMING: INDEPENDENT
LAUNDRY: UNABLE TO COMPLETE

## 2018-08-15 NOTE — PLAN OF CARE
Problem: Patient Care Overview  Goal: Individualization & Mutuality  Patient will be compliant with treatment team recommendations.  Patient will report at least 6-8 hours of sleep each night.     Patient observed up to nurse's station with c/o nightmares and asking if any of her meds cause nightmares. Writer asked pt if she still had her george patch on as they are known to have reported side effect of nightmares. Pt stated yes and handed george patch to writer before returning to bed. Will continue to monitor.

## 2018-08-15 NOTE — PROGRESS NOTES
"[facility]  Psychiatric Progress Note      Impression:   Marti is up in the [facility] area using a journal this morning. She does ask if she can have alpha stim for anxiety as an alternative to using benzodiazepines.torowdy she is telling me that she feels like she lives in a hologram. She is delusional and tells me that there are traveling musicians in her head. She also tells me that she hears things \"on that radio that change into something else though I know others can hear it.\" . She states \"im  Not saying im  Not crazy though I know I hear things and I know that other people hear them too.\". She is quite pressured. She did take some ativen just before I saq her. She did sleep well per staff report. She states she feels betrayed that her commitment was extended. She states \"i try to do the right thing by telling them I drank and they extended it\".      Educated regarding medication indications, risks, benefits, side effects, contraindications and possible interactions. Verbally expressed understanding.        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          Medications:       amantadine  100 mg Oral BID     celecoxib  200 mg Oral BID     fish oil-omega-3 fatty acids  1 g Oral BID     gabapentin  600 mg Oral 4x Daily     lamoTRIgine (LaMICtal) tablet 25 mg  25 mg Oral Daily     loratadine  10 mg Oral Daily     nicotine  1 patch Transdermal Daily     nicotine   Transdermal Q8H     nicotine   Transdermal Daily     PNV PRENATAL PLUS MULTIVITAMIN  1 " "tablet Oral Daily     ranitidine  150 mg Oral BID     risperiDONE microspheres  50 mg Intramuscular Q14 Days              10 point ROS negative though did miss one chemo treatment.        Allergies:     Allergies   Allergen Reactions     Azithromycin Anaphylaxis     Abilify [Aripiprazole] Unknown     Wellbutrin [Bupropion] Other (See Comments)     suicidal     Zithromax [Azithromycin Dihydrate]             Psychiatric Examination:   /88  Pulse 106  Temp 98.4  F (36.9  C) (Tympanic)  Resp 14  Ht 1.651 m (5' 5\")  Wt 98.1 kg (216 lb 4.8 oz)  SpO2 96%  BMI 35.99 kg/m2  Weight is 216 lbs 4.8 oz  Body mass index is 35.99 kg/(m^2).    Appearance:  Awake, alert and cooeratove  Attitude:  coopertaive  Eye Contact: good  Mood:  better  Affect:  Less guarded  Speech:  clear, coherent and pressured speech  Psychomotor Behavior:  Slight abnormal jaw movements  Thought Process: more linear  Associations: No loosening of associations present  Thought Content:  no evidence of suicidal ideation or homicidal ideation and auditory hallucinations present  Insight:  limited  Judgment:  limited  Oriented to:  time, person, and place  Attention Span and Concentration:  fair  Recent and Remote Memory:  fair  Fund of Knowledge: appropriate  Muscle Strength and Tone: normal  Gait and Station: Normal             Labs:     Results for orders placed or performed in visit on 08/13/18   CBC with platelets diff   Result Value Ref Range    WBC 8.7 4.0 - 11.0 10e9/L    RBC Count 4.26 3.8 - 5.2 10e12/L    Hemoglobin 11.7 11.7 - 15.7 g/dL    Hematocrit 35.0 35.0 - 47.0 %    MCV 82 78 - 100 fl    MCH 27.5 26.5 - 33.0 pg    MCHC 33.4 31.5 - 36.5 g/dL    RDW 18.0 (H) 10.0 - 15.0 %    Platelet Count 283 150 - 450 10e9/L    Diff Method Automated Method     % Neutrophils 73.2 %    % Lymphocytes 14.4 %    % Monocytes 11.2 %    % Eosinophils 0.0 %    % Basophils 0.5 %    % Immature Granulocytes 0.7 %    Nucleated RBCs 0 0 /100    Absolute " Neutrophil 6.4 1.6 - 8.3 10e9/L    Absolute Lymphocytes 1.3 0.8 - 5.3 10e9/L    Absolute Monocytes 1.0 0.0 - 1.3 10e9/L    Absolute Eosinophils 0.0 0.0 - 0.7 10e9/L    Absolute Basophils 0.0 0.0 - 0.2 10e9/L    Abs Immature Granulocytes 0.1 0 - 0.4 10e9/L    Absolute Nucleated RBC 0.0    Comprehensive metabolic panel   Result Value Ref Range    Sodium 137 133 - 144 mmol/L    Potassium 3.9 3.4 - 5.3 mmol/L    Chloride 104 94 - 109 mmol/L    Carbon Dioxide 26 20 - 32 mmol/L    Anion Gap 7 3 - 14 mmol/L    Glucose 96 70 - 99 mg/dL    Urea Nitrogen 16 7 - 30 mg/dL    Creatinine 0.44 (L) 0.52 - 1.04 mg/dL    GFR Estimate >90 >60 mL/min/1.7m2    GFR Estimate If Black >90 >60 mL/min/1.7m2    Calcium 8.8 8.5 - 10.1 mg/dL    Bilirubin Total 0.2 0.2 - 1.3 mg/dL    Albumin 3.7 3.4 - 5.0 g/dL    Protein Total 7.1 6.8 - 8.8 g/dL    Alkaline Phosphatase 103 40 - 150 U/L    ALT 50 0 - 50 U/L    AST 24 0 - 45 U/L   Lactate Dehydrogenase   Result Value Ref Range    Lactate Dehydrogenase 166 81 - 234 U/L                Plan:   No change in medication.   She stabilized well on IM consta 50 mg and it may take a bit of time to stabilize though she has had improvement since admssion.   Alpha stim three times a day as needed

## 2018-08-15 NOTE — PLAN OF CARE
Problem: Patient Care Overview  Goal: Individualization & Mutuality  Patient will be compliant with treatment team recommendations.  Patient will report at least 6-8 hours of sleep each night.    Outcome: Improving  Pt has been up and active all evening.  She did have an elevated BP at 1630 and was given a PRN clonidine 0.1 mg.  She did have a drop to closer until the normal range.  She was social and upbeat all shift. She told me that she feels very good after seeing OT today and going to groups that are helpful. She attended all of the available groups and was appropriate.  She used Alpha Stim later in the shift and says that it helps her a great deal. She did ask for PRN Ativan 0.5 mg with her HS medications.  She denied having any pain this evening.  Pt spoke to her  and found out that she was evicted from her apartment and they are recommending adult foster care. She told me that she is a bit sad but is working on just accepting that she needs to move and may have new opportunities.    Problem: Thought Process Alteration (Adult)  Goal: Improved Thought Process  Patient will hold reality based conversations prior to discharge.    Outcome: Improving  Pt has been able to have a normal conversations all evening

## 2018-08-15 NOTE — PLAN OF CARE
Face to face end of shift report received from Chuy. Rounding completed. Patient observed up to nurse's station with c/o nightmares and asking if any of her meds cause nightmares. Writer asked pt if she still had her george patch on as they are known to have reported side effect of nightmares. Pt stated yes and handed george patch to writer before returning to bed. Will continue to monitor.      Brittny David  8/14/2018  11:33 PM

## 2018-08-15 NOTE — PLAN OF CARE
"Problem: Patient Care Overview  Goal: Individualization & Mutuality  Patient will be compliant with treatment team recommendations.  Patient will report at least 6-8 hours of sleep each night.    Patient up on unit this morning, friendly and cooperative with assessment and scheduled medications. Patient declines nicotine patch this shift. Declines depression, SI/HI and hallucinations this shift. Attending groups appropriately throughout shift and socializing with peers. Full range affect, clear speech and making good eye contact during conversation with this writer.   0843- Requested/Received PRN Ativan 0.5 mg for anxiety rated 6/10. Patient used alpha stim prn and listened to headphones for a short period. Patient back to this writer with c/o continued anxiety. Provides a lot of insight while talking about being evicted from apartment, \"only being able to blame self\", not wanting to go to group home and being worried about medication changes.   1013- Requested/Received PRN Risperdal 1 mg for continued anxiety per provider recommendation.   1051- Patient received second dose of PRN Ativan 0.5 mg per provider.   Showered before lunch this shift. Back out to day room after lunch for a short period before laying down to rest for about 2 hours. Compliant with scheduled medications this shift. Back up walking hallways listening to headphones at end of shift. Continue to monitor at this time.     Problem: Thought Process Alteration (Adult)  Goal: Identify Related Risk Factors and Signs and Symptoms  Patient will be compliant with medications and treatment team recommendations while on unit.    Continue to monitor at this time.  Goal: Improved Thought Process  Patient will hold reality based conversations prior to discharge.    Continue to monitor at this time.       "

## 2018-08-15 NOTE — PLAN OF CARE
Problem: Patient Care Overview  Goal: Team Discussion  Team Plan:   BEHAVIORAL TEAM DISCUSSION    Participants: Jenni Varma NP, Haily Tomlinson NP, Toya Wolf NP, Anayeli LARASW, Renetta Quinteros LSW, Geno Hargrove LSW, Venus Lyons RN, Celi Melo RN, Janice Garcia Recreation Therapy, Marilyn Irvin OT, Haily Fuchs OT  Progress: fair, attending groups, bright, manic, delusional  Continued Stay Criteria/Rationale: chemo treatments, delusional, manic, possibly start scheduled oral dose of risperdal  Medical/Physical: chemotherapy - cancer  Precautions:   Behavioral Orders   Procedures     Code 1 - Restrict to Unit     Routine Programming     As clinically indicated     Sexual precautions     Status 15     Every 15 minutes.     Plan: revoked - court hearing on the 21st, DC to [facility] vs group home depending on pts stability  Rationale for change in precautions or plan: None

## 2018-08-15 NOTE — PLAN OF CARE
Face to face end of shift report received from Celi MEDRANO. Rounding completed. Patient observed in dayroom and introduced to nursing for the shift.    Chuy Quijano  8/15/2018  3:37 PM

## 2018-08-15 NOTE — PLAN OF CARE
Face to face end of shift report received from Brittny ABAD RN. Rounding completed. Patient observed sitting in day room with peers. No requests at this time.     Celi Melo  8/15/2018  7:38 AM

## 2018-08-15 NOTE — PROGRESS NOTES
08/15/18 1100   Signing Clinician's Name / Credentials   Signing clinician's name / credentials Haily Fuchs OTR/L   Quick Adds   Rehab Discipline OT   Therapeutic Activity   Minutes of Treatment 12 minutes   Symptoms Noted During/After Treatment None   Treatment Detail Issued healing affirmations.  Pt is appreciative of these and states that she wants to keep them in her pocket so they are readily available to read.  Pt is also educated in the importance of healthy leisure participation.  Educated in the 6 types of leisure.  Pt discussed her current leisure acitivites and was issued a leisure planner and interest checklist to expand leisure participation. Did not fill out planner or checklist at this time due to poor concentration, and anxiety. Pt is tearful, manic and making delusional statements about hearing what others are subconsiously saying in group by looking at their facial expressions and body language.  States that she is having a tough day.  Does excuse herself from the session early stating that she just wants to listen to music and walk to help herslef calm down.  Handouts issued on the previously stated topics.     Additional Documentation   OT Plan cont OT for healing touch, coping, and leisure   Total Session Time   Total Session Time (minutes) 12 minutes

## 2018-08-15 NOTE — PLAN OF CARE
Problem: Patient Care Overview  Goal: Discharge Needs Assessment  Outcome: Improving  Called and left message for pt's JORDON Adams this morning.     Spoke with pt's CM michael this morning who states pt did get the eviction notice from her apartment and Michael spoke with pt last evening about getting into a group home in [place] that Michael thinks would be a perfect fit. Michael states [facility] Michael states pt was agreeable to this plan- they would get pt there for further stabilization through the rest of her commitment and then look into housing. Michael states pt was supposed to start working for [employer] but that was postponed due to hospitalizations but pt will be able to continue this plan when she is back in the area. Michael states pt previously had a [place] and funding was approved for a group home- Pt will be needing an updated [place]. Michael says pt has a hearing scheduled for August 21st at 4pm in [facility] for the revocation and extension of her commitment- Michael plans on doing the updated [place] at this hearing. Did inform Michael that pt was agreeing to everything and did not want to attend the hearing. The name of the group home is [facility] through [employer]. Michael requests updated notes- Will send.    Spoke with pt this morning and reiterated conversation with JORDON dAams- pt states she is hoping she doesn't have to attend the hearing in [facility]. She states she wishes she could just look for new housing to move into when discharged from the hospital instead of going to a group home but will do whatever she needs to do. Is looking forward to starting the job with [employer]. Says when her commitment is up she is thinking of possibly relocating back to the [place].      Received court paperwork from [facility]- Pt's hearing is scheduled for August 21st at 4pm. Gave pt copy and placed copy in pt's chart.     Spoke with pt again this  "afternoon- Pt states she has been having a bad day and is feeling upset. Explains to staff that she went into the hospital voluntarily, has been honest with her alcohol use, and feels like she can never be honest with her  again. States her friend was in a group home for ten years and says it really \"messed with her.\" Pt is worried that will happen to her. States she feels like her CM Angie is being \"sneaky\" and \"lied\" in her court report. Pt requests to use the alpha stim again this afternoon- informed pt's nurse.   "

## 2018-08-16 LAB
IRON SATN MFR SERPL: 24 % (ref 15–46)
IRON SERPL-MCNC: 77 UG/DL (ref 35–180)
TIBC SERPL-MCNC: 325 UG/DL (ref 240–430)

## 2018-08-16 PROCEDURE — A9270 NON-COVERED ITEM OR SERVICE: HCPCS | Mod: GY | Performed by: NURSE PRACTITIONER

## 2018-08-16 PROCEDURE — 40000133 ZZH STATISTIC OT WARD VISIT

## 2018-08-16 PROCEDURE — 97530 THERAPEUTIC ACTIVITIES: CPT | Mod: GO

## 2018-08-16 PROCEDURE — 25000132 ZZH RX MED GY IP 250 OP 250 PS 637: Mod: GY | Performed by: NURSE PRACTITIONER

## 2018-08-16 PROCEDURE — 36415 COLL VENOUS BLD VENIPUNCTURE: CPT | Performed by: NURSE PRACTITIONER

## 2018-08-16 PROCEDURE — 12400011

## 2018-08-16 PROCEDURE — 83540 ASSAY OF IRON: CPT | Performed by: NURSE PRACTITIONER

## 2018-08-16 PROCEDURE — 86803 HEPATITIS C AB TEST: CPT | Performed by: NURSE PRACTITIONER

## 2018-08-16 PROCEDURE — 83550 IRON BINDING TEST: CPT | Performed by: NURSE PRACTITIONER

## 2018-08-16 RX ORDER — LORAZEPAM 1 MG/1
1 TABLET ORAL 2 TIMES DAILY PRN
Status: DISCONTINUED | OUTPATIENT
Start: 2018-08-16 | End: 2018-09-03

## 2018-08-16 RX ADMIN — GABAPENTIN 600 MG: 300 CAPSULE ORAL at 17:12

## 2018-08-16 RX ADMIN — NICOTINE POLACRILEX 2 MG: 2 GUM, CHEWING ORAL at 08:16

## 2018-08-16 RX ADMIN — NICOTINE POLACRILEX 4 MG: 2 GUM, CHEWING ORAL at 19:35

## 2018-08-16 RX ADMIN — Medication 1 G: at 20:14

## 2018-08-16 RX ADMIN — RANITIDINE 150 MG: 150 TABLET ORAL at 20:14

## 2018-08-16 RX ADMIN — LORAZEPAM 0.5 MG: 0.5 TABLET ORAL at 08:16

## 2018-08-16 RX ADMIN — LORAZEPAM 1 MG: 1 TABLET ORAL at 20:15

## 2018-08-16 RX ADMIN — HYDROXYZINE HYDROCHLORIDE 25 MG: 25 TABLET ORAL at 08:16

## 2018-08-16 RX ADMIN — CLONIDINE HYDROCHLORIDE 0.1 MG: 0.1 TABLET ORAL at 15:27

## 2018-08-16 RX ADMIN — VITAMIN A, VITAMIN C, VITAMIN D-3, VITAMIN E, VITAMIN B-1, VITAMIN B-2, NIACIN, VITAMIN B-6, CALCIUM, IRON, ZINC, COPPER 1 TABLET: 4000; 120; 400; 22; 1.84; 3; 20; 10; 1; 12; 200; 27; 25; 2 TABLET ORAL at 08:04

## 2018-08-16 RX ADMIN — Medication 1 G: at 08:03

## 2018-08-16 RX ADMIN — CLONIDINE HYDROCHLORIDE 0.1 MG: 0.1 TABLET ORAL at 10:19

## 2018-08-16 RX ADMIN — CLONIDINE HYDROCHLORIDE 0.1 MG: 0.1 TABLET ORAL at 19:35

## 2018-08-16 RX ADMIN — AMANTADINE 100 MG: 100 CAPSULE ORAL at 08:03

## 2018-08-16 RX ADMIN — GABAPENTIN 600 MG: 300 CAPSULE ORAL at 20:14

## 2018-08-16 RX ADMIN — CELECOXIB 200 MG: 100 CAPSULE ORAL at 20:14

## 2018-08-16 RX ADMIN — AMANTADINE 100 MG: 100 CAPSULE ORAL at 20:14

## 2018-08-16 RX ADMIN — RANITIDINE 150 MG: 150 TABLET ORAL at 08:04

## 2018-08-16 RX ADMIN — HYDROXYZINE PAMOATE 50 MG: 50 CAPSULE ORAL at 19:35

## 2018-08-16 RX ADMIN — LAMOTRIGINE 25 MG: 25 TABLET ORAL at 08:03

## 2018-08-16 RX ADMIN — GABAPENTIN 600 MG: 300 CAPSULE ORAL at 08:04

## 2018-08-16 RX ADMIN — CELECOXIB 200 MG: 100 CAPSULE ORAL at 08:03

## 2018-08-16 RX ADMIN — NICOTINE POLACRILEX 4 MG: 2 GUM, CHEWING ORAL at 09:52

## 2018-08-16 RX ADMIN — LORAZEPAM 1 MG: 1 TABLET ORAL at 14:16

## 2018-08-16 RX ADMIN — GABAPENTIN 600 MG: 300 CAPSULE ORAL at 12:40

## 2018-08-16 RX ADMIN — VITAMIN D, TAB 1000IU (100/BT) 2000 UNITS: 25 TAB at 08:03

## 2018-08-16 RX ADMIN — LORAZEPAM 0.5 MG: 0.5 TABLET ORAL at 11:36

## 2018-08-16 RX ADMIN — Medication 25 MG: at 08:03

## 2018-08-16 RX ADMIN — CYANOCOBALAMIN TAB 1000 MCG 1000 MCG: 1000 TAB at 08:03

## 2018-08-16 RX ADMIN — ACETAMINOPHEN 650 MG: 325 TABLET, FILM COATED ORAL at 06:58

## 2018-08-16 ASSESSMENT — ACTIVITIES OF DAILY LIVING (ADL)
DRESS: SCRUBS (BEHAVIORAL HEALTH)
LAUNDRY: UNABLE TO COMPLETE
LAUNDRY: UNABLE TO COMPLETE
ORAL_HYGIENE: INDEPENDENT
DRESS: SCRUBS (BEHAVIORAL HEALTH);INDEPENDENT
GROOMING: INDEPENDENT
ORAL_HYGIENE: INDEPENDENT
GROOMING: INDEPENDENT

## 2018-08-16 NOTE — PLAN OF CARE
Problem: Patient Care Overview  Goal: Discharge Needs Assessment  Outcome: Improving  Spoke with pt this morning- Informed pt that staff spoke with her CM Angie and confirmed that pt's extension on her commitment will be an additional year from August 21st. Pt states her sister came for a visit yesterday and that went well- Pt states she talked with her sister about living with her in [place] when discharged from the hospital. Pt asks if this could be an option because she feels safe in [place], has more family here, and radiation and mental health services are local. Pt states she would even consider a group home in [place]. Pt also has concerns regarding her belongings needing to be packed up in her apartment. Spoke with pt's CM Angie regarding pt's request to change locations- Angie states she doesn't think it is the best plan for pt to live with her sister and since pt is committed through [facility] she needs to stay in that location for the duration of the commitment. Angie also states pt has until the end of September to pack her belongings and can do this when she returns. Transferred call from Angie out to pt as requested.

## 2018-08-16 NOTE — PLAN OF CARE
Problem: Patient Care Overview  Goal: Individualization & Mutuality  Patient will be compliant with treatment team recommendations.  Patient will report at least 6-8 hours of sleep each night.    Outcome: Improving  Pt has been up and active all evening. She is social with peers and smiling often. She told me that she is feeling ok today and is staying open to new possibilities. She did have an elevated Pulse and BP at 1600 and was given PRN Clonidine and Atarax with good results. She visited with her sister during visiting hours.  She used the Alpha Stim late in the shift and reported that it helped a great deal with her anxiety. She was given PRN 0.5 mg Ativan with her HS medications for her anxiety and reported good results.    Problem: Thought Process Alteration (Adult)  Goal: Improved Thought Process  Patient will hold reality based conversations prior to discharge.    Outcome: Improving  Pt has been able to have reality based converations

## 2018-08-16 NOTE — PROGRESS NOTES
"[facility]    Spiritual Health Progress Note    Date of Service (when I saw the patient): 08/16/2018     Assessment & Plan   Marti Javier is a 28 year old female who was admitted on 8/6/2018.  Introduced the patient as an initial visit to Spiritual Health Services. Also, a Spiritual Consult request.  This  knew Sita from before.  She had explained some of her frustrations with her options for discharge.  Asked some general questions about mumtaz.  She said she \"still believes in [place]\" but is confused about some things like why she feels trapped where she is at.  Encouraged patience and the process to get her in a more healthy state and place.    Rev. Kenn Savage  Volunteer   "

## 2018-08-16 NOTE — PLAN OF CARE
Problem: Patient Care Overview  Goal: Individualization & Mutuality  Patient will be compliant with treatment team recommendations.  Patient will report at least 6-8 hours of sleep each night.    Outcome: No Change  Pt has been in bed with eyes closed and regular respirations x 5 hours. 15 minute and PRN checks all night. No complaints offered. Will continue to monitor.    0658 Pt. Administer PRN tylenol 650 mg po Per Pt. request for bilateral foot pain.  Zakia Javier  8/16/2018  5:15 AM

## 2018-08-16 NOTE — PLAN OF CARE
Face to face end of shift report received from Zakia MCGOWAN RN. Rounding completed. Patient observed.     Renay Ruff  8/16/2018  7:34 AM

## 2018-08-16 NOTE — PROGRESS NOTES
08/16/18 0900   Signing Clinician's Name / Credentials   Signing clinician's name / credentials Marilyn Irvin OTR/L   Quick Adds   Rehab Discipline OT   Therapeutic Activity   Minutes of Treatment 38 minutes   Symptoms Noted During/After Treatment None   Treatment Detail Healing touch provided for healing, balance, and coping.  Pt tolerated Chakra Connection; a full body technique that establishes an interconnection of the chakras, opening a free and unencumbered balanced flow followed by Modified Mesmeric Clearing to reduce congested energy caused by toxins of cancer treatment. Pain drain completed to (L) ankle. Patient notes her chest feels tight and discussed stretches and massage. Call placed to oncologist for approval of implementing these treatments.         Additional Documentation   OT Plan Follow up with oncologist. cont OT for healing touch, coping, and leisure   Total Session Time   Total Session Time (minutes) 38 minutes

## 2018-08-16 NOTE — PLAN OF CARE
Problem: Patient Care Overview  Goal: Team Discussion  Team Plan:   BEHAVIORAL TEAM DISCUSSION    Participants: Jenni Varma NP, Haily Tomlinson NP, Toya Wolf NP, Renetta Quinteros LSW, Geno Hargrove LSW, Edie Eugene RN, Venus Lyons RN, Renay Ruff RN, Janice Garcia Recreation Therapy, Marilyn Irvin OT, Haily Fuchs OT  Progress: good, calm, less delusional  Continued Stay Criteria/Rationale: continue to monitor for medication effectiveness  Medical/Physical: chemo - active cancer  Precautions:   Behavioral Orders   Procedures     Code 1 - Restrict to Unit     Routine Programming     As clinically indicated     Sexual precautions     Status 15     Every 15 minutes.     Plan: court on the 21st to extend committment, [place] [facility]  Rationale for change in precautions or plan: none

## 2018-08-16 NOTE — PLAN OF CARE
Face to face end of shift report received from Rogerio MEDRANO. Rounding completed. Patient observed in dayroom and introduced to nursing for the shift    Chuyedgar WalkerSamm  8/16/2018  3:32 PM

## 2018-08-16 NOTE — PLAN OF CARE
"Problem: Patient Care Overview  Goal: Individualization & Mutuality  Patient will be compliant with treatment team recommendations.  Patient will report at least 6-8 hours of sleep each night.    Outcome: Improving  Patient denies [facility] hall. She contracts for safety. She admits to anxiety and depression. Patient given PRN ativan and atarax at 0816 for anxiety. She also endorses pain to her feet. Scheduled gabapentin given with morning medications. She states that she feels this is helpful sometimes. She is requesting that her lamictal be increased to 50 mg daily. Patient request was given to nurse practitioner. She states that she is feeling good today and is hopeful that today will be a good day.  1020: Patient requested and received PRN clonidine for high pulse and feeling like her heart was pounding.  1145: Patient got off the phone and came to the window and asked if she could have another dose of ativan. This writer informed her that it was too early. Patient became irritable \"that's ridiculous, I need it and I think I should be able to have some\". This writer apologized. \"Well can I have something then?\" This writer read patient what options were available. Patient stated, \"I'll take whatever I can have\". Patient was speaking to nurse practitioner at this time, who gave this writer permission to give PRN dose of ativan. Patient irritable when this writer first re-approached her, but then stated that she was just frustrated with the phone call that she had. Patient also verbalizes sadness and frustration due to not having control over where she is going. Patient tearful as well. Patient verbalizes that drugs and music are the only things that help her cope with stress. Patient encouraged to listen to headphones.   1300: Patient irritable with this writer and other staff when she was approached to get vitals taken and receive her scheduled gabapentin. Patient states that she does not like to be woken up and " "that \"you had someone checking in on me every 15 minutes and you couldn't tell that I was asleep?\" Patient did allow vitals and she was compliant with scheduled medications. She stated that the ativan was helpful \"until you came in here and woke me up\".  1420: Patient requesting PRN ativan for anxiety 8/10. Patient informed that dose was changed to 1 mg twice a day as needed, so she would only be able to have one more dose today. Patient verbalizes understanding but continue to want this. Patient was given medication at 1416. Patient still irritable, states that she was woken up from her nap by the therapy dog barking. Patient returned to her room after taking medication.     Problem: Thought Process Alteration (Adult)  Goal: Identify Related Risk Factors and Signs and Symptoms  Patient will be compliant with medications and treatment team recommendations while on unit.    Outcome: No Change  Patient refused scheduled nicotine patch and claritin.  Goal: Improved Thought Process  Patient will hold reality based conversations prior to discharge.    Outcome: No Change  Patient conversations with this writer are reality-based      "

## 2018-08-16 NOTE — PLAN OF CARE
Face to face end of shift report received from Chuy MOYA RN. Rounding completed. Patient observed.     Zakia Javier  8/15/2018  11:50 PM

## 2018-08-17 LAB — HCV AB SERPL QL IA: NONREACTIVE

## 2018-08-17 PROCEDURE — 12400011

## 2018-08-17 PROCEDURE — A9270 NON-COVERED ITEM OR SERVICE: HCPCS | Mod: GY | Performed by: NURSE PRACTITIONER

## 2018-08-17 PROCEDURE — 25000132 ZZH RX MED GY IP 250 OP 250 PS 637: Mod: GY | Performed by: NURSE PRACTITIONER

## 2018-08-17 PROCEDURE — 99233 SBSQ HOSP IP/OBS HIGH 50: CPT | Performed by: NURSE PRACTITIONER

## 2018-08-17 RX ORDER — GABAPENTIN 300 MG/1
300 CAPSULE ORAL 2 TIMES DAILY PRN
Status: DISCONTINUED | OUTPATIENT
Start: 2018-08-17 | End: 2018-09-12 | Stop reason: HOSPADM

## 2018-08-17 RX ORDER — AMANTADINE HYDROCHLORIDE 100 MG/1
100 CAPSULE, GELATIN COATED ORAL DAILY PRN
Status: DISCONTINUED | OUTPATIENT
Start: 2018-08-17 | End: 2018-09-12 | Stop reason: HOSPADM

## 2018-08-17 RX ORDER — SACCHAROMYCES BOULARDII 250 MG
250 CAPSULE ORAL 2 TIMES DAILY
Status: DISCONTINUED | OUTPATIENT
Start: 2018-08-17 | End: 2018-09-12 | Stop reason: HOSPADM

## 2018-08-17 RX ADMIN — CLONIDINE HYDROCHLORIDE 0.1 MG: 0.1 TABLET ORAL at 13:06

## 2018-08-17 RX ADMIN — GABAPENTIN 600 MG: 300 CAPSULE ORAL at 12:10

## 2018-08-17 RX ADMIN — NICOTINE POLACRILEX 4 MG: 2 GUM, CHEWING ORAL at 23:33

## 2018-08-17 RX ADMIN — VITAMIN A, VITAMIN C, VITAMIN D-3, VITAMIN E, VITAMIN B-1, VITAMIN B-2, NIACIN, VITAMIN B-6, CALCIUM, IRON, ZINC, COPPER 1 TABLET: 4000; 120; 400; 22; 1.84; 3; 20; 10; 1; 12; 200; 27; 25; 2 TABLET ORAL at 08:02

## 2018-08-17 RX ADMIN — RANITIDINE 150 MG: 150 TABLET ORAL at 08:08

## 2018-08-17 RX ADMIN — NICOTINE POLACRILEX 4 MG: 2 GUM, CHEWING ORAL at 20:54

## 2018-08-17 RX ADMIN — NICOTINE POLACRILEX 2 MG: 2 GUM, CHEWING ORAL at 10:55

## 2018-08-17 RX ADMIN — Medication 1 G: at 08:02

## 2018-08-17 RX ADMIN — AMANTADINE 100 MG: 100 CAPSULE ORAL at 20:53

## 2018-08-17 RX ADMIN — Medication 1 G: at 20:54

## 2018-08-17 RX ADMIN — CELECOXIB 200 MG: 100 CAPSULE ORAL at 20:53

## 2018-08-17 RX ADMIN — RANITIDINE 150 MG: 150 TABLET ORAL at 20:53

## 2018-08-17 RX ADMIN — CYANOCOBALAMIN TAB 1000 MCG 1000 MCG: 1000 TAB at 08:02

## 2018-08-17 RX ADMIN — GABAPENTIN 300 MG: 300 CAPSULE ORAL at 14:42

## 2018-08-17 RX ADMIN — CELECOXIB 200 MG: 100 CAPSULE ORAL at 08:02

## 2018-08-17 RX ADMIN — CLONIDINE HYDROCHLORIDE 0.1 MG: 0.1 TABLET ORAL at 06:34

## 2018-08-17 RX ADMIN — NICOTINE POLACRILEX 4 MG: 2 GUM, CHEWING ORAL at 13:06

## 2018-08-17 RX ADMIN — MAGNESIUM HYDROXIDE 30 ML: 400 SUSPENSION ORAL at 11:45

## 2018-08-17 RX ADMIN — NICOTINE POLACRILEX 4 MG: 2 GUM, CHEWING ORAL at 19:52

## 2018-08-17 RX ADMIN — GABAPENTIN 600 MG: 300 CAPSULE ORAL at 18:03

## 2018-08-17 RX ADMIN — GABAPENTIN 600 MG: 300 CAPSULE ORAL at 20:53

## 2018-08-17 RX ADMIN — Medication 250 MG: at 22:43

## 2018-08-17 RX ADMIN — CLONIDINE HYDROCHLORIDE 0.1 MG: 0.1 TABLET ORAL at 16:36

## 2018-08-17 RX ADMIN — NICOTINE POLACRILEX 2 MG: 2 GUM, CHEWING ORAL at 08:12

## 2018-08-17 RX ADMIN — Medication 25 MG: at 08:01

## 2018-08-17 RX ADMIN — LORATADINE 10 MG: 10 TABLET ORAL at 08:02

## 2018-08-17 RX ADMIN — LAMOTRIGINE 25 MG: 25 TABLET ORAL at 08:02

## 2018-08-17 RX ADMIN — VITAMIN D, TAB 1000IU (100/BT) 2000 UNITS: 25 TAB at 08:08

## 2018-08-17 RX ADMIN — AMANTADINE 100 MG: 100 CAPSULE ORAL at 08:02

## 2018-08-17 RX ADMIN — GABAPENTIN 600 MG: 300 CAPSULE ORAL at 08:01

## 2018-08-17 RX ADMIN — NICOTINE POLACRILEX 2 MG: 2 GUM, CHEWING ORAL at 10:56

## 2018-08-17 ASSESSMENT — ACTIVITIES OF DAILY LIVING (ADL)
DRESS: SCRUBS (BEHAVIORAL HEALTH);INDEPENDENT
ORAL_HYGIENE: INDEPENDENT
LAUNDRY: UNABLE TO COMPLETE
ORAL_HYGIENE: INDEPENDENT
GROOMING: INDEPENDENT
DRESS: SCRUBS (BEHAVIORAL HEALTH)
GROOMING: INDEPENDENT

## 2018-08-17 NOTE — PLAN OF CARE
Face to face end of shift report received from Chuy MOYA RN. Rounding completed. Patient observed.     Zakia Javier  8/16/2018  11:54 PM

## 2018-08-17 NOTE — PLAN OF CARE
Problem: Patient Care Overview  Goal: Discharge Needs Assessment  Outcome: Improving  Pt has been up on the unit this afternoon- walks hallways and listens to music. Provided pt with copy of termination of lease. She denies any questions or concerns. Will send updated notes to [facility] on Monday for pt's hearing on the 21st at 4pm.

## 2018-08-17 NOTE — PLAN OF CARE
"Problem: Patient Care Overview  Goal: Individualization & Mutuality  Patient will be compliant with treatment team recommendations.  Patient will report at least 6-8 hours of sleep each night.    Outcome: Therapy, progress toward functional goals is gradual  Patient denies SI, HI, monsalve., pain, and anxiety. She contracts for safety. She states that she is doing \"ok\" this morning. She admits to depression due to her legal status. She got up for breakfast this morning, but has been sleeping much of this morning.   1400: Patient pulse was elevated with noon vitals. PRN clonidine given at 1306 for this. Patient is bright today. She states that she is feeling better after talking to the nurse practitioner because her medications are going to be adjusted. She verbalizes that she is looking forward to being taken off of the ativan, because she wants to leave here with no addictions. She has been walking the halls listening to music and socializing with peers.  1445: Patient requested and received PRN gabapentin for high anxiety. She had been using the alpha stim, but got a phone call so she stopped. She declined to use it after.    Problem: Thought Process Alteration (Adult)  Goal: Identify Related Risk Factors and Signs and Symptoms  Patient will be compliant with medications and treatment team recommendations while on unit.    Outcome: No Change  Patient has been compliant with medications and treatments.  Goal: Improved Thought Process  Patient will hold reality based conversations prior to discharge.    Outcome: No Change  Patient is able to hold reality based conversations      "

## 2018-08-17 NOTE — PLAN OF CARE
Problem: Patient Care Overview  Goal: Plan of Care/Patient Progress Review  Outcome: No Change  Pt has been in bed with eyes closed and regular respirations X 7 hours. 15 minute and PRN checks all night. No complaints offered. Will continue to monitor.    0634 Pt. Administered PRN Clonidine 0.1 mg po for pulse of 107.     Zakia Javier  8/17/2018  5:55 AM

## 2018-08-17 NOTE — PLAN OF CARE
"Problem: Patient Care Overview  Goal: Individualization & Mutuality  Patient will be compliant with treatment team recommendations.  Patient will report at least 6-8 hours of sleep each night.    Outcome: Improving  Patient had a full range affect and has been social with peers. Speech was pressured. Pt's pulse prior to supper was 122. Pt received PRN Clonidine at 1306 and was requesting another dose - provider was called at 1630 and ordered to administer another 0.1 mg of PRN Clonidine, which was given at 1636. When this writer went to administer patient's scheduled Gabapentin with supper, patient care advisory popped up stating \"dose is being charted too close to the last administration time based on the ordered frequency.\" Pharmacy was called and recommended spacing Gabapentin \"3 hours apart.\" Patient was informed of this and became irritable and stated \"April told me I could have it 90 minutes after my scheduled dose. It just pisses me off because I've taken way higher doses in the past.\" No evidence of delusional thinking noted. She denied homicidal and suicidal ideation.     Problem: Thought Process Alteration (Adult)  Goal: Improved Thought Process  Patient will hold reality based conversations prior to discharge.    Outcome: Improving  Patient is able to have a reality based conversation.       "

## 2018-08-17 NOTE — PLAN OF CARE
Problem: Patient Care Overview  Goal: Individualization & Mutuality  Patient will be compliant with treatment team recommendations.  Patient will report at least 6-8 hours of sleep each night.    Outcome: No Change  Pt has been withdrawn to her room most of the shift.  She told me that she is really struggling with anxiety and is very frustrated about being here so long. She had an elevated BP and pulse at the beginning of the shift and was given Clonidine 0.1 mg at 1530.  She took a nap until supper. She was given Clonidine and Atarax 50 at 2000 for her anxiety and used the Alpha Stim.  Pt says she just does not know what to do to deal with the being so bored.  She was given PRN Ativan 1 mg with her HS medications.  Pt's affect is very sad, flat, depressed and irritable.  She has been listening to music and coloring as a coping skill.    Problem: Thought Process Alteration (Adult)  Goal: Improved Thought Process  Patient will hold reality based conversations prior to discharge.    Outcome: Improving  Pt is able to have reality based conversations

## 2018-08-17 NOTE — PLAN OF CARE
Face to face end of shift report received from Rneay CASIANO RN. Rounding completed. Patient observed in the lounge.     Barby Fragoso  8/17/2018  3:45 PM

## 2018-08-17 NOTE — PLAN OF CARE
Problem: Patient Care Overview  Goal: Team Discussion  Team Plan:   BEHAVIORAL TEAM DISCUSSION    Participants: Jenni Varma NP, Haily Tomlinson NP, Betty Sevilla NP, Renetta Quinteros LSW,  Geno Hargrove LSW, April Marr RN, Marilyn Irvin OT, Haily Fuchs OT  Progress: fair  Continued Stay Criteria/Rationale: continue current med regimen, monitor for medication effectiveness  Medical/Physical: chemo- active cancer  Precautions:   Behavioral Orders   Procedures     Code 1 - Restrict to Unit     Routine Programming     As clinically indicated     Sexual precautions     Status 15     Every 15 minutes.     Plan: DC to [facility] after court and when chemo is done  Rationale for change in precautions or plan: None

## 2018-08-17 NOTE — PROGRESS NOTES
"[facility]  Psychiatric Progress Note      Impression:   Marti was very agitated yesterday after she spoke with her  and  Was told that she could not move back to [facility]. She feels [facility] has more services here and states \"i wouldn't even care if I lived in a group home here.\" she yelled at her  over the phone. She became tearful and agitated though no aggression. I did increase her ativan which she did request. sheis going to be going to a group home upon DC and meds will be given to her. She continues to use alpha stim. Her delusions have decreased. She does have some sx of akathesia though has decreased significnalty.     She is bright today and not angry. She states she is going to make the best out of her situation of not being able to move to [place]. She still has some delusions though has insight and at times laughs and states \"that just sounded crazy. Theres no doubt I have schizophrenia\". She talks about taking trips in her mind back to [place]. She doesn't appear preoccupied. She is asking for gabpentin to be increased and ativan to be stopped. Will add extra prn dose of gabapentin    Educated regarding medication indications, risks, benefits, side effects, contraindications and possible interactions. Verbally expressed understanding.        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          Medications:       amantadine  100 mg Oral BID     celecoxib  200 mg Oral BID     cholecalciferol  2,000 " "Units Oral Daily     cyanocobalamin  1,000 mcg Oral Daily     fish oil-omega-3 fatty acids  1 g Oral BID     fish oil-omega-3 fatty acids  1 g Oral BID     gabapentin  600 mg Oral 4x Daily     lamoTRIgine (LaMICtal) tablet 25 mg  25 mg Oral Daily     loratadine  10 mg Oral Daily     nicotine  1 patch Transdermal Daily     nicotine   Transdermal Q8H     nicotine   Transdermal Daily     PNV PRENATAL PLUS MULTIVITAMIN  1 tablet Oral Daily     pyridOXINE  25 mg Oral Daily     ranitidine  150 mg Oral BID     risperiDONE microspheres  50 mg Intramuscular Q14 Days              10 point ROS negative though did miss one chemo treatment.        Allergies:     Allergies   Allergen Reactions     Azithromycin Anaphylaxis     Abilify [Aripiprazole] Unknown     Wellbutrin [Bupropion] Other (See Comments)     suicidal     Zithromax [Azithromycin Dihydrate]             Psychiatric Examination:   /93  Pulse 107  Temp 97.6  F (36.4  C) (Tympanic)  Resp 16  Ht 1.651 m (5' 5\")  Wt 98.1 kg (216 lb 4.8 oz)  SpO2 97%  BMI 35.99 kg/m2  Weight is 216 lbs 4.8 oz  Body mass index is 35.99 kg/(m^2).    Appearance:  Awake, alert and cooeratove  Attitude:  coopertaive  Eye Contact: good  Mood:  better  Affect:  Bright   Speech:  clear, coherent and pressured speech  Psychomotor Behavior:  Mild restlessness at times  Thought Process: more linear  Associations: No loosening of associations present  Thought Content:  no evidence of suicidal ideation or homicidal ideation and auditory hallucinations present  Insight:  limited  Judgment:  limited  Oriented to:  time, person, and place  Attention Span and Concentration:  fair  Recent and Remote Memory:  fair  Fund of Knowledge: appropriate  Muscle Strength and Tone: normal  Gait and Station: Normal             Labs:     Results for orders placed or performed during the hospital encounter of 08/06/18   Iron and iron binding capacity   Result Value Ref Range    Iron 77 35 - 180 ug/dL    Iron " Binding Cap 325 240 - 430 ug/dL    Iron Saturation Index 24 15 - 46 %                Plan:   Ativan stopped and gabapentin increased  Extra dose of prn amantadine  florastor added per request  Iron levels are normal.   She stabilized well on IM consta 50 mg and it may take a bit of time to stabilize though she has had improvement since admssion.   Alpha stim three times a day as needed

## 2018-08-17 NOTE — PLAN OF CARE
Face to face end of shift report received from Zakia MCGOWAN RN. Rounding completed. Patient observed.     Renay Ruff  8/17/2018  7:37 AM

## 2018-08-18 PROCEDURE — 12400011

## 2018-08-18 PROCEDURE — A9270 NON-COVERED ITEM OR SERVICE: HCPCS | Mod: GY | Performed by: NURSE PRACTITIONER

## 2018-08-18 PROCEDURE — 99232 SBSQ HOSP IP/OBS MODERATE 35: CPT | Performed by: NURSE PRACTITIONER

## 2018-08-18 PROCEDURE — 25000132 ZZH RX MED GY IP 250 OP 250 PS 637: Mod: GY | Performed by: NURSE PRACTITIONER

## 2018-08-18 RX ADMIN — VITAMIN D, TAB 1000IU (100/BT) 2000 UNITS: 25 TAB at 08:11

## 2018-08-18 RX ADMIN — LAMOTRIGINE 25 MG: 25 TABLET ORAL at 08:10

## 2018-08-18 RX ADMIN — CLONIDINE HYDROCHLORIDE 0.1 MG: 0.1 TABLET ORAL at 10:49

## 2018-08-18 RX ADMIN — CYANOCOBALAMIN TAB 1000 MCG 1000 MCG: 1000 TAB at 08:11

## 2018-08-18 RX ADMIN — NICOTINE POLACRILEX 4 MG: 2 GUM, CHEWING ORAL at 17:26

## 2018-08-18 RX ADMIN — Medication 25 MG: at 08:39

## 2018-08-18 RX ADMIN — NICOTINE POLACRILEX 4 MG: 2 GUM, CHEWING ORAL at 01:16

## 2018-08-18 RX ADMIN — CELECOXIB 200 MG: 100 CAPSULE ORAL at 08:11

## 2018-08-18 RX ADMIN — AMANTADINE 100 MG: 100 CAPSULE ORAL at 08:12

## 2018-08-18 RX ADMIN — Medication 1 G: at 20:41

## 2018-08-18 RX ADMIN — GABAPENTIN 600 MG: 300 CAPSULE ORAL at 08:11

## 2018-08-18 RX ADMIN — NICOTINE POLACRILEX 4 MG: 2 GUM, CHEWING ORAL at 13:58

## 2018-08-18 RX ADMIN — RANITIDINE 150 MG: 150 TABLET ORAL at 08:11

## 2018-08-18 RX ADMIN — Medication 1 G: at 08:11

## 2018-08-18 RX ADMIN — NICOTINE POLACRILEX 4 MG: 2 GUM, CHEWING ORAL at 07:31

## 2018-08-18 RX ADMIN — AMANTADINE 100 MG: 100 CAPSULE ORAL at 20:41

## 2018-08-18 RX ADMIN — NICOTINE POLACRILEX 4 MG: 2 GUM, CHEWING ORAL at 10:46

## 2018-08-18 RX ADMIN — HYDROXYZINE PAMOATE 100 MG: 50 CAPSULE ORAL at 10:47

## 2018-08-18 RX ADMIN — LORAZEPAM 1 MG: 1 TABLET ORAL at 19:08

## 2018-08-18 RX ADMIN — Medication 250 MG: at 08:11

## 2018-08-18 RX ADMIN — GABAPENTIN 600 MG: 300 CAPSULE ORAL at 13:01

## 2018-08-18 RX ADMIN — CLONIDINE HYDROCHLORIDE 0.1 MG: 0.1 TABLET ORAL at 02:55

## 2018-08-18 RX ADMIN — VITAMIN A, VITAMIN C, VITAMIN D-3, VITAMIN E, VITAMIN B-1, VITAMIN B-2, NIACIN, VITAMIN B-6, CALCIUM, IRON, ZINC, COPPER 1 TABLET: 4000; 120; 400; 22; 1.84; 3; 20; 10; 1; 12; 200; 27; 25; 2 TABLET ORAL at 08:11

## 2018-08-18 RX ADMIN — NICOTINE POLACRILEX 4 MG: 2 GUM, CHEWING ORAL at 09:50

## 2018-08-18 RX ADMIN — MAGNESIUM HYDROXIDE 30 ML: 400 SUSPENSION ORAL at 00:06

## 2018-08-18 RX ADMIN — Medication 250 MG: at 20:41

## 2018-08-18 RX ADMIN — LORATADINE 10 MG: 10 TABLET ORAL at 08:12

## 2018-08-18 RX ADMIN — RANITIDINE 150 MG: 150 TABLET ORAL at 20:41

## 2018-08-18 RX ADMIN — GABAPENTIN 300 MG: 300 CAPSULE ORAL at 02:55

## 2018-08-18 RX ADMIN — GABAPENTIN 600 MG: 300 CAPSULE ORAL at 20:40

## 2018-08-18 RX ADMIN — NICOTINE 1 PATCH: 21 PATCH, EXTENDED RELEASE TRANSDERMAL at 08:09

## 2018-08-18 RX ADMIN — GABAPENTIN 600 MG: 300 CAPSULE ORAL at 17:26

## 2018-08-18 RX ADMIN — CELECOXIB 200 MG: 100 CAPSULE ORAL at 20:41

## 2018-08-18 RX ADMIN — Medication 1 G: at 08:12

## 2018-08-18 RX ADMIN — CLONIDINE HYDROCHLORIDE 0.1 MG: 0.1 TABLET ORAL at 17:26

## 2018-08-18 ASSESSMENT — ACTIVITIES OF DAILY LIVING (ADL)
ORAL_HYGIENE: INDEPENDENT
GROOMING: INDEPENDENT
DRESS: SCRUBS (BEHAVIORAL HEALTH);INDEPENDENT
GROOMING: INDEPENDENT

## 2018-08-18 NOTE — PLAN OF CARE
Problem: Patient Care Overview  Goal: Individualization & Mutuality  Patient will be compliant with treatment team recommendations.  Patient will report at least 6-8 hours of sleep each night.    Outcome: Improving  Pt upbeat and happy- feels hopeful with future. Interacting with peers and staff. Denies depression at this time. Does not appear overly manic but did request and receive Vistaril 100 mg po and Clonidine for anxiety after phone call  At 1047 and 1049.  . Showered and applied makeup. Cooperative and pleasant. Compliant with medication regime    1130- pt currently sleeping  1210- out for lunch- relaxed at this time

## 2018-08-18 NOTE — PLAN OF CARE
"Problem: Patient Care Overview  Goal: Individualization & Mutuality  Patient will be compliant with treatment team recommendations.  Patient will report at least 6-8 hours of sleep each night.    Outcome: Therapy, progress toward functional goals is gradual  Pt awake and playing ernie with peers. She is maintaining appropriate boundaries and presents with bright and full range affect. No concerns or issues noted at this time other than pt c/o constipation for which she was administered milk of magnesia at 0011. She has not reported any results at this time yet. Will continue to monitor.    0255- Pt to nurse's station with c/o anxiety and feelings of elevated pulse. Checked pulse and got 103, /96 and rated anxiety as \"climbing and high already\". Pt was administered PRN clonidine and gabapentin which she reported as being effective for these symptoms in the past. Will continue to monitor.     0345- Pt sleeping in bed  "

## 2018-08-18 NOTE — PLAN OF CARE
Face to face end of shift report received from Barby Mccauley completed. Patient observed sitting in lounge playing ernie with peers with request for milk of mag for c/o constipation. Milk of mag was administered as requested and no other complaints offered at this time.     Brittny David  8/18/2018  12:11 AM

## 2018-08-18 NOTE — PLAN OF CARE
Face to face end of shift report received from Brittny UMANA RN. Rounding completed. Patient observed.     Alivia AReji Powell  8/18/2018  7:33 AM      Brittny UMANA RN

## 2018-08-18 NOTE — PROGRESS NOTES
"[facility]  Psychiatric Progress Note    Subjective   This is a 28 year old female with schizoaffective disorder, bipolar type and polysubstance abuse. This am pt was upbeat, joking with writer. Pt endorses some delusions \"but nothing like they were before\". Pt appears hypomanic at times chatting incessantly w/ others on the unit. Is able to redirect. Does not appear preoccupied. Denies feelings of depression or anxiety.    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          Medications:       amantadine  100 mg Oral BID     celecoxib  200 mg Oral BID     cholecalciferol  2,000 Units Oral Daily     cyanocobalamin  1,000 mcg Oral Daily     fish oil-omega-3 fatty acids  1 g Oral BID     fish oil-omega-3 fatty acids  1 g Oral BID     gabapentin  600 mg Oral 4x Daily     lamoTRIgine (LaMICtal) tablet 25 mg  25 mg Oral Daily     loratadine  10 mg Oral Daily     nicotine  1 patch Transdermal Daily     nicotine   Transdermal Q8H     nicotine   Transdermal Daily     PNV PRENATAL PLUS MULTIVITAMIN  1 tablet Oral Daily     pyridOXINE  25 mg Oral Daily     ranitidine  150 mg Oral BID     risperiDONE microspheres  50 mg Intramuscular Q14 Days     saccharomyces boulardii  250 mg Oral BID     acetaminophen, alum & mag hydroxide-simethicone, amantadine, cloNIDine, gabapentin, hydrOXYzine, hydrOXYzine (VISTARIL) capsule  mg, LORazepam, magnesium hydroxide, nicotine polacrilex, risperiDONE          Allergies:     Allergies   Allergen Reactions     " "Azithromycin Anaphylaxis     Abilify [Aripiprazole] Unknown     Wellbutrin [Bupropion] Other (See Comments)     suicidal     Zithromax [Azithromycin Dihydrate]             Psychiatric Examination:   /96  Pulse 103  Temp 97.1  F (36.2  C) (Tympanic)  Resp 14  Ht 1.651 m (5' 5\")  Wt 98.1 kg (216 lb 4.8 oz)  SpO2 99%  BMI 35.99 kg/m2  Weight is 216 lbs 4.8 oz  Body mass index is 35.99 kg/(m^2).    Appearance:  awake, alert, adequately groomed and dressed in hospital scrubs  Attitude:  cooperative  Eye Contact:  good  Mood:  good  Affect:  mood congruent, intensity is heightened and intensity is dramatic  Speech:  rambling  Psychomotor Behavior:  no evidence of tardive dyskinesia, dystonia, or tics and intact station, gait and muscle tone  Thought Process:  goal oriented  Associations:  no loose associations  Thought Content:  no evidence of suicidal ideation or homicidal ideation  Insight:  fair  Judgment:  fair  Oriented to:  time, person, and place  Attention Span and Concentration:  fair  Recent and Remote Memory:  fair  Fund of Knowledge: appropriate  Muscle Strength and Tone: normal  Gait and Station: Normal  Perception: no perceptual disturbance noted         Labs:   No results found for this or any previous visit (from the past 24 hour(s)).          Assessment/ Plan:    Continue on current medications.    "

## 2018-08-18 NOTE — PLAN OF CARE
Face to face end of shift report received from Alivia FARLEY RN. Rounding completed. Patient observed in the lounge.     Barby Fragoso  8/18/2018  3:53 PM

## 2018-08-19 PROCEDURE — A9270 NON-COVERED ITEM OR SERVICE: HCPCS | Mod: GY | Performed by: NURSE PRACTITIONER

## 2018-08-19 PROCEDURE — 25000132 ZZH RX MED GY IP 250 OP 250 PS 637: Mod: GY | Performed by: NURSE PRACTITIONER

## 2018-08-19 PROCEDURE — 99232 SBSQ HOSP IP/OBS MODERATE 35: CPT | Performed by: NURSE PRACTITIONER

## 2018-08-19 PROCEDURE — 12400011

## 2018-08-19 RX ADMIN — NICOTINE 1 PATCH: 21 PATCH, EXTENDED RELEASE TRANSDERMAL at 08:09

## 2018-08-19 RX ADMIN — CLONIDINE HYDROCHLORIDE 0.1 MG: 0.1 TABLET ORAL at 06:31

## 2018-08-19 RX ADMIN — Medication 250 MG: at 08:10

## 2018-08-19 RX ADMIN — GABAPENTIN 600 MG: 300 CAPSULE ORAL at 20:23

## 2018-08-19 RX ADMIN — MAGNESIUM HYDROXIDE 30 ML: 400 SUSPENSION ORAL at 19:41

## 2018-08-19 RX ADMIN — CELECOXIB 200 MG: 100 CAPSULE ORAL at 20:22

## 2018-08-19 RX ADMIN — Medication 1 G: at 20:23

## 2018-08-19 RX ADMIN — GABAPENTIN 300 MG: 300 CAPSULE ORAL at 11:23

## 2018-08-19 RX ADMIN — CELECOXIB 200 MG: 100 CAPSULE ORAL at 08:09

## 2018-08-19 RX ADMIN — GABAPENTIN 300 MG: 300 CAPSULE ORAL at 06:31

## 2018-08-19 RX ADMIN — NICOTINE POLACRILEX 4 MG: 2 GUM, CHEWING ORAL at 18:08

## 2018-08-19 RX ADMIN — CLONIDINE HYDROCHLORIDE 0.1 MG: 0.1 TABLET ORAL at 11:23

## 2018-08-19 RX ADMIN — LORAZEPAM 1 MG: 1 TABLET ORAL at 18:08

## 2018-08-19 RX ADMIN — AMANTADINE 100 MG: 100 CAPSULE ORAL at 20:22

## 2018-08-19 RX ADMIN — NICOTINE POLACRILEX 4 MG: 2 GUM, CHEWING ORAL at 06:32

## 2018-08-19 RX ADMIN — ACETAMINOPHEN 650 MG: 325 TABLET, FILM COATED ORAL at 18:44

## 2018-08-19 RX ADMIN — CLONIDINE HYDROCHLORIDE 0.1 MG: 0.1 TABLET ORAL at 16:12

## 2018-08-19 RX ADMIN — VITAMIN D, TAB 1000IU (100/BT) 2000 UNITS: 25 TAB at 08:10

## 2018-08-19 RX ADMIN — RANITIDINE 150 MG: 150 TABLET ORAL at 08:10

## 2018-08-19 RX ADMIN — GABAPENTIN 600 MG: 300 CAPSULE ORAL at 16:11

## 2018-08-19 RX ADMIN — VITAMIN A, VITAMIN C, VITAMIN D-3, VITAMIN E, VITAMIN B-1, VITAMIN B-2, NIACIN, VITAMIN B-6, CALCIUM, IRON, ZINC, COPPER 1 TABLET: 4000; 120; 400; 22; 1.84; 3; 20; 10; 1; 12; 200; 27; 25; 2 TABLET ORAL at 08:10

## 2018-08-19 RX ADMIN — LORATADINE 10 MG: 10 TABLET ORAL at 08:10

## 2018-08-19 RX ADMIN — NICOTINE POLACRILEX 4 MG: 2 GUM, CHEWING ORAL at 12:46

## 2018-08-19 RX ADMIN — RANITIDINE 150 MG: 150 TABLET ORAL at 20:23

## 2018-08-19 RX ADMIN — Medication 1 G: at 08:11

## 2018-08-19 RX ADMIN — GABAPENTIN 600 MG: 300 CAPSULE ORAL at 08:10

## 2018-08-19 RX ADMIN — CYANOCOBALAMIN TAB 1000 MCG 1000 MCG: 1000 TAB at 08:10

## 2018-08-19 RX ADMIN — GABAPENTIN 600 MG: 300 CAPSULE ORAL at 12:46

## 2018-08-19 RX ADMIN — HYDROXYZINE PAMOATE 100 MG: 50 CAPSULE ORAL at 16:12

## 2018-08-19 RX ADMIN — Medication 25 MG: at 08:10

## 2018-08-19 RX ADMIN — AMANTADINE 100 MG: 100 CAPSULE ORAL at 08:09

## 2018-08-19 RX ADMIN — Medication 250 MG: at 20:23

## 2018-08-19 RX ADMIN — LAMOTRIGINE 25 MG: 25 TABLET ORAL at 08:10

## 2018-08-19 RX ADMIN — NICOTINE POLACRILEX 4 MG: 2 GUM, CHEWING ORAL at 23:50

## 2018-08-19 ASSESSMENT — ACTIVITIES OF DAILY LIVING (ADL)
GROOMING: INDEPENDENT
ORAL_HYGIENE: INDEPENDENT
DRESS: SCRUBS (BEHAVIORAL HEALTH);INDEPENDENT
GROOMING: INDEPENDENT

## 2018-08-19 NOTE — PLAN OF CARE
Face to face end of shift report received from Brittny MEDRANO. Rounding completed. Patient observed.     Alivia Powell  8/19/2018  7:32 AM

## 2018-08-19 NOTE — PLAN OF CARE
Face to face end of shift report received from Alivia FARLEY RN. Rounding completed. Patient observed in the group room.     Barby Fragoso  8/19/2018  3:54 PM

## 2018-08-19 NOTE — PROGRESS NOTES
[facility]  Psychiatric Progress Note    Subjective   This is a 28 year old female with schizoaffective disorder, bipolar type and polysubstance abuse. This am pt asked to speak w/ writer, she reports frustration over the inability for her to receive her PRN gabapentin as written. Pt is currently taking Gabapentin 600 mg QID with Gabapentin 300 mg BID/PRN available if needed. There was a question as to dosing yesterday and she did not receive her PRN dose yesterday and was given ativan which she did not want. Pharmacy contacted this am for clarification, the order stands as written and there is no problem with how close the PRN dose is to her scheduled dose. I am not going to discontinue ativan as this may be of benefit if needed pre/post chemo treatment. Pt was informed. Delusions although present improve daily.Pt appears hypomanic at times is upbeat. No need to redirect. Denies feelings of depression or anxiety.    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          Medications:       amantadine  100 mg Oral BID     celecoxib  200 mg Oral BID     cholecalciferol  2,000 Units Oral Daily     cyanocobalamin  1,000 mcg Oral Daily     fish oil-omega-3 fatty acids  1 g Oral BID     fish oil-omega-3 fatty acids  1 g Oral BID     gabapentin  600 mg Oral 4x Daily     lamoTRIgine (LaMICtal) tablet 25 mg  25 mg Oral Daily     loratadine  10 mg Oral Daily     nicotine  1 patch Transdermal Daily     nicotine   Transdermal Q8H     nicotine    "Transdermal Daily     PNV PRENATAL PLUS MULTIVITAMIN  1 tablet Oral Daily     pyridOXINE  25 mg Oral Daily     ranitidine  150 mg Oral BID     risperiDONE microspheres  50 mg Intramuscular Q14 Days     saccharomyces boulardii  250 mg Oral BID     acetaminophen, alum & mag hydroxide-simethicone, amantadine, cloNIDine, gabapentin, hydrOXYzine, hydrOXYzine (VISTARIL) capsule  mg, LORazepam, magnesium hydroxide, nicotine polacrilex, risperiDONE          Allergies:     Allergies   Allergen Reactions     Azithromycin Anaphylaxis     Abilify [Aripiprazole] Unknown     Wellbutrin [Bupropion] Other (See Comments)     suicidal     Zithromax [Azithromycin Dihydrate]             Psychiatric Examination:   /79  Pulse 106  Temp 98.1  F (36.7  C) (Tympanic)  Resp 14  Ht 1.651 m (5' 5\")  Wt 98.1 kg (216 lb 4.8 oz)  SpO2 96%  BMI 35.99 kg/m2  Weight is 216 lbs 4.8 oz  Body mass index is 35.99 kg/(m^2).    Appearance:  awake, alert, adequately groomed and dressed in hospital scrubs  Attitude:  cooperative  Eye Contact:  good  Mood:  good  Affect:  mood congruent, intensity is heightened and intensity is dramatic  Speech:  clear, coherent- rapid at times  Psychomotor Behavior:  no evidence of tardive dyskinesia, dystonia, or tics and intact station, gait and muscle tone  Thought Process:  goal oriented  Associations:  no loose associations  Thought Content:  no evidence of suicidal ideation or homicidal ideation  Insight:  fair  Judgment:  fair  Oriented to:  time, person, and place  Attention Span and Concentration:  fair  Recent and Remote Memory:  fair  Fund of Knowledge: appropriate  Muscle Strength and Tone: normal  Gait and Station: Normal  Perception: no perceptual disturbance noted         Labs:   No results found for this or any previous visit (from the past 24 hour(s)).          Assessment/ Plan:    Continue on current medications. Gabapentin order clarified.    "

## 2018-08-19 NOTE — PLAN OF CARE
"Problem: Patient Care Overview  Goal: Individualization & Mutuality  Patient will be compliant with treatment team recommendations.  Patient will report at least 6-8 hours of sleep each night.    Pt has been in bed with eyes closed and regular respirations. 15 minute and PRN checks all night. No complaints offered. Will continue to monitor.    Pt up for the day and to the lounge at 0600    Pt given prn clonidine for Pulse 106 as ordered. Pt requested and was administered prn gapapentin for anxiety related to foot pain. Pt is extremely agitated that she has been denied her prn gabapentin due to pharmacy advising that safe dosing is 3 hours from last dose which does not align with her scheduled doses. Pt has been advised that she needs to speak to the provider in regards to any changes to this as we must follow what the pharmacy tells us is safe. Pt stated \"this is bullshit and times shouldn't matter because she is still under the daily max dose.\"      Brittny David  8/19/2018  6:13 AM        "

## 2018-08-19 NOTE — PLAN OF CARE
"Problem: Patient Care Overview  Goal: Individualization & Mutuality  Patient will be compliant with treatment team recommendations.  Patient will report at least 6-8 hours of sleep each night.    Outcome: No Change  Patient has been in an anxious mood all shift. She endorsed high anxiety - she used Alpha Stim x2 and was given 0.1 mg of PRN Clonidine at 1726 and 1 mg of PRN Ativan at 1908. She continues to have pressured speech. She has been listening to headphones and has been pacing in the hallways. At 2100, patient asked for PRN Gabapentin. Reminded patient about pharmacy recommendation of spacing Gabapentin doses 3 hours apart. Patient became agitated. She was offered PRN Risperdal at this time, but she refused and stated \"Risperdal only makes my paranoia worse.\" 2125 - patient is in the dayroom socializing with peers.     Problem: Thought Process Alteration (Adult)  Goal: Improved Thought Process  Patient will hold reality based conversations prior to discharge.    Outcome: Improving  Patient is able to have a reality based conversation. Speech continues to be pressured.       "

## 2018-08-19 NOTE — PROVIDER NOTIFICATION
08/18/18 2057   Patient Belongings   Did you bring any home meds/supplements to the hospital?  No   Patient Belongings clothing   Disposition of Belongings Locker   Belongings Search Yes   Clothing Search Yes   Second Staff Barby MEDRANO   General Info Comment Lcuy light blue blouse with lace, greta grn/blue/white/black/teal blouse, black leggings, black leopard blouse, love kills hooded tank, [place]oma purplle/white blouse, green/white stripped sweater, black scarf, purple peace tank, black/white sun dress, new direction blouse greenish/white, blue/white bra, multicolored knit scarf, white scarf, black/white bikini bottom, forever 21 black knit sweater, black paisley tank, black studded familia jacket, pink/blue/yellow bikini bottom, green bikini bottom, green/orange cloth purse, black yoga pants, loft white coat    List items sent to safe: none  All other belongings put in assigned cubby in belongings room.     I have reviewed my belongings list on admission and verify that it is correct.     Patient signature_______________________________    Second staff witness (if patient unable to sign) ______________________________       I have received all my belongings at discharge.    Patient signature________________________________    Radha   8/18/2018  9:02 PM

## 2018-08-19 NOTE — PLAN OF CARE
Face to face end of shift report received from Barby Mccauley completed. Patient observed sleeping in supine position with regular and unlabored respirations and audible snoring.     Brittny David  8/18/2018  11:45 PM

## 2018-08-19 NOTE — PLAN OF CARE
"Problem: Patient Care Overview  Goal: Individualization & Mutuality  Patient will be compliant with treatment team recommendations.  Patient will report at least 6-8 hours of sleep each night.    Outcome: No Change  Pt has periods of ups and downs -today pt appears to be somewhat down although denies depression. States frustration with medication regime and Gabapentin. Received a.m dose with all other scheduled medications. Able to vent feelings to staff and interacts with peers. Requested and used alpha stimulator today with observation for 20 minutes, states good benefit from it.     1123- pt came to nurses window stating she \"needed something- anything for anxiety and paranoia\". Stated she \"doesn't feel like she can trust anyone so I might as well take something\". Received Clonidine 0.1 mg and Gabapentin 300 mg po per request.  1150- pt came up to window and stated to another staff member that she \"feels disrespected by us because I can't have a package of wipes in my room instead of in a denture cup like a human being\". Wipes called for from central supply and one package retrieved from [facility] for her. Stated she will bring the package back when she's done.  1210- pt did not return nor use wipes placed in her bathroom- taken and placed in belonging bin. Does appear less anxious. Wearing headphones sitting a table with peers  "

## 2018-08-20 ENCOUNTER — INFUSION THERAPY VISIT (OUTPATIENT)
Dept: INFUSION THERAPY | Facility: OTHER | Age: 28
DRG: 885 | End: 2018-08-20
Attending: STUDENT IN AN ORGANIZED HEALTH CARE EDUCATION/TRAINING PROGRAM
Payer: MEDICARE

## 2018-08-20 VITALS
HEART RATE: 99 BPM | OXYGEN SATURATION: 96 % | DIASTOLIC BLOOD PRESSURE: 77 MMHG | SYSTOLIC BLOOD PRESSURE: 113 MMHG | RESPIRATION RATE: 24 BRPM | HEIGHT: 65 IN | BODY MASS INDEX: 36.58 KG/M2 | WEIGHT: 219.58 LBS | TEMPERATURE: 97.6 F

## 2018-08-20 DIAGNOSIS — Z17.0 MALIGNANT NEOPLASM OF UPPER-OUTER QUADRANT OF LEFT BREAST IN FEMALE, ESTROGEN RECEPTOR POSITIVE (H): Primary | ICD-10-CM

## 2018-08-20 DIAGNOSIS — C50.412 MALIGNANT NEOPLASM OF UPPER-OUTER QUADRANT OF LEFT BREAST IN FEMALE, ESTROGEN RECEPTOR POSITIVE (H): Primary | ICD-10-CM

## 2018-08-20 LAB
ALBUMIN SERPL-MCNC: 3.5 G/DL (ref 3.4–5)
ALP SERPL-CCNC: 100 U/L (ref 40–150)
ALT SERPL W P-5'-P-CCNC: 28 U/L (ref 0–50)
ANION GAP SERPL CALCULATED.3IONS-SCNC: 6 MMOL/L (ref 3–14)
AST SERPL W P-5'-P-CCNC: 13 U/L (ref 0–45)
BASOPHILS # BLD AUTO: 0.1 10E9/L (ref 0–0.2)
BASOPHILS NFR BLD AUTO: 0.6 %
BILIRUB SERPL-MCNC: 0.2 MG/DL (ref 0.2–1.3)
BUN SERPL-MCNC: 12 MG/DL (ref 7–30)
CALCIUM SERPL-MCNC: 8.9 MG/DL (ref 8.5–10.1)
CHLORIDE SERPL-SCNC: 108 MMOL/L (ref 94–109)
CO2 SERPL-SCNC: 26 MMOL/L (ref 20–32)
CREAT SERPL-MCNC: 0.35 MG/DL (ref 0.52–1.04)
DIFFERENTIAL METHOD BLD: ABNORMAL
EOSINOPHIL # BLD AUTO: 0 10E9/L (ref 0–0.7)
EOSINOPHIL NFR BLD AUTO: 0 %
ERYTHROCYTE [DISTWIDTH] IN BLOOD BY AUTOMATED COUNT: 17.5 % (ref 10–15)
GFR SERPL CREATININE-BSD FRML MDRD: >90 ML/MIN/1.7M2
GLUCOSE SERPL-MCNC: 88 MG/DL (ref 70–99)
HCT VFR BLD AUTO: 33.1 % (ref 35–47)
HGB BLD-MCNC: 11.1 G/DL (ref 11.7–15.7)
IMM GRANULOCYTES # BLD: 0.1 10E9/L (ref 0–0.4)
IMM GRANULOCYTES NFR BLD: 0.8 %
LYMPHOCYTES # BLD AUTO: 2.3 10E9/L (ref 0.8–5.3)
LYMPHOCYTES NFR BLD AUTO: 26.9 %
MCH RBC QN AUTO: 27.5 PG (ref 26.5–33)
MCHC RBC AUTO-ENTMCNC: 33.5 G/DL (ref 31.5–36.5)
MCV RBC AUTO: 82 FL (ref 78–100)
MONOCYTES # BLD AUTO: 0.6 10E9/L (ref 0–1.3)
MONOCYTES NFR BLD AUTO: 6.4 %
NEUTROPHILS # BLD AUTO: 5.6 10E9/L (ref 1.6–8.3)
NEUTROPHILS NFR BLD AUTO: 65.3 %
NRBC # BLD AUTO: 0 10*3/UL
NRBC BLD AUTO-RTO: 0 /100
PLATELET # BLD AUTO: 312 10E9/L (ref 150–450)
POTASSIUM SERPL-SCNC: 4.1 MMOL/L (ref 3.4–5.3)
PROT SERPL-MCNC: 7 G/DL (ref 6.8–8.8)
RBC # BLD AUTO: 4.04 10E12/L (ref 3.8–5.2)
SODIUM SERPL-SCNC: 140 MMOL/L (ref 133–144)
WBC # BLD AUTO: 8.6 10E9/L (ref 4–11)

## 2018-08-20 PROCEDURE — 12400011

## 2018-08-20 PROCEDURE — 25000132 ZZH RX MED GY IP 250 OP 250 PS 637: Mod: GY | Performed by: NURSE PRACTITIONER

## 2018-08-20 PROCEDURE — A9270 NON-COVERED ITEM OR SERVICE: HCPCS | Mod: GY | Performed by: NURSE PRACTITIONER

## 2018-08-20 PROCEDURE — 25000128 H RX IP 250 OP 636: Performed by: INTERNAL MEDICINE

## 2018-08-20 PROCEDURE — 80053 COMPREHEN METABOLIC PANEL: CPT | Mod: ZL | Performed by: INTERNAL MEDICINE

## 2018-08-20 PROCEDURE — 40000133 ZZH STATISTIC OT WARD VISIT

## 2018-08-20 PROCEDURE — 96367 TX/PROPH/DG ADDL SEQ IV INF: CPT

## 2018-08-20 PROCEDURE — 96374 THER/PROPH/DIAG INJ IV PUSH: CPT

## 2018-08-20 PROCEDURE — 85025 COMPLETE CBC W/AUTO DIFF WBC: CPT | Mod: ZL | Performed by: INTERNAL MEDICINE

## 2018-08-20 PROCEDURE — 97530 THERAPEUTIC ACTIVITIES: CPT | Mod: GO

## 2018-08-20 PROCEDURE — 96413 CHEMO IV INFUSION 1 HR: CPT

## 2018-08-20 PROCEDURE — 36415 COLL VENOUS BLD VENIPUNCTURE: CPT | Mod: ZL | Performed by: INTERNAL MEDICINE

## 2018-08-20 PROCEDURE — 25000125 ZZHC RX 250: Performed by: INTERNAL MEDICINE

## 2018-08-20 PROCEDURE — 96375 TX/PRO/DX INJ NEW DRUG ADDON: CPT

## 2018-08-20 RX ORDER — HEPARIN SODIUM (PORCINE) LOCK FLUSH IV SOLN 100 UNIT/ML 100 UNIT/ML
5 SOLUTION INTRAVENOUS EVERY 8 HOURS
Status: DISCONTINUED | OUTPATIENT
Start: 2018-08-20 | End: 2018-08-20 | Stop reason: HOSPADM

## 2018-08-20 RX ORDER — HEPARIN SODIUM (PORCINE) LOCK FLUSH IV SOLN 100 UNIT/ML 100 UNIT/ML
5 SOLUTION INTRAVENOUS EVERY 8 HOURS
Status: CANCELLED
Start: 2018-08-20

## 2018-08-20 RX ADMIN — SODIUM CHLORIDE 250 ML: 9 INJECTION, SOLUTION INTRAVENOUS at 12:04

## 2018-08-20 RX ADMIN — LORAZEPAM 1 MG: 1 TABLET ORAL at 18:23

## 2018-08-20 RX ADMIN — CELECOXIB 200 MG: 100 CAPSULE ORAL at 20:03

## 2018-08-20 RX ADMIN — HYDROCORTISONE SODIUM SUCCINATE 100 MG: 100 INJECTION, POWDER, FOR SOLUTION INTRAMUSCULAR; INTRAVENOUS at 12:28

## 2018-08-20 RX ADMIN — NICOTINE POLACRILEX 4 MG: 2 GUM, CHEWING ORAL at 08:39

## 2018-08-20 RX ADMIN — VITAMIN A, VITAMIN C, VITAMIN D-3, VITAMIN E, VITAMIN B-1, VITAMIN B-2, NIACIN, VITAMIN B-6, CALCIUM, IRON, ZINC, COPPER 1 TABLET: 4000; 120; 400; 22; 1.84; 3; 20; 10; 1; 12; 200; 27; 25; 2 TABLET ORAL at 08:22

## 2018-08-20 RX ADMIN — NICOTINE POLACRILEX 2 MG: 2 GUM, CHEWING ORAL at 20:30

## 2018-08-20 RX ADMIN — GABAPENTIN 600 MG: 300 CAPSULE ORAL at 16:15

## 2018-08-20 RX ADMIN — NICOTINE POLACRILEX 2 MG: 2 GUM, CHEWING ORAL at 01:01

## 2018-08-20 RX ADMIN — Medication 1 G: at 08:40

## 2018-08-20 RX ADMIN — GABAPENTIN 300 MG: 300 CAPSULE ORAL at 02:13

## 2018-08-20 RX ADMIN — DIPHENHYDRAMINE HYDROCHLORIDE 50 MG: 50 INJECTION, SOLUTION INTRAMUSCULAR; INTRAVENOUS at 12:30

## 2018-08-20 RX ADMIN — PACLITAXEL 169 MG: 6 INJECTION, SOLUTION INTRAVENOUS at 13:06

## 2018-08-20 RX ADMIN — GABAPENTIN 600 MG: 300 CAPSULE ORAL at 20:04

## 2018-08-20 RX ADMIN — Medication 250 MG: at 20:04

## 2018-08-20 RX ADMIN — Medication 25 MG: at 08:22

## 2018-08-20 RX ADMIN — CLONIDINE HYDROCHLORIDE 0.1 MG: 0.1 TABLET ORAL at 16:17

## 2018-08-20 RX ADMIN — CLONIDINE HYDROCHLORIDE 0.1 MG: 0.1 TABLET ORAL at 02:13

## 2018-08-20 RX ADMIN — RISPERIDONE 1 MG: 1 TABLET, ORALLY DISINTEGRATING ORAL at 20:47

## 2018-08-20 RX ADMIN — MAGNESIUM HYDROXIDE 30 ML: 400 SUSPENSION ORAL at 20:30

## 2018-08-20 RX ADMIN — NICOTINE POLACRILEX 4 MG: 2 GUM, CHEWING ORAL at 20:15

## 2018-08-20 RX ADMIN — GABAPENTIN 300 MG: 300 CAPSULE ORAL at 18:23

## 2018-08-20 RX ADMIN — ONDANSETRON 8 MG: 2 INJECTION INTRAMUSCULAR; INTRAVENOUS at 12:04

## 2018-08-20 RX ADMIN — RANITIDINE 150 MG: 150 TABLET ORAL at 20:03

## 2018-08-20 RX ADMIN — RISPERIDONE 1 MG: 1 TABLET, ORALLY DISINTEGRATING ORAL at 08:39

## 2018-08-20 RX ADMIN — SODIUM CHLORIDE, PRESERVATIVE FREE 5 ML: 5 INJECTION INTRAVENOUS at 14:11

## 2018-08-20 RX ADMIN — LORAZEPAM 1 MG: 1 TABLET ORAL at 08:39

## 2018-08-20 RX ADMIN — GABAPENTIN 600 MG: 300 CAPSULE ORAL at 08:21

## 2018-08-20 RX ADMIN — NICOTINE 1 PATCH: 21 PATCH, EXTENDED RELEASE TRANSDERMAL at 08:20

## 2018-08-20 RX ADMIN — AMANTADINE 100 MG: 100 CAPSULE ORAL at 08:22

## 2018-08-20 RX ADMIN — CELECOXIB 200 MG: 100 CAPSULE ORAL at 08:21

## 2018-08-20 RX ADMIN — FAMOTIDINE 20 MG: 20 INJECTION, SOLUTION INTRAVENOUS at 12:45

## 2018-08-20 RX ADMIN — NICOTINE POLACRILEX 4 MG: 2 GUM, CHEWING ORAL at 18:23

## 2018-08-20 RX ADMIN — VITAMIN D, TAB 1000IU (100/BT) 2000 UNITS: 25 TAB at 08:21

## 2018-08-20 RX ADMIN — AMANTADINE 100 MG: 100 CAPSULE ORAL at 20:04

## 2018-08-20 RX ADMIN — RANITIDINE 150 MG: 150 TABLET ORAL at 08:22

## 2018-08-20 RX ADMIN — CLONIDINE HYDROCHLORIDE 0.1 MG: 0.1 TABLET ORAL at 09:04

## 2018-08-20 RX ADMIN — LAMOTRIGINE 25 MG: 25 TABLET ORAL at 08:22

## 2018-08-20 RX ADMIN — Medication 250 MG: at 08:22

## 2018-08-20 RX ADMIN — CYANOCOBALAMIN TAB 1000 MCG 1000 MCG: 1000 TAB at 08:22

## 2018-08-20 RX ADMIN — Medication 1 G: at 20:03

## 2018-08-20 RX ADMIN — HYDROXYZINE HYDROCHLORIDE 25 MG: 25 TABLET ORAL at 02:13

## 2018-08-20 RX ADMIN — HYDROXYZINE PAMOATE 100 MG: 50 CAPSULE ORAL at 09:04

## 2018-08-20 RX ADMIN — LORATADINE 10 MG: 10 TABLET ORAL at 08:22

## 2018-08-20 ASSESSMENT — ACTIVITIES OF DAILY LIVING (ADL)
GROOMING: INDEPENDENT
LAUNDRY: UNABLE TO COMPLETE
DRESS: INDEPENDENT;SCRUBS (BEHAVIORAL HEALTH)
DRESS: INDEPENDENT
ORAL_HYGIENE: INDEPENDENT
ORAL_HYGIENE: INDEPENDENT
GROOMING: INDEPENDENT

## 2018-08-20 NOTE — PLAN OF CARE
Problem: Patient Care Overview  Goal: Discharge Needs Assessment  Outcome: No Change  Faxed updated notes to [facility] court admin this morning.     Spoke with pt's JORDON Adams this afternoon who states pt's hearing has not switched to ITV tomorrow at 4pm. Pt's  will be driving here and present for the hearing. Angie states she has contacted the director of [facility] regarding referral, but states if she is not accepted to a group home prior to discharge she still has her apartment until the end of September. Angie states she can complete pt's updated [place] when pt returns to the area.     Informed security of pt's hearing switching to ITV tomorrow at 4pm. Spoke with [facility] court admin regarding IP address for tomorrw's hearing- Dannielle states she will contact staff back with IP    Spoke with pt this afternoon and updated her that tomorrow's hearing with be here via ITV.

## 2018-08-20 NOTE — PLAN OF CARE
"Problem: Patient Care Overview  Goal: Individualization & Mutuality  Patient will be compliant with treatment team recommendations.  Patient will report at least 6-8 hours of sleep each night.    Outcome: No Change  Patient was irritable, impatient, edgy, restless, and argumentative until 2025.  She was restless and pacing in the hallway on and off throughout the shift. Speech was pressured at times and thought process was tangential. Mood was anxious - she received PRN Clonidine at 1612 for a pulse of 114, 100 mg of PRN Vistaril at 1612, and 1 mg of PRN Ativan at 1808. She also used Alpha Stim x1, essential oil and music therapy. Patient verbalized that the longer she is here, the more irritable she is becoming. She stated \"I just want to get on with the next step of my life.\" She denied hallucinations. She endorsed paranoid thoughts, but refused to elaborate further. During visiting hours, she endorsed feelings of depression and loneliness - stated \"I wish I had a visitor.\"     Problem: Thought Process Alteration (Adult)  Goal: Improved Thought Process  Patient will hold reality based conversations prior to discharge.    Outcome: Improving  Patient is able to have a reality based conversation - thought process is tangential at times with pressured speech.       "

## 2018-08-20 NOTE — PLAN OF CARE
Problem: Patient Care Overview  Goal: Team Discussion  Team Plan:   BEHAVIORAL TEAM DISCUSSION    Participants:  Haily Tomlinson NP, Renetta Quinteros LSW, Geno Hargrove LSW, Venus Lyons RN, Amelie Cleaning RN, Haily Fuchs OT  Progress: minimal, pressured, tearful, irritable  Continued Stay Criteria/Rationale: chemo, continue to stabilize on medications  Medical/Physical: chemo-active cancer, LE edema  Precautions:   Behavioral Orders   Procedures     Code 1 - Restrict to Unit     Routine Programming     As clinically indicated     Sexual precautions     Status 15     Every 15 minutes.     Plan: DC to [facility] when Chemo is done and bed is available, court tomorrow   Rationale for change in precautions or plan: None

## 2018-08-20 NOTE — PLAN OF CARE
Face to face end of shift report received from Brittny ABAD RN. Rounding completed. Patient observed.     Amelie Cleaning  8/20/2018  7:49 AM

## 2018-08-20 NOTE — PLAN OF CARE
"Problem: Patient Care Overview  Goal: Individualization & Mutuality  Patient will be compliant with treatment team recommendations.  Patient will report at least 6-8 hours of sleep each night.    Outcome: Therapy, progress towards functional goals is fair  Patient was in her room a the start of this shift.  Patient took all scheduled medications but behavior continued to escalate.  Patient demanding to be seen by a medical doctor to \"get the right meds I'm supposed to be on\".  Patient would apologize for her behavior then immediately become irritable.  Patient requested and given Ativan 1 mg po and Risperdal 1 mg po at 0839 for agitation.  Patient continued to pace in the hallway and come to nurses station to complain about not getting the correct medications; refused to inform nurse what medications she was talking about and stated,\"April knows!\" and would walk way.  Patient making threats to \"flip out so I get the meds I want\".  Patient then requested and given Hydroxyzine 100 mg po and Clonidine 0.1 mg po for continued complaints of anxiety/agitation.  Patient was escorted to chemo treatment at 1000 and will remain 1:1 during treatment.    1425:  Patient returned from chemo treatment.    Problem: Thought Process Alteration (Adult)  Goal: Improved Thought Process  Patient will hold reality based conversations prior to discharge.    Outcome: Therapy, progress towards functional goals is fair  Patient is focused on her court hearing and does not want to be put in the back of a squad car to go there.  Patient did admit to some paranoia but could not elaborate further.      "

## 2018-08-20 NOTE — PROGRESS NOTES
Patient is a 28 year old female here accompanied by staff today for infusion of taxol under the orders of Dr. García. Right sided power port accessed by Aurea Stroud, after 2 unsuccessful attempts with 19 gauge 1 inch 90 degree bent non coring needle.  Line flushed with 10 cc's normal saline.  Needle secured with sterile transparent dressing.  10 cc's blood discarded, and blood taken for 2 tubes of ordered labs. Patient tolerated well.  Denies pain and discomfort at this time.  Port flushes easily without resistance.    Hand hygiene performed: yes Mask donned by caregiver: yes Site prepped with CHG: yes Labs drawn: yes  Dressing applied using aseptic technique: yes   Today's lab values: WBC 8.6, ANC 5.6, , HGB 11.1, AST 13, ALT 28, Alkaline Phosphatase 100, Creatinine 0.35.     Paclitaxel 169mg dose verified with Lexi Najera RN prior to release of drug.    Psychosocial well being assessed.  New concerns-none   Patient meets parameters for today's infusion.  Denies questions or concerns regarding today's infusion and/or medications being administered.      Patient identified with two identifiers, order verified, and verbal consent for today's infusion obtained from patient. Written consent for treatment is on file and valid.    1306: IV pump verified with paclitaxel 169mg dose, drug, and rate of administration by second RN, Lexi Najera. Infusion administered per protocol. Patient tolerated infusion well, no signs or symptoms of adverse reaction noted. Patient denies pain nor discomfort.     Needle removed, tip intact. Site clean, dry and intact. Covered with a sterile bandage, slight pressure applied for 30 seconds. Pt instructed to leave bandage intact for a minimum of one hour, and to call with questions or concerns. Copy of appointments, discharge instructions, and after visit summary (AVS) provided to patient. Patient states understanding, discharged accompanied by staff and security. Patient  refused discharge BP/HR before leaving.

## 2018-08-20 NOTE — PLAN OF CARE
Face to face end of shift report received from Amelie SOTOMAYOR RN. Rounding completed. Patient observed in [facility].     Britni Thomas  8/20/2018  4:13 PM

## 2018-08-20 NOTE — PLAN OF CARE
Face to face end of shift report received from Barby Mccauley completed. Patient observed in lounge playing dice with peers. No complaints offered at this time and confirmed george patch in place.     Brittny David  8/20/2018  12:00 AM

## 2018-08-20 NOTE — PLAN OF CARE
Problem: Patient Care Overview  Goal: Individualization & Mutuality  Patient will be compliant with treatment team recommendations.  Patient will report at least 6-8 hours of sleep each night.    Pt up and requesting george gum for the beginning of NOC shift and then commented to UA that she was going to make us all work all night since it doesn't look like we're doing anything. Pt has been in the lounge playing dice with peers and then walking with headphones or writing.     0215- Pt had c/o not being able to calm down and having anxiety that feels really high and uncontrolled. Pt stated on previous day shifts she has been combination of clonidine 0.1, atarax 25, and gabapentin 300 mg and that it worked very well for her to slow her down and calm her anxiety. Pt pulse was measured at 109 and respirations 18. Pt was administered all three as she meets criteria and it was confirmed with charge that she has received all of these together on day shift without any adverse reactions.     Pt did alpha stim with charge and voiced that she feels like she is stressed and has little to no support system, really stressed about her cancer, and she can feel herself being paranoid and recognize that she is delusional but doesn't know how to bring herself back to reality. Sometimes she thinks that her cancer is just a great big money scheme, or that it was caused by an infection due to a previous hospitaliztion.     0400- Pt to bed and asleep now

## 2018-08-20 NOTE — MR AVS SNAPSHOT
After Visit Summary   8/20/2018    Marti Javier    MRN: [id number]           Patient Information     Date Of Birth          1990        Visit Information        Provider Department      8/20/2018 12:00 PM  INF  [address] [facility]        Today's Diagnoses     Malignant neoplasm of upper-outer quadrant of left breast in female, estrogen receptor positive (H)    -  1      Care Instructions      Paclitaxel Solution for injection  What is this medicine?  PACLITAXEL (CIRO li TAX el) is a chemotherapy drug. It targets fast dividing cells, like cancer cells, and causes these cells to die. This medicine is used to treat ovarian cancer, breast cancer, and other cancers.  This medicine may be used for other purposes; ask your health care provider or pharmacist if you have questions.  What should I tell my health care provider before I take this medicine?  They need to know if you have any of these conditions:    blood disorders    irregular heartbeat    infection (especially a virus infection such as chickenpox, cold sores, or herpes)    liver disease    previous or ongoing radiation therapy    an unusual or allergic reaction to paclitaxel, alcohol, polyoxyethylated castor oil, other chemotherapy agents, other medicines, foods, dyes, or preservatives    pregnant or trying to get pregnant    breast-feeding  How should I use this medicine?  This drug is given as an infusion into a vein. It is administered in a hospital or clinic by a specially trained health care professional.  Talk to your pediatrician regarding the use of this medicine in children. Special care may be needed.  Overdosage: If you think you have taken too much of this medicine contact a poison control center or emergency room at once.  NOTE: This medicine is only for you. Do not share this medicine with others.  What if I miss a dose?  It is important not to miss your dose. Call your doctor or health care professional if you  are unable to keep an appointment.  What may interact with this medicine?  Do not take this medicine with any of the following medications:    disulfiram    metronidazole  This medicine may also interact with the following medications:    cyclosporine    diazepam    ketoconazole    medicines to increase blood counts like filgrastim, pegfilgrastim, sargramostim    other chemotherapy drugs like cisplatin, doxorubicin, epirubicin, etoposide, teniposide, vincristine    quinidine    testosterone    vaccines    verapamil  Talk to your doctor or health care professional before taking any of these medicines:    acetaminophen    aspirin    ibuprofen    ketoprofen    naproxen  This list may not describe all possible interactions. Give your health care provider a list of all the medicines, herbs, non-prescription drugs, or dietary supplements you use. Also tell them if you smoke, drink alcohol, or use illegal drugs. Some items may interact with your medicine.  What should I watch for while using this medicine?  Your condition will be monitored carefully while you are receiving this medicine. You will need important blood work done while you are taking this medicine.  This drug may make you feel generally unwell. This is not uncommon, as chemotherapy can affect healthy cells as well as cancer cells. Report any side effects. Continue your course of treatment even though you feel ill unless your doctor tells you to stop.  In some cases, you may be given additional medicines to help with side effects. Follow all directions for their use.  Call your doctor or health care professional for advice if you get a fever, chills or sore throat, or other symptoms of a cold or flu. Do not treat yourself. This drug decreases your body's ability to fight infections. Try to avoid being around people who are sick.  This medicine may increase your risk to bruise or bleed. Call your doctor or health care professional if you notice any unusual  bleeding.  Be careful brushing and flossing your teeth or using a toothpick because you may get an infection or bleed more easily. If you have any dental work done, tell your dentist you are receiving this medicine.  Avoid taking products that contain aspirin, acetaminophen, ibuprofen, naproxen, or ketoprofen unless instructed by your doctor. These medicines may hide a fever.  Do not become pregnant while taking this medicine. Women should inform their doctor if they wish to become pregnant or think they might be pregnant. There is a potential for serious side effects to an unborn child. Talk to your health care professional or pharmacist for more information. Do not breast-feed an infant while taking this medicine.  Men are advised not to father a child while receiving this medicine.  What side effects may I notice from receiving this medicine?  Side effects that you should report to your doctor or health care professional as soon as possible:    allergic reactions like skin rash, itching or hives, swelling of the face, lips, or tongue    low blood counts - This drug may decrease the number of white blood cells, red blood cells and platelets. You may be at increased risk for infections and bleeding.    signs of infection - fever or chills, cough, sore throat, pain or difficulty passing urine    signs of decreased platelets or bleeding - bruising, pinpoint red spots on the skin, black, tarry stools, nosebleeds    signs of decreased red blood cells - unusually weak or tired, fainting spells, lightheadedness    breathing problems    chest pain    high or low blood pressure    mouth sores    nausea and vomiting    pain, swelling, redness or irritation at the injection site    pain, tingling, numbness in the hands or feet    slow or irregular heartbeat    swelling of the ankle, feet, hands  Side effects that usually do not require medical attention (report to your doctor or health care professional if they continue or  "are bothersome):    bone pain    complete hair loss including hair on your head, underarms, pubic hair, eyebrows, and eyelashes    changes in the color of fingernails    diarrhea    loosening of the fingernails    loss of appetite    muscle or joint pain    red flush to skin    sweating  This list may not describe all possible side effects. Call your doctor for medical advice about side effects. You may report side effects to FDA at 2-182-IZS-3841.  Where should I keep my medicine?  This drug is given in a hospital or clinic and will not be stored at home.  NOTE:This sheet is a summary. It may not cover all possible information. If you have questions about this medicine, talk to your doctor, pharmacist, or health care provider. Copyright  2016 Gold Standard                Follow-ups after your visit        Who to contact     If you have questions or need follow up information about today's clinic visit or your schedule please contact [facility] directly at [phone].  Normal or non-critical lab and imaging results will be communicated to you by [employer]aRom[employer]art, letter or phone within 4 business days after the clinic has received the results. If you do not hear from us within 7 days, please contact the clinic through Oktopostt or phone. If you have a critical or abnormal lab result, we will notify you by phone as soon as possible.  Submit refill requests through Cap[employer]ue or call your pharmacy and they will forward the refill request to us. Please allow 3 business days for your refill to be completed.          Additional Information About Your Visit        Capablue Information     Capablue lets you send messages to your doctor, view your test results, renew your prescriptions, schedule appointments and more. To sign up, go to [email or web address] . Click on \"Log in\" on the left side of the screen, which will take you to the Welcome page. Then click on \"Sign up Now\" on the right side of the page.     You will " "be asked to enter the access code listed below, as well as some personal information. Please follow the directions to create your username and password.     Your access code is: [email or web address]  Expires: 2018 10:44 AM     Your access code will  in 90 days. If you need help or a new code, please call your [place] clinic or [phone].        Care EveryWhere ID     This is your Care EveryWhere ID. This could be used by other organizations to access your [place] medical records  [id number]        Your Vitals Were     Pulse Temperature Respirations Height Pulse Oximetry BMI (Body Mass Index)    99 97.6  F (36.4  C) (Oral) 24 1.651 m (5' 5\") 96% 36.54 kg/m2       Blood Pressure from Last 3 Encounters:   18 113/77   18 132/84   18 107/68    Weight from Last 3 Encounters:   18 99.6 kg (219 lb 9.3 oz)   18 98.7 kg (217 lb 9.6 oz)   18 98.7 kg (217 lb 9.6 oz)              We Performed the Following     CBC with platelets diff     Comprehensive metabolic panel          Today's Medication Changes      Notice     This visit is during an admission. Changes to the med list made in this visit will be reflected in the After Visit Summary of the admission.             Primary Care Provider Fax #    Physician No Ref-Primary [phone]       No address on file        Equal Access to Services     SHARI JOHNSON : Hadii marilyn fernandoo Marcos, waaxda luqadaha, qaybta kaalmada chapito, phill herrera. So [facility] [phone].    ATENCIÓN: Si habla español, tiene a downey disposición servicios gratuitos de asistencia lingüística. Llame al [phone].    We comply with applicable federal civil rights laws and [place] laws. We do not discriminate on the basis of race, color, national origin, age, disability, sex, sexual orientation, or gender identity.            Thank you!     Thank you for choosing [facility]  for your care. Our goal is always to " provide you with excellent care. Hearing back from our patients is one way we can continue to improve our services. Please take a few minutes to complete the written survey that you may receive in the mail after your visit with us. Thank you!             Your Updated Medication List - Protect others around you: Learn how to safely use, store and throw away your medicines at www.disposemymeds.org.      Notice     This visit is during an admission. Changes to the med list made in this visit will be reflected in the After Visit Summary of the admission.

## 2018-08-20 NOTE — PROGRESS NOTES
"   08/20/18 1400   Signing Clinician's Name / Credentials   Signing clinician's name / credentials Haily Fuchs OTR/L   Quick Adds   Rehab Discipline OT   Therapeutic Activity   Minutes of Treatment 56 minutes   Symptoms Noted During/After Treatment None   Treatment Detail Pt is pressured, and upset this morning.  Reports ongoing edema in LEs/feet, need for essential oils, and need for medication to manage her anxiety.  Kinesiotaping completed to bilateral ankle/feet for edema management.  Educated in wear of the kinesiotape and RN informed of tape in place.  Pt also issued essential oils for coping and calming. Pt states that she uses the pure essential oil on her skin,  Educated in proper use of oils and need to use diluted oil on her skin due to sensitiviities.  pt states \"Yeah i dont follow that rule.  I know what works for me and this is what I need\".  Pt has been using oils since her stay here and has had no known reaction to the oils thus far.  Pt is dissmissive with staff at times, tearful and demanding about needing oils.      Additional Documentation   OT Plan Continue OT for edema managment, coping skills, and sensory items.     Total Session Time   Total Session Time (minutes) 56 minutes     "

## 2018-08-21 PROCEDURE — 99232 SBSQ HOSP IP/OBS MODERATE 35: CPT | Performed by: NURSE PRACTITIONER

## 2018-08-21 PROCEDURE — 25000128 H RX IP 250 OP 636: Performed by: NURSE PRACTITIONER

## 2018-08-21 PROCEDURE — 25000132 ZZH RX MED GY IP 250 OP 250 PS 637: Mod: GY | Performed by: NURSE PRACTITIONER

## 2018-08-21 PROCEDURE — A9270 NON-COVERED ITEM OR SERVICE: HCPCS | Mod: GY | Performed by: NURSE PRACTITIONER

## 2018-08-21 PROCEDURE — 12400011

## 2018-08-21 RX ORDER — LAMOTRIGINE 25 MG/1
25 TABLET ORAL 2 TIMES DAILY
Status: DISCONTINUED | OUTPATIENT
Start: 2018-08-21 | End: 2018-08-27

## 2018-08-21 RX ADMIN — RANITIDINE 150 MG: 150 TABLET ORAL at 20:49

## 2018-08-21 RX ADMIN — GABAPENTIN 300 MG: 300 CAPSULE ORAL at 19:05

## 2018-08-21 RX ADMIN — HYDROXYZINE PAMOATE 100 MG: 50 CAPSULE ORAL at 11:46

## 2018-08-21 RX ADMIN — CELECOXIB 200 MG: 100 CAPSULE ORAL at 20:49

## 2018-08-21 RX ADMIN — GABAPENTIN 600 MG: 300 CAPSULE ORAL at 17:01

## 2018-08-21 RX ADMIN — LAMOTRIGINE 25 MG: 25 TABLET ORAL at 08:06

## 2018-08-21 RX ADMIN — HYDROXYZINE PAMOATE 100 MG: 50 CAPSULE ORAL at 16:08

## 2018-08-21 RX ADMIN — GABAPENTIN 600 MG: 300 CAPSULE ORAL at 20:49

## 2018-08-21 RX ADMIN — ACETAMINOPHEN 650 MG: 325 TABLET, FILM COATED ORAL at 08:07

## 2018-08-21 RX ADMIN — NICOTINE POLACRILEX 4 MG: 2 GUM, CHEWING ORAL at 11:47

## 2018-08-21 RX ADMIN — NICOTINE POLACRILEX 4 MG: 2 GUM, CHEWING ORAL at 20:49

## 2018-08-21 RX ADMIN — RANITIDINE 150 MG: 150 TABLET ORAL at 08:07

## 2018-08-21 RX ADMIN — NICOTINE POLACRILEX 4 MG: 2 GUM, CHEWING ORAL at 16:08

## 2018-08-21 RX ADMIN — Medication 1 G: at 20:48

## 2018-08-21 RX ADMIN — MAGNESIUM HYDROXIDE 30 ML: 400 SUSPENSION ORAL at 21:26

## 2018-08-21 RX ADMIN — CELECOXIB 200 MG: 100 CAPSULE ORAL at 08:06

## 2018-08-21 RX ADMIN — CLONIDINE HYDROCHLORIDE 0.1 MG: 0.1 TABLET ORAL at 20:48

## 2018-08-21 RX ADMIN — NICOTINE POLACRILEX 4 MG: 2 GUM, CHEWING ORAL at 06:50

## 2018-08-21 RX ADMIN — VITAMIN A, VITAMIN C, VITAMIN D-3, VITAMIN E, VITAMIN B-1, VITAMIN B-2, NIACIN, VITAMIN B-6, CALCIUM, IRON, ZINC, COPPER 1 TABLET: 4000; 120; 400; 22; 1.84; 3; 20; 10; 1; 12; 200; 27; 25; 2 TABLET ORAL at 08:07

## 2018-08-21 RX ADMIN — Medication 250 MG: at 20:49

## 2018-08-21 RX ADMIN — RISPERIDONE 50 MG: KIT at 08:43

## 2018-08-21 RX ADMIN — RISPERIDONE 1 MG: 1 TABLET, ORALLY DISINTEGRATING ORAL at 08:15

## 2018-08-21 RX ADMIN — Medication 1 G: at 08:07

## 2018-08-21 RX ADMIN — CLONIDINE HYDROCHLORIDE 0.1 MG: 0.1 TABLET ORAL at 08:56

## 2018-08-21 RX ADMIN — CYANOCOBALAMIN TAB 1000 MCG 1000 MCG: 1000 TAB at 08:07

## 2018-08-21 RX ADMIN — LORATADINE 10 MG: 10 TABLET ORAL at 08:07

## 2018-08-21 RX ADMIN — AMANTADINE 100 MG: 100 CAPSULE ORAL at 08:07

## 2018-08-21 RX ADMIN — NICOTINE POLACRILEX 4 MG: 2 GUM, CHEWING ORAL at 17:34

## 2018-08-21 RX ADMIN — CLONIDINE HYDROCHLORIDE 0.1 MG: 0.1 TABLET ORAL at 16:08

## 2018-08-21 RX ADMIN — VITAMIN D, TAB 1000IU (100/BT) 2000 UNITS: 25 TAB at 08:07

## 2018-08-21 RX ADMIN — NICOTINE 1 PATCH: 21 PATCH, EXTENDED RELEASE TRANSDERMAL at 08:05

## 2018-08-21 RX ADMIN — RISPERIDONE 1 MG: 1 TABLET, ORALLY DISINTEGRATING ORAL at 18:13

## 2018-08-21 RX ADMIN — AMANTADINE 100 MG: 100 CAPSULE ORAL at 20:49

## 2018-08-21 RX ADMIN — GABAPENTIN 600 MG: 300 CAPSULE ORAL at 11:47

## 2018-08-21 RX ADMIN — ACETAMINOPHEN 650 MG: 325 TABLET, FILM COATED ORAL at 16:08

## 2018-08-21 RX ADMIN — Medication 25 MG: at 08:14

## 2018-08-21 RX ADMIN — GABAPENTIN 600 MG: 300 CAPSULE ORAL at 08:07

## 2018-08-21 RX ADMIN — Medication 250 MG: at 08:07

## 2018-08-21 RX ADMIN — LAMOTRIGINE 25 MG: 25 TABLET ORAL at 20:49

## 2018-08-21 ASSESSMENT — ACTIVITIES OF DAILY LIVING (ADL)
ORAL_HYGIENE: INDEPENDENT
GROOMING: INDEPENDENT
LAUNDRY: UNABLE TO COMPLETE
DRESS: INDEPENDENT;SCRUBS (BEHAVIORAL HEALTH)
DRESS: INDEPENDENT
ORAL_HYGIENE: INDEPENDENT
GROOMING: INDEPENDENT

## 2018-08-21 NOTE — PLAN OF CARE
"Problem: Patient Care Overview  Goal: Individualization & Mutuality  Patient will be compliant with treatment team recommendations.  Patient will report at least 6-8 hours of sleep each night.    8/21/18 Patient may keep wet wipes in her bathroom per patient care order for proper hygiene after chemo treatments.  Patient will attend groups.   Pt is irritable this shift.  She states she is working on her anger issues and that is her goal for the day.  Pt states her anxiety is hui high related to her court hearing. Denies SI, HI, and hallucinations. C/o pain in both feet rating her pain 7/10. Pt requested PRNs for both pain and anxiety.   Pt tried PRN alpha stim and essential oils.  Writer administered PRN tyl 650mg, PRN atarax 100mg, and PRN clonidine 0.1mg at 1608.  Pt showered this shift. 1805 pt asked to use treadmill, staff told pt that should could use the treadmill during group time.  Pt became argumentative and had a hard time taking redirection.  Shortly after pt apologized and took PRN risperidall for anxiety at 1813.  Pt continued to be irritable throughout the night.  Pt laid on hallway floor.  Staff had to redirect pt to get up.  Pt was given PRN gabapentin at 1903.  2030-  BP (!) 172/114  Pulse 116  Temp 98  F (36.7  C) (Tympanic)  Resp 16  Ht 1.651 m (5' 5\")  Wt 99.6 kg (219 lb 8 oz)  SpO2 98%  BMI 36.53 kg/m2.  On call provider Toya Wolf was called and notified.  NP stated to give PRN clonidine 0.1mg with bedtime medications and countine to monitor pulse.  Writer administered PRN clonidine at 2048.  Pt pulse recheck at 2155 was 109.     Problem: Thought Process Alteration (Adult)  Goal: Identify Related Risk Factors and Signs and Symptoms  Patient will be compliant with medications and treatment team recommendations while on unit.    Pt is compliant with treatment team recommendations.  Goal: Improved Thought Process  Patient will hold reality based conversations prior to discharge.    Pt is " able to have reality based conversations.

## 2018-08-21 NOTE — PROGRESS NOTES
08/20/18 1944   Patient Belongings   Did you bring any home meds/supplements to the hospital?  No   Disposition of Belongings Locker   Belongings Search Yes   Clothing Search Yes   Second Staff Celi RN   General Info Comment green bag, lip gloss, eye shadow, lipstick, eyelash stuff.    List items sent to safe: NA  All other belongings put in assigned cubby in belongings room.     I have reviewed my belongings list on admission and verify that it is correct.     Patient signature_______________________________    Second staff witness (if patient unable to sign) ______________________________       I have received all my belongings at discharge.    Patient signature________________________________    Misael   8/20/2018  7:45 PM

## 2018-08-21 NOTE — PLAN OF CARE
Problem: Patient Care Overview  Goal: Team Discussion  Team Plan:   BEHAVIORAL TEAM DISCUSSION    Participants: Jenni Varma NP, Haily Tomlinson NP, Moon Elmore NP, Doctor Parviz, Renetta Quinteros LSW, Geno Hargrove LSW, Edie Eugene RN, Venus Lyons RN, Amelie Cleaning RN, Haily Fuchs OT  Progress: minimal, irritable, labile  Continued Stay Criteria/Rationale: chemo treatments, continue to stabilize, increase Lamictal after 14 days  Medical/Physical: active breast cancer - chemo  Precautions:   Behavioral Orders   Procedures     Code 1 - Restrict to Unit     Routine Programming     As clinically indicated     Sexual precautions     Status 15     Every 15 minutes.     Plan: finish chemo, DC to foster care vs apartment  Rationale for change in precautions or plan: None

## 2018-08-21 NOTE — PLAN OF CARE
Face to face end of shift report received from Britni Mccauley completed. Patient observed sleeping in left side lying position with regular and unlabored respirations.     Brittny David  8/21/2018  12:03 AM

## 2018-08-21 NOTE — PLAN OF CARE
Face to face end of shift report received from Brittny ABAD RN. Rounding completed. Patient observed.     Amelie Cleaning  8/21/2018  7:48 AM

## 2018-08-21 NOTE — PLAN OF CARE
Problem: Patient Care Overview  Goal: Individualization & Mutuality  Patient will be compliant with treatment team recommendations.  Patient will report at least 6-8 hours of sleep each night.    Pt has been in bed with eyes closed and regular respirations. 15 minute and PRN checks all night. No complaints offered. Will continue to monitor.    0315-Pt up and to the lounge for snack  0345- Manual BP taken on right forearm due to pt c/o not wanting monitor used. Manual BP read on right forearm was 128/90. Pt voiced that she does not like and has asked that her BP not be taken on her upper arm due to discomfort. Writer has also passed along in report that BP should not be taken on pt upper arms due to bilateral mastectomy and removal of lymph nodes since it is contraindicated. Pt stated that she has told staff this and they continue to use the monitor and on her right upper arm which she feels is the cause of some fluid build up and swelling around her port. Pt complained of right sided swelling where port is located.     0445- Pt back asleep    0640-Pt back up and to the lounge.         Brittny David  8/21/2018  1:24 AM

## 2018-08-21 NOTE — PLAN OF CARE
Problem: Patient Care Overview  Goal: Individualization & Mutuality  Patient will be compliant with treatment team recommendations.  Patient will report at least 6-8 hours of sleep each night.    Outcome: Therapy, progress towards functional goals is fair  Patient has been up on the unit this shift.  Patient started the morning positively and admitting that her mood is still unstable at times.  Patient became angry when staff had removed a contraband item from her room.  Patient was at the nurses station yelling and accusing staff of taking her items on purpose and was hard to redirect.  Patient had requested and been given Risperdal 1 mg po with her scheduled AM medications but then requested and was given Clonidine 0.1 mg po for anxiety/agitation at.  Patient also requested Alpha Stim and nurse sat with her during treatment.  Patient vented about the things being removed from her room and calmed.  Patient apologetic for her behavior and apologized to the staff who had removed her items.  Patient requested and allowed to dress in street clothes for her [place] court hearing.  Patient stated that she feels that the PRN Ativan is not beneficial for her and states concerns about her addiction history.  Patient is able to make her needs known to staff.    1035:  Patient's  here to see patient.  Patient dressed in street clothes for the hearing.    1147:  Patient returned from court hearing; agitated/anxious and asking for medications.  Hydroxyzine 100 mg po given and patient changed back into scrubs.    1235:  Patient went to take a nap to help calm self, given extra blanket and earplugs.    Problem: Thought Process Alteration (Adult)  Goal: Improved Thought Process  Patient will hold reality based conversations prior to discharge.    Outcome: Therapy, progress towards functional goals is fair  Patient is able to have a reality based conversation but will focus on something that makes her angry and has a hard time  letting go of the subject and the anger but will eventuallly work through the anger.

## 2018-08-21 NOTE — PLAN OF CARE
Face to face end of shift report received from Amelie SOTOMAYOR RN. Rounding completed. Patient observed in bed, appears to be sleeping.  Did not further distrub.     Britni Thomas  8/21/2018  3:42 PM

## 2018-08-21 NOTE — PLAN OF CARE
"Problem: Patient Care Overview  Goal: Individualization & Mutuality  Patient will be compliant with treatment team recommendations.  Patient will report at least 6-8 hours of sleep each night.    Pt is irritable and dismissive this shift.  She states that she is very anger and is having a hard time dealing with her anger.  Did discuss coping skills with pt.  Pt admits to high anxiety and some depression.  Pt denies SI, HI, pain, and hallucinations.  Pt came up to window and stated\" I need my PRN before I lose my shit.\"  Writer administered PRN clonidine 0.1mg at 1617.  Pt then went to phone and began yelling at someone on the other line. Pt did take redirection and later apologized.  Pt making multiple requests throughout the night and very demanding. Pt requested PRN ativan 1mg for anxiety at 1823.  Pt later reports that medications only take off the edge.  Pt later came up to nurses station and was upset that she could not reach her sister.  She requested to use the phone inside the nurses station.  Staff told her no and she began yelling at the staff.  Pt soon apologized and writer gave PRN Risperdal 1mg at 2047. Pt cooperative with all medications this shift.  Pt did not attend groups this shift.        Problem: Thought Process Alteration (Adult)  Goal: Identify Related Risk Factors and Signs and Symptoms  Patient will be compliant with medications and treatment team recommendations while on unit.    Pt is compliant with treatment team recommendations.    Goal: Improved Thought Process  Patient will hold reality based conversations prior to discharge.    Pt has reality based conversations this shift.       "

## 2018-08-22 PROCEDURE — 25000132 ZZH RX MED GY IP 250 OP 250 PS 637: Mod: GY | Performed by: NURSE PRACTITIONER

## 2018-08-22 PROCEDURE — 12400011

## 2018-08-22 PROCEDURE — A9270 NON-COVERED ITEM OR SERVICE: HCPCS | Mod: GY | Performed by: NURSE PRACTITIONER

## 2018-08-22 PROCEDURE — 99232 SBSQ HOSP IP/OBS MODERATE 35: CPT | Performed by: NURSE PRACTITIONER

## 2018-08-22 PROCEDURE — 97530 THERAPEUTIC ACTIVITIES: CPT | Mod: GO

## 2018-08-22 PROCEDURE — 40000133 ZZH STATISTIC OT WARD VISIT

## 2018-08-22 RX ADMIN — GABAPENTIN 600 MG: 300 CAPSULE ORAL at 17:29

## 2018-08-22 RX ADMIN — Medication 1 G: at 20:17

## 2018-08-22 RX ADMIN — GABAPENTIN 600 MG: 300 CAPSULE ORAL at 08:21

## 2018-08-22 RX ADMIN — LAMOTRIGINE 25 MG: 25 TABLET ORAL at 20:17

## 2018-08-22 RX ADMIN — RANITIDINE 150 MG: 150 TABLET ORAL at 08:22

## 2018-08-22 RX ADMIN — RANITIDINE 150 MG: 150 TABLET ORAL at 20:18

## 2018-08-22 RX ADMIN — GABAPENTIN 600 MG: 300 CAPSULE ORAL at 20:17

## 2018-08-22 RX ADMIN — CELECOXIB 200 MG: 100 CAPSULE ORAL at 20:17

## 2018-08-22 RX ADMIN — LAMOTRIGINE 25 MG: 25 TABLET ORAL at 08:21

## 2018-08-22 RX ADMIN — NICOTINE POLACRILEX 4 MG: 2 GUM, CHEWING ORAL at 20:18

## 2018-08-22 RX ADMIN — Medication 250 MG: at 08:23

## 2018-08-22 RX ADMIN — VITAMIN D, TAB 1000IU (100/BT) 2000 UNITS: 25 TAB at 08:22

## 2018-08-22 RX ADMIN — LORATADINE 10 MG: 10 TABLET ORAL at 08:23

## 2018-08-22 RX ADMIN — NICOTINE POLACRILEX 4 MG: 2 GUM, CHEWING ORAL at 08:23

## 2018-08-22 RX ADMIN — Medication 1 G: at 08:21

## 2018-08-22 RX ADMIN — AMANTADINE 100 MG: 100 CAPSULE ORAL at 08:22

## 2018-08-22 RX ADMIN — NICOTINE POLACRILEX 4 MG: 2 GUM, CHEWING ORAL at 06:51

## 2018-08-22 RX ADMIN — Medication 25 MG: at 08:22

## 2018-08-22 RX ADMIN — NICOTINE 1 PATCH: 21 PATCH, EXTENDED RELEASE TRANSDERMAL at 08:20

## 2018-08-22 RX ADMIN — AMANTADINE 100 MG: 100 CAPSULE ORAL at 20:18

## 2018-08-22 RX ADMIN — CYANOCOBALAMIN TAB 1000 MCG 1000 MCG: 1000 TAB at 08:22

## 2018-08-22 RX ADMIN — Medication 250 MG: at 20:17

## 2018-08-22 RX ADMIN — RISPERIDONE 1 MG: 1 TABLET, ORALLY DISINTEGRATING ORAL at 08:23

## 2018-08-22 RX ADMIN — GABAPENTIN 600 MG: 300 CAPSULE ORAL at 12:18

## 2018-08-22 RX ADMIN — CELECOXIB 200 MG: 100 CAPSULE ORAL at 08:21

## 2018-08-22 RX ADMIN — CLONIDINE HYDROCHLORIDE 0.1 MG: 0.1 TABLET ORAL at 19:19

## 2018-08-22 RX ADMIN — NICOTINE POLACRILEX 4 MG: 2 GUM, CHEWING ORAL at 12:20

## 2018-08-22 RX ADMIN — RISPERIDONE 1 MG: 1 TABLET, ORALLY DISINTEGRATING ORAL at 19:19

## 2018-08-22 RX ADMIN — NICOTINE POLACRILEX 4 MG: 2 GUM, CHEWING ORAL at 15:59

## 2018-08-22 RX ADMIN — VITAMIN A, VITAMIN C, VITAMIN D-3, VITAMIN E, VITAMIN B-1, VITAMIN B-2, NIACIN, VITAMIN B-6, CALCIUM, IRON, ZINC, COPPER 1 TABLET: 4000; 120; 400; 22; 1.84; 3; 20; 10; 1; 12; 200; 27; 25; 2 TABLET ORAL at 08:23

## 2018-08-22 ASSESSMENT — ACTIVITIES OF DAILY LIVING (ADL)
ORAL_HYGIENE: INDEPENDENT
ORAL_HYGIENE: INDEPENDENT
DRESS: SCRUBS (BEHAVIORAL HEALTH)
GROOMING: INDEPENDENT
GROOMING: INDEPENDENT
DRESS: INDEPENDENT

## 2018-08-22 NOTE — PLAN OF CARE
Problem: Patient Care Overview  Goal: Individualization & Mutuality  Patient will be compliant with treatment team recommendations.  Patient will report at least 6-8 hours of sleep each night.    8/21/18 Patient may keep wet wipes in her bathroom per patient care order for proper hygiene after chemo treatments.  Patient will attend groups.   Outcome: Therapy, progress towards functional goals is fair  Patient was up walking in the hallway at the start of this shift.  Patient is less tense than yesterday but mood remains unpredictable and patient aware that her mood is unstable at times.  Patient requested and given Risperdal 1 mg po with scheduled AM medications for complaints of agitation/anxiety.  Patient has used Alpha Stim to help decrease anxiety.  Patient spent time with OT.  Patient is easily able to make her needs known to staff; denies unwanted side effects.    Problem: Thought Process Alteration (Adult)  Goal: Improved Thought Process  Patient will hold reality based conversations prior to discharge.    Outcome: Therapy, progress towards functional goals is fair  Patient is able to track fairly well during conversation but does remain dis tractable.  Patient is able to make her needs known and is less focused on negative thoughts.

## 2018-08-22 NOTE — PLAN OF CARE
Problem: Patient Care Overview  Goal: Team Discussion  Team Plan:   BEHAVIORAL TEAM DISCUSSION    Participants:  Haily Tomlinson NP, Michael Aranda NP, Toya Wolf NP, Renetta Quinteros LSW,  Geno Hargrove LSW, Amelie Cleaning RN, Haily Fuchs OT  Progress: minimal, irritable, labile  Continued Stay Criteria/Rationale: continue to stabilize on medications  Medical/Physical: active breast cancer - chemo  Precautions:   Behavioral Orders   Procedures     Code 1 - Restrict to Unit     Routine Programming     As clinically indicated     Status 15     Every 15 minutes.     Plan: chemo on Monday, court yesterday,  to consider commitment extension until Friday, continue to stabilize  Rationale for change in precautions or plan: None

## 2018-08-22 NOTE — PLAN OF CARE
Face to face end of shift report received from Brittny ABAD RN. Rounding completed. Patient observed.     Amelie Cleaning  8/22/2018  7:53 AM

## 2018-08-22 NOTE — PLAN OF CARE
Face to face end of shift report received from Britni Mccauley completed. [place] [facility] [place] with no complaints offered.     Brittny David  8/21/2018  11:45 PM

## 2018-08-22 NOTE — PLAN OF CARE
"Problem: Patient Care Overview  Goal: Individualization & Mutuality  Patient will be compliant with treatment team recommendations.  Patient will report at least 6-8 hours of sleep each night.    8/21/18 Patient may keep wet wipes in her bathroom per patient care order for proper hygiene after chemo treatments.  Patient will attend groups.   Outcome: No Change  Patient has been out and about in lounge area. Did attend some groups earlier today but none so far this evening. Did use Alpha stim and stated it\" helped a little.\"States she is tired and tried to rest but brother came to visit. Denies any hallucinations. Denies any suicidal thoughts. Speech slightly pressured. Room is very disorganized and messy. Encouraged to tidy it up a bit.    Problem: Thought Process Alteration (Adult)  Goal: Identify Related Risk Factors and Signs and Symptoms  Patient will be compliant with medications and treatment team recommendations while on unit.    Outcome: Improving  Has been compliant with medications.  Goal: Improved Thought Process  Patient will hold reality based conversations prior to discharge.    Outcome: Improving  Patient is able to have reality based conversation.      "

## 2018-08-22 NOTE — PLAN OF CARE
Problem: Patient Care Overview  Goal: Individualization & Mutuality  Patient will be compliant with treatment team recommendations.  Patient will report at least 6-8 hours of sleep each night.    8/21/18 Patient may keep wet wipes in her bathroom per patient care order for proper hygiene after chemo treatments.  Patient will attend groups.   Pt up with peers watching movie and then asleep at approx 0115.   Pt has been in bed with eyes closed and regular respirations. 15 minute and PRN checks all night. No complaints offered. Will continue to monitor.      Brittny David  8/22/2018  5:20 AM

## 2018-08-22 NOTE — PLAN OF CARE
Face to face end of shift report received from Amelie MEDRANO. Rounding completed. Patient observed.     Laura Barker  8/22/2018  6:37 PM

## 2018-08-22 NOTE — PROGRESS NOTES
"[facility]  Psychiatric Progress Note    Subjective   This is a 28 year old female with schizoaffective disorder, bipolar type and polysubstance abuse.    Her moods remain a bit labile. This morniing she had court. She was quite irritable before hand and anxious as I did observe her from a distance though was not able to meet with her until later in evening. In the evening she was quite calm. She states she is not angry upset or agitated regarding court though \"it just sucks that im stuck here to finish chemo but at least I dont mind it too much\". Her thoughts on this change as her mood changes. She is much less preoccupied than she was last week. Is calmer. She does state \"i really want lamictal increased. I was on 200 mg when I came in but you guys started it over because I didn't know how many days I missed\". She states \"im typically quite happy. im really bitchy right now and I know that I wasn't as bitchy when I took that\". She also requests vyvanse restarted though I said that at this time, she needs to have mood stabilized for longer period of time prior to consideration and did not believe that time would occur when she is inpatient here and that her outpatient prescribe could look into that again if he felt it were warranted. Does have dx of adhd as child per her report.     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          Medications:       amantadine  100 mg Oral BID     celecoxib  200 mg Oral BID     cholecalciferol  " "2,000 Units Oral Daily     cyanocobalamin  1,000 mcg Oral Daily     fish oil-omega-3 fatty acids  1 g Oral BID     gabapentin  600 mg Oral 4x Daily     lamoTRIgine (LaMICtal) tablet 25 mg  25 mg Oral BID     loratadine  10 mg Oral Daily     nicotine  1 patch Transdermal Daily     nicotine   Transdermal Q8H     nicotine   Transdermal Daily     PNV PRENATAL PLUS MULTIVITAMIN  1 tablet Oral Daily     pyridOXINE  25 mg Oral Daily     ranitidine  150 mg Oral BID     risperiDONE microspheres  50 mg Intramuscular Q14 Days     saccharomyces boulardii  250 mg Oral BID     acetaminophen, alum & mag hydroxide-simethicone, amantadine, cloNIDine, gabapentin, hydrOXYzine, hydrOXYzine (VISTARIL) capsule  mg, LORazepam, magnesium hydroxide, nicotine polacrilex, risperiDONE          Allergies:     Allergies   Allergen Reactions     Azithromycin Anaphylaxis     Abilify [Aripiprazole] Unknown     Wellbutrin [Bupropion] Other (See Comments)     suicidal     Zithromax [Azithromycin Dihydrate]             Psychiatric Examination:   /97 (BP Location: Right arm)  Pulse 116  Temp 98  F (36.7  C) (Tympanic)  Resp 16  Ht 1.651 m (5' 5\")  Wt 99.6 kg (219 lb 8 oz)  SpO2 98%  BMI 36.53 kg/m2  Weight is 219 lbs 8 oz  Body mass index is 36.53 kg/(m^2).    Appearance:  awake, alert, adequately groomed and dressed in hospital scrubs  Attitude:  cooperative  Eye Contact:  good  Mood:  good  Affect:  mood congruent, intensity is heightened and intensity is dramatic  Speech:  clear, coherent- rapid at times  Psychomotor Behavior:  no evidence of tardive dyskinesia, dystonia, or tics and intact station, gait and muscle tone  Thought Process:  goal oriented  Associations:  no loose associations  Thought Content:  no evidence of suicidal ideation or homicidal ideation  Insight:  fair  Judgment:  fair  Oriented to:  time, person, and place  Attention Span and Concentration:  fair  Recent and Remote Memory:  fair  Fund of Knowledge: " "appropriate  Muscle Strength and Tone: normal  Gait and Station: Normal  Perception: no perceptual disturbance noted         Labs:   No results found for this or any previous visit (from the past 24 hour(s)).          Assessment/ Plan:    Continue on current medications.   lamictal increased today. Was 3 days early for increase (by book should be done q 14 days to decrease risk of SJS) though she was on lamictal at dose of 200 mg prior to admission and was off for possibly \"up to a week\" and was started over on initial starting dose.   She is aware of risk of sjs and I do believe at this time she has capacity to report if any rash occurs. She does understand seriousness If  SJS occurred  "

## 2018-08-22 NOTE — PROGRESS NOTES
"[facility]  Psychiatric Progress Note    Subjective   This is a 28 year old female with schizoaffective disorder, bipolar type and polysubstance abuse.    Patient continues to have high blood pressure at times.  Interviewed today and stated she acknowledges anxiety related to having cancer and treatments. Reports poor sleep last night \"whatever med they gave me kept me up\".   Review of MAR notes a number of bedtime medication but patient cannot recall which one caused insomnia.  Reports she is not angry or upset in past two days. Asks about medication advertised on television, called [facility] and reviewed with her. Will continue on Risperdal as has observed efficacy. Asks this provider to increase her Lamictal and reviewed it was increased yesterday and how dose is titrated to 200 mg. Would consider increase in one week if still hospitalized. Appetite is good.  Denies side effects from medications.     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          Medications:       amantadine  100 mg Oral BID     celecoxib  200 mg Oral BID     cholecalciferol  2,000 Units Oral Daily     cyanocobalamin  1,000 mcg Oral Daily     fish oil-omega-3 fatty acids  1 g Oral BID     gabapentin  600 mg Oral 4x Daily     lamoTRIgine (LaMICtal) tablet 25 mg  25 mg Oral BID     loratadine  10 mg Oral Daily     nicotine  1 patch Transdermal Daily     nicotine   Transdermal Q8H     nicotine   Transdermal Daily     PNV PRENATAL PLUS MULTIVITAMIN  1 tablet Oral Daily     " "pyridOXINE  25 mg Oral Daily     ranitidine  150 mg Oral BID     risperiDONE microspheres  50 mg Intramuscular Q14 Days     saccharomyces boulardii  250 mg Oral BID     acetaminophen, alum & mag hydroxide-simethicone, amantadine, cloNIDine, gabapentin, hydrOXYzine, hydrOXYzine (VISTARIL) capsule  mg, LORazepam, magnesium hydroxide, nicotine polacrilex, risperiDONE          Allergies:     Allergies   Allergen Reactions     Azithromycin Anaphylaxis     Abilify [Aripiprazole] Unknown     Wellbutrin [Bupropion] Other (See Comments)     suicidal     Zithromax [Azithromycin Dihydrate]           Psychiatric Examination:   /86  Pulse 104  Temp 97.7  F (36.5  C) (Tympanic)  Resp 14  Ht 1.651 m (5' 5\")  Wt 99.6 kg (219 lb 8 oz)  SpO2 97%  BMI 36.53 kg/m2  Weight is 219 lbs 8 oz  Body mass index is 36.53 kg/(m^2).    Appearance:  awake, alert, adequately groomed and dressed in hospital scrubs  Attitude:  cooperative  Eye Contact:  good  Mood:  good  Affect:  mood congruent, intensity is heightened and intensity is dramatic  Speech:  clear, coherent- rapid at times  Psychomotor Behavior:  no evidence of tardive dyskinesia, dystonia, or tics and intact station, gait and muscle tone  Thought Process:  goal oriented  Associations:  no loose associations  Thought Content:  no evidence of suicidal ideation or homicidal ideation  Insight:  fair  Judgment:  fair  Oriented to:  time, person, and place  Attention Span and Concentration:  fair  Recent and Remote Memory:  fair  Fund of Knowledge: appropriate  Muscle Strength and Tone: normal  Gait and Station: Normal  Perception: no perceptual disturbance noted         Labs:   No results found for this or any previous visit (from the past 24 hour(s)).          Assessment/ Plan:   Continue Inpatient Hospitalization  Continue to provide a safe environment and therapeutic milieu.  Continue medications  Lamictal increased 8/21/18.     ELOS:  >5 days due to mood stability " and completion of cancer infusions on 8/27/18

## 2018-08-22 NOTE — PROGRESS NOTES
MIGUEL A ALBERTO  Patient requested visit by the .  This is the third or fourth visit since being admitted. Patient was very friendly and glad to visit. Patient was concerned about whether she should begin the doctor recommended radiation treatments for her cancer following the chemo-therapy which will end on Monday next.  We discussed this at some length and agreed that she probably needed to follow the doctors recommendation. Discussion continued about her loneliness and having few friends.  Patient was very glad for the visit and this  will do a follow up on Friday.

## 2018-08-22 NOTE — PROGRESS NOTES
08/22/18 1000   Signing Clinician's Name / Credentials   Signing clinician's name / credentials Marilyn Irvin OTR/L   Quick Adds   Rehab Discipline OT   Therapeutic Activity   Minutes of Treatment 55 minutes   Symptoms Noted During/After Treatment None   Treatment Detail Pt. pressured, tearful at times, and then moments of linear conversation. Notes that she is  similiar to a  or shaman but without the paper. Reports that her (R) chest is tighter and more swollen today. Reports that her ankle has been sore. Feels that the tape helped with the swelling and then reports she feels that she thinks that it is worse. Completed healing touch chakra connection and tolerated well. Completed pain drain to (L) shoulder. Patient voiced her concerns and her anxiety/stressors. K-tape applied to (B) feet for edema reduction.    Additional Documentation   OT Plan Continue OT for edema managment, coping skills, and sensory items.     Total Session Time   Total Session Time (minutes) 55 minutes

## 2018-08-23 PROCEDURE — A9270 NON-COVERED ITEM OR SERVICE: HCPCS | Mod: GY | Performed by: NURSE PRACTITIONER

## 2018-08-23 PROCEDURE — 25000132 ZZH RX MED GY IP 250 OP 250 PS 637: Mod: GY | Performed by: NURSE PRACTITIONER

## 2018-08-23 PROCEDURE — 99232 SBSQ HOSP IP/OBS MODERATE 35: CPT | Performed by: NURSE PRACTITIONER

## 2018-08-23 PROCEDURE — 12400011

## 2018-08-23 PROCEDURE — 97530 THERAPEUTIC ACTIVITIES: CPT | Mod: GO

## 2018-08-23 PROCEDURE — 40000133 ZZH STATISTIC OT WARD VISIT

## 2018-08-23 RX ORDER — CLONIDINE HYDROCHLORIDE 0.1 MG/1
0.1 TABLET ORAL 3 TIMES DAILY
Status: DISCONTINUED | OUTPATIENT
Start: 2018-08-23 | End: 2018-09-12 | Stop reason: HOSPADM

## 2018-08-23 RX ADMIN — LAMOTRIGINE 25 MG: 25 TABLET ORAL at 20:33

## 2018-08-23 RX ADMIN — NICOTINE POLACRILEX 4 MG: 2 GUM, CHEWING ORAL at 13:18

## 2018-08-23 RX ADMIN — Medication 1 G: at 08:32

## 2018-08-23 RX ADMIN — NICOTINE POLACRILEX 4 MG: 2 GUM, CHEWING ORAL at 08:36

## 2018-08-23 RX ADMIN — AMANTADINE 100 MG: 100 CAPSULE ORAL at 08:32

## 2018-08-23 RX ADMIN — LAMOTRIGINE 25 MG: 25 TABLET ORAL at 08:33

## 2018-08-23 RX ADMIN — VITAMIN D, TAB 1000IU (100/BT) 2000 UNITS: 25 TAB at 08:32

## 2018-08-23 RX ADMIN — GABAPENTIN 300 MG: 300 CAPSULE ORAL at 11:29

## 2018-08-23 RX ADMIN — GABAPENTIN 600 MG: 300 CAPSULE ORAL at 20:32

## 2018-08-23 RX ADMIN — CLONIDINE HYDROCHLORIDE 0.1 MG: 0.1 TABLET ORAL at 20:33

## 2018-08-23 RX ADMIN — Medication 25 MG: at 08:33

## 2018-08-23 RX ADMIN — GABAPENTIN 600 MG: 300 CAPSULE ORAL at 08:33

## 2018-08-23 RX ADMIN — AMANTADINE 100 MG: 100 CAPSULE ORAL at 20:33

## 2018-08-23 RX ADMIN — CLONIDINE HYDROCHLORIDE 0.1 MG: 0.1 TABLET ORAL at 13:17

## 2018-08-23 RX ADMIN — Medication 250 MG: at 08:33

## 2018-08-23 RX ADMIN — LORATADINE 10 MG: 10 TABLET ORAL at 08:38

## 2018-08-23 RX ADMIN — CELECOXIB 200 MG: 100 CAPSULE ORAL at 08:32

## 2018-08-23 RX ADMIN — RANITIDINE 150 MG: 150 TABLET ORAL at 20:32

## 2018-08-23 RX ADMIN — ACETAMINOPHEN 650 MG: 325 TABLET, FILM COATED ORAL at 17:42

## 2018-08-23 RX ADMIN — Medication 1 G: at 20:33

## 2018-08-23 RX ADMIN — NICOTINE POLACRILEX 4 MG: 2 GUM, CHEWING ORAL at 18:06

## 2018-08-23 RX ADMIN — NICOTINE POLACRILEX 4 MG: 2 GUM, CHEWING ORAL at 07:34

## 2018-08-23 RX ADMIN — NICOTINE 1 PATCH: 21 PATCH, EXTENDED RELEASE TRANSDERMAL at 08:37

## 2018-08-23 RX ADMIN — CYANOCOBALAMIN TAB 1000 MCG 1000 MCG: 1000 TAB at 08:32

## 2018-08-23 RX ADMIN — Medication 250 MG: at 20:32

## 2018-08-23 RX ADMIN — ACETAMINOPHEN 650 MG: 325 TABLET, FILM COATED ORAL at 09:43

## 2018-08-23 RX ADMIN — VITAMIN A, VITAMIN C, VITAMIN D-3, VITAMIN E, VITAMIN B-1, VITAMIN B-2, NIACIN, VITAMIN B-6, CALCIUM, IRON, ZINC, COPPER 1 TABLET: 4000; 120; 400; 22; 1.84; 3; 20; 10; 1; 12; 200; 27; 25; 2 TABLET ORAL at 08:32

## 2018-08-23 RX ADMIN — GABAPENTIN 600 MG: 300 CAPSULE ORAL at 17:43

## 2018-08-23 RX ADMIN — CELECOXIB 200 MG: 100 CAPSULE ORAL at 20:33

## 2018-08-23 RX ADMIN — RANITIDINE 150 MG: 150 TABLET ORAL at 08:32

## 2018-08-23 RX ADMIN — NICOTINE POLACRILEX 4 MG: 2 GUM, CHEWING ORAL at 22:20

## 2018-08-23 RX ADMIN — NICOTINE POLACRILEX 4 MG: 2 GUM, CHEWING ORAL at 11:45

## 2018-08-23 RX ADMIN — GABAPENTIN 600 MG: 300 CAPSULE ORAL at 12:26

## 2018-08-23 ASSESSMENT — ACTIVITIES OF DAILY LIVING (ADL)
GROOMING: INDEPENDENT
ORAL_HYGIENE: INDEPENDENT
DRESS: INDEPENDENT;SCRUBS (BEHAVIORAL HEALTH)
GROOMING: INDEPENDENT
DRESS: SCRUBS (BEHAVIORAL HEALTH)
LAUNDRY: UNABLE TO COMPLETE
ORAL_HYGIENE: INDEPENDENT

## 2018-08-23 NOTE — PLAN OF CARE
Problem: Patient Care Overview  Goal: Team Discussion  Team Plan:   BEHAVIORAL TEAM DISCUSSION    Participants: Haily Tomlinson NP, Michael Aranda NP, Toya Wolf NP, Renetta Quinteros LSW,  Geno Hargrove LSW, Britni Thomas RN, Marilyn Irvin OT, Haily Fuchs OT, Vanessa Diez OT  Progress: fair, manic, pressured  Continued Stay Criteria/Rationale: schedule clonadine  Medical/Physical: chemo - active breast cancer  Precautions:   Behavioral Orders   Procedures     Code 1 - Restrict to Unit     Routine Programming     As clinically indicated     Status 15     Every 15 minutes.     Plan: chemo on Monday,  to determine commitment by tomorrow  Rationale for change in precautions or plan: None

## 2018-08-23 NOTE — PLAN OF CARE
Problem: Patient Care Overview  Goal: Individualization & Mutuality  Patient will be compliant with treatment team recommendations.  Patient will report at least 6-8 hours of sleep each night.    8/21/18 Patient may keep wet wipes in her bathroom per patient care order for proper hygiene after chemo treatments.  Patient will attend groups.   Outcome: No Change  Pt has been in bed with eyes closed and regular respirations observed all night. Will continue to monitor.

## 2018-08-23 NOTE — PLAN OF CARE
Face to face end of shift report received from Raheel ABAD RN. Rounding completed. Patient observed in [facility].     Britni Thomas  8/23/2018  8:24 AM

## 2018-08-23 NOTE — PLAN OF CARE
Problem: Patient Care Overview  Goal: Individualization & Mutuality  Patient will be compliant with treatment team recommendations.  Patient will report at least 6-8 hours of sleep each night.    8/21/18 Patient may keep wet wipes in her bathroom per patient care order for proper hygiene after chemo treatments.  Patient will attend groups.   Pt is irritable and dismissive toward staff.  She admits to ongoing anxiety.  Denies pain, depression, SI, HI, and hallucinations.  Has been pacing the hallways this morning with headphones on.  Requested PRN tyl 650mg which was given by another at 0943.  Pt requested PRN gabapentin for anxiety at 1139. Pt also used PRN apha stim for anxiety. Pt later reported effective.  She has been attending groups and socializing with peers.  Cooperative with all medications.      Problem: Thought Process Alteration (Adult)  Goal: Identify Related Risk Factors and Signs and Symptoms  Patient will be compliant with medications and treatment team recommendations while on unit.    Pt is compliant with treatment recommendations.   Goal: Improved Thought Process  Patient will hold reality based conversations prior to discharge.    Pt is able to hold a reality based conversation this shift.

## 2018-08-23 NOTE — PROGRESS NOTES
08/23/18 1338   Signing Clinician's Name / Credentials   Signing clinician's name / credentials Vanessa Diez OTR/L   Quick Adds   Rehab Discipline OT   Therapeutic Activity   Minutes of Treatment 40 minutes   Symptoms Noted During/After Treatment None   Treatment Detail Pt seen for pain management and healthy coping.  Pt rates L foot/ankle pain at 8/10 prior to tx.  Healing touch provided (chakra connection, modified mesmeric clearing, noels mind clear) for pain management and relaxation.  Reviewed healthy coping skills that have been presented in past OT sessions.  Pt reports healing touch, ice pack, taping, and affirmations have been helpful.  Demonstrated basic yoga asanas with pt in room; able to complete safely/effectively utilizing handout pictures.  Education provided on support networks; identification completed of multiple supports to utilize post d/c for health management and relapse prevention.  Pain decreased to 3/10 post healing touch modalities.    Additional Documentation   Rehab Comments Pt presents today slightly pressured in speech; she does refer to illuminate 2x during OT session.  Pt refers to self as medicine woman at times.  Healing touch proving to be effective for pain control at this time.   OT Plan cont 3x/week for coping and calming   Total Session Time   Total Session Time (minutes) 40 minutes

## 2018-08-23 NOTE — PROGRESS NOTES
[facility]  Psychiatric Progress Note    Subjective   This is a 28 year old female with schizoaffective disorder, bipolar type and polysubstance abuse.    Patient interviewed today in room. Stated she is planning on staying until cancer treatment is done. Stated she slept well last night and appetite is good. She is hyper verbal today with flight of ideas.  Denies side effects from medications. Patient applied makeup today, able to do detailed eye makeup including liner.  Patient has been attending groups and social with peers displaying appropriate boundaries.     Patient seen again at 14:40 and reports pain with port site.  Exam done with RN present. Port side or right upper chest wall is erythemas, warm to touch and noted to have some discomfort when pressure was applied. Patient expressed concerns about cellulitis as she has had it in past. Plan to do wound consult, ice to area and tylenol for discomfort. Patient agreeable.    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          Medications:       amantadine  100 mg Oral BID     celecoxib  200 mg Oral BID     cholecalciferol  2,000 Units Oral Daily     cloNIDine  0.1 mg Oral TID     cyanocobalamin  1,000 mcg Oral Daily     fish oil-omega-3 fatty acids  1 g Oral BID     gabapentin  600 mg Oral 4x Daily     lamoTRIgine (LaMICtal) tablet 25 mg  25 mg Oral BID     loratadine  10 mg Oral Daily     nicotine  1 patch Transdermal Daily     nicotine   Transdermal Q8H     nicotine   Transdermal Daily  "    PNV PRENATAL PLUS MULTIVITAMIN  1 tablet Oral Daily     pyridOXINE  25 mg Oral Daily     ranitidine  150 mg Oral BID     risperiDONE microspheres  50 mg Intramuscular Q14 Days     saccharomyces boulardii  250 mg Oral BID     acetaminophen, alum & mag hydroxide-simethicone, amantadine, gabapentin, hydrOXYzine (VISTARIL) capsule  mg, LORazepam, magnesium hydroxide, nicotine polacrilex, risperiDONE          Allergies:     Allergies   Allergen Reactions     Azithromycin Anaphylaxis     Abilify [Aripiprazole] Unknown     Wellbutrin [Bupropion] Other (See Comments)     suicidal     Zithromax [Azithromycin Dihydrate]           Psychiatric Examination:   /92  Pulse 96  Temp 99.1  F (37.3  C) (Tympanic)  Resp 18  Ht 1.651 m (5' 5\")  Wt 99.6 kg (219 lb 8 oz)  SpO2 96%  BMI 36.53 kg/m2  Weight is 219 lbs 8 oz  Body mass index is 36.53 kg/(m^2).    Appearance:  awake, alert, adequately groomed and dressed in hospital scrubs  Attitude:  cooperative  Eye Contact:  good  Mood:  better  Affect:  mood congruent, intensity is heightened and intensity is dramatic  Speech:  clear, coherent- rapid at times  Psychomotor Behavior:  no evidence of tardive dyskinesia, dystonia, or tics and intact station, gait and muscle tone  Thought Process:  goal oriented  Associations:  no loose associations  Thought Content:  no evidence of suicidal ideation or homicidal ideation  Insight:  fair  Judgment:  fair  Oriented to:  time, person, and place  Attention Span and Concentration:  fair  Recent and Remote Memory:  fair  Fund of Knowledge: appropriate  Muscle Strength and Tone: normal  Gait and Station: Normal  Perception: no perceptual disturbance noted         Labs:   No results found for this or any previous visit (from the past 24 hour(s)).        Assessment/ Plan:   Continue Inpatient Hospitalization  Continue to provide a safe environment and therapeutic milieu.  Change clonidine to tid rather than prn  Change vitals to " Q12 hours as intermitted BP causes increased anxiety.   Lamictal increased 8/21/18.   Consult for wound to assess skin at port site.     ELOS:  >5 days due to mood stability and completion of cancer infusions on 8/27/18

## 2018-08-23 NOTE — PLAN OF CARE
Face to face end of shift report received from Laura SOTOMAYOR RN. Rounding completed. Patient observed.     Raheel Wallace  8/23/2018  12:12 AM

## 2018-08-24 ENCOUNTER — TELEPHONE (OUTPATIENT)
Dept: INFUSION THERAPY | Facility: OTHER | Age: 28
End: 2018-08-24

## 2018-08-24 LAB
ALBUMIN SERPL-MCNC: 3.6 G/DL (ref 3.4–5)
ALP SERPL-CCNC: 96 U/L (ref 40–150)
ALT SERPL W P-5'-P-CCNC: 25 U/L (ref 0–50)
ANION GAP SERPL CALCULATED.3IONS-SCNC: 9 MMOL/L (ref 3–14)
AST SERPL W P-5'-P-CCNC: 10 U/L (ref 0–45)
BASOPHILS # BLD AUTO: 0 10E9/L (ref 0–0.2)
BASOPHILS NFR BLD AUTO: 0.3 %
BILIRUB SERPL-MCNC: 0.4 MG/DL (ref 0.2–1.3)
BUN SERPL-MCNC: 11 MG/DL (ref 7–30)
CALCIUM SERPL-MCNC: 8.8 MG/DL (ref 8.5–10.1)
CHLORIDE SERPL-SCNC: 106 MMOL/L (ref 94–109)
CO2 SERPL-SCNC: 23 MMOL/L (ref 20–32)
CREAT SERPL-MCNC: 0.46 MG/DL (ref 0.52–1.04)
DIFFERENTIAL METHOD BLD: ABNORMAL
EOSINOPHIL # BLD AUTO: 0 10E9/L (ref 0–0.7)
EOSINOPHIL NFR BLD AUTO: 0 %
ERYTHROCYTE [DISTWIDTH] IN BLOOD BY AUTOMATED COUNT: 18.1 % (ref 10–15)
GFR SERPL CREATININE-BSD FRML MDRD: >90 ML/MIN/1.7M2
GLUCOSE SERPL-MCNC: 97 MG/DL (ref 70–99)
HCT VFR BLD AUTO: 33 % (ref 35–47)
HGB BLD-MCNC: 11.1 G/DL (ref 11.7–15.7)
IMM GRANULOCYTES # BLD: 0 10E9/L (ref 0–0.4)
IMM GRANULOCYTES NFR BLD: 0.3 %
LYMPHOCYTES # BLD AUTO: 1.7 10E9/L (ref 0.8–5.3)
LYMPHOCYTES NFR BLD AUTO: 18.2 %
MCH RBC QN AUTO: 27.8 PG (ref 26.5–33)
MCHC RBC AUTO-ENTMCNC: 33.6 G/DL (ref 31.5–36.5)
MCV RBC AUTO: 83 FL (ref 78–100)
MONOCYTES # BLD AUTO: 0.4 10E9/L (ref 0–1.3)
MONOCYTES NFR BLD AUTO: 4.1 %
NEUTROPHILS # BLD AUTO: 7.3 10E9/L (ref 1.6–8.3)
NEUTROPHILS NFR BLD AUTO: 77.1 %
NRBC # BLD AUTO: 0 10*3/UL
NRBC BLD AUTO-RTO: 0 /100
PLATELET # BLD AUTO: 296 10E9/L (ref 150–450)
POTASSIUM SERPL-SCNC: 4 MMOL/L (ref 3.4–5.3)
PROCALCITONIN SERPL-MCNC: <0.05 NG/ML
PROT SERPL-MCNC: 7.4 G/DL (ref 6.8–8.8)
RBC # BLD AUTO: 4 10E12/L (ref 3.8–5.2)
SODIUM SERPL-SCNC: 138 MMOL/L (ref 133–144)
WBC # BLD AUTO: 9.5 10E9/L (ref 4–11)

## 2018-08-24 PROCEDURE — 25000132 ZZH RX MED GY IP 250 OP 250 PS 637: Mod: GY | Performed by: NURSE PRACTITIONER

## 2018-08-24 PROCEDURE — A9270 NON-COVERED ITEM OR SERVICE: HCPCS | Mod: GY | Performed by: NURSE PRACTITIONER

## 2018-08-24 PROCEDURE — 36415 COLL VENOUS BLD VENIPUNCTURE: CPT | Performed by: NURSE PRACTITIONER

## 2018-08-24 PROCEDURE — 85025 COMPLETE CBC W/AUTO DIFF WBC: CPT | Performed by: NURSE PRACTITIONER

## 2018-08-24 PROCEDURE — A9270 NON-COVERED ITEM OR SERVICE: HCPCS | Mod: GY | Performed by: INTERNAL MEDICINE

## 2018-08-24 PROCEDURE — 99221 1ST HOSP IP/OBS SF/LOW 40: CPT | Performed by: INTERNAL MEDICINE

## 2018-08-24 PROCEDURE — 12400011

## 2018-08-24 PROCEDURE — 25000132 ZZH RX MED GY IP 250 OP 250 PS 637: Mod: GY | Performed by: INTERNAL MEDICINE

## 2018-08-24 PROCEDURE — 99232 SBSQ HOSP IP/OBS MODERATE 35: CPT | Performed by: NURSE PRACTITIONER

## 2018-08-24 PROCEDURE — 87040 BLOOD CULTURE FOR BACTERIA: CPT | Performed by: NURSE PRACTITIONER

## 2018-08-24 PROCEDURE — 84145 PROCALCITONIN (PCT): CPT | Performed by: INTERNAL MEDICINE

## 2018-08-24 PROCEDURE — 80053 COMPREHEN METABOLIC PANEL: CPT | Performed by: NURSE PRACTITIONER

## 2018-08-24 RX ORDER — DIPHENHYDRAMINE HCL 50 MG
50 CAPSULE ORAL EVERY 6 HOURS PRN
Status: DISCONTINUED | OUTPATIENT
Start: 2018-08-24 | End: 2018-09-12 | Stop reason: HOSPADM

## 2018-08-24 RX ORDER — RISPERIDONE 1 MG/1
1 TABLET, ORALLY DISINTEGRATING ORAL 2 TIMES DAILY
Status: DISCONTINUED | OUTPATIENT
Start: 2018-08-24 | End: 2018-08-26 | Stop reason: ALTCHOICE

## 2018-08-24 RX ADMIN — LAMOTRIGINE 25 MG: 25 TABLET ORAL at 20:17

## 2018-08-24 RX ADMIN — CELECOXIB 200 MG: 100 CAPSULE ORAL at 20:17

## 2018-08-24 RX ADMIN — RISPERIDONE 1 MG: 1 TABLET, ORALLY DISINTEGRATING ORAL at 21:42

## 2018-08-24 RX ADMIN — AMANTADINE 100 MG: 100 CAPSULE ORAL at 09:52

## 2018-08-24 RX ADMIN — Medication 1 G: at 20:17

## 2018-08-24 RX ADMIN — NICOTINE POLACRILEX 4 MG: 2 GUM, CHEWING ORAL at 14:11

## 2018-08-24 RX ADMIN — GABAPENTIN 600 MG: 300 CAPSULE ORAL at 09:52

## 2018-08-24 RX ADMIN — GABAPENTIN 300 MG: 300 CAPSULE ORAL at 12:35

## 2018-08-24 RX ADMIN — CELECOXIB 200 MG: 100 CAPSULE ORAL at 09:51

## 2018-08-24 RX ADMIN — AMANTADINE 100 MG: 100 CAPSULE ORAL at 05:10

## 2018-08-24 RX ADMIN — RANITIDINE 150 MG: 150 TABLET ORAL at 20:17

## 2018-08-24 RX ADMIN — NICOTINE 1 PATCH: 21 PATCH, EXTENDED RELEASE TRANSDERMAL at 09:52

## 2018-08-24 RX ADMIN — CEPHALEXIN 250 MG: 250 CAPSULE ORAL at 20:21

## 2018-08-24 RX ADMIN — Medication 1 G: at 09:51

## 2018-08-24 RX ADMIN — AMANTADINE 100 MG: 100 CAPSULE ORAL at 20:18

## 2018-08-24 RX ADMIN — GABAPENTIN 600 MG: 300 CAPSULE ORAL at 12:01

## 2018-08-24 RX ADMIN — GABAPENTIN 300 MG: 300 CAPSULE ORAL at 03:08

## 2018-08-24 RX ADMIN — HYDROXYZINE PAMOATE 100 MG: 50 CAPSULE ORAL at 13:46

## 2018-08-24 RX ADMIN — ACETAMINOPHEN 650 MG: 325 TABLET, FILM COATED ORAL at 01:54

## 2018-08-24 RX ADMIN — ACETAMINOPHEN 650 MG: 325 TABLET, FILM COATED ORAL at 14:24

## 2018-08-24 RX ADMIN — NICOTINE POLACRILEX 4 MG: 2 GUM, CHEWING ORAL at 02:49

## 2018-08-24 RX ADMIN — CLONIDINE HYDROCHLORIDE 0.1 MG: 0.1 TABLET ORAL at 09:51

## 2018-08-24 RX ADMIN — GABAPENTIN 600 MG: 300 CAPSULE ORAL at 18:03

## 2018-08-24 RX ADMIN — Medication 25 MG: at 09:51

## 2018-08-24 RX ADMIN — VITAMIN D, TAB 1000IU (100/BT) 2000 UNITS: 25 TAB at 09:52

## 2018-08-24 RX ADMIN — CLONIDINE HYDROCHLORIDE 0.1 MG: 0.1 TABLET ORAL at 20:17

## 2018-08-24 RX ADMIN — NICOTINE POLACRILEX 4 MG: 2 GUM, CHEWING ORAL at 20:18

## 2018-08-24 RX ADMIN — DIPHENHYDRAMINE HYDROCHLORIDE 50 MG: 50 CAPSULE ORAL at 21:42

## 2018-08-24 RX ADMIN — HYDROXYZINE PAMOATE 50 MG: 50 CAPSULE ORAL at 03:08

## 2018-08-24 RX ADMIN — RISPERIDONE 1 MG: 1 TABLET, ORALLY DISINTEGRATING ORAL at 03:08

## 2018-08-24 RX ADMIN — GABAPENTIN 600 MG: 300 CAPSULE ORAL at 20:17

## 2018-08-24 RX ADMIN — CEPHALEXIN 250 MG: 250 CAPSULE ORAL at 15:36

## 2018-08-24 RX ADMIN — NICOTINE POLACRILEX 4 MG: 2 GUM, CHEWING ORAL at 00:13

## 2018-08-24 RX ADMIN — RANITIDINE 150 MG: 150 TABLET ORAL at 09:52

## 2018-08-24 RX ADMIN — NICOTINE POLACRILEX 4 MG: 2 GUM, CHEWING ORAL at 01:22

## 2018-08-24 RX ADMIN — Medication 250 MG: at 09:52

## 2018-08-24 RX ADMIN — LORATADINE 10 MG: 10 TABLET ORAL at 09:52

## 2018-08-24 RX ADMIN — LAMOTRIGINE 25 MG: 25 TABLET ORAL at 09:52

## 2018-08-24 RX ADMIN — RISPERIDONE 1 MG: 1 TABLET, ORALLY DISINTEGRATING ORAL at 11:54

## 2018-08-24 RX ADMIN — RISPERIDONE 1 MG: 1 TABLET, ORALLY DISINTEGRATING ORAL at 20:18

## 2018-08-24 RX ADMIN — Medication 250 MG: at 20:17

## 2018-08-24 RX ADMIN — VITAMIN A, VITAMIN C, VITAMIN D-3, VITAMIN E, VITAMIN B-1, VITAMIN B-2, NIACIN, VITAMIN B-6, CALCIUM, IRON, ZINC, COPPER 1 TABLET: 4000; 120; 400; 22; 1.84; 3; 20; 10; 1; 12; 200; 27; 25; 2 TABLET ORAL at 09:52

## 2018-08-24 RX ADMIN — CYANOCOBALAMIN TAB 1000 MCG 1000 MCG: 1000 TAB at 09:51

## 2018-08-24 RX ADMIN — NICOTINE POLACRILEX 4 MG: 2 GUM, CHEWING ORAL at 04:11

## 2018-08-24 RX ADMIN — CLONIDINE HYDROCHLORIDE 0.1 MG: 0.1 TABLET ORAL at 13:42

## 2018-08-24 ASSESSMENT — ACTIVITIES OF DAILY LIVING (ADL)
ORAL_HYGIENE: INDEPENDENT
GROOMING: INDEPENDENT
LAUNDRY: UNABLE TO COMPLETE
DRESS: INDEPENDENT;SCRUBS (BEHAVIORAL HEALTH)

## 2018-08-24 NOTE — PLAN OF CARE
Face to face end of shift report received from Britni Marcos. Rounding completed. Patient observed in lounge area.    Laura Barker  8/23/2018  7:17 PM

## 2018-08-24 NOTE — PLAN OF CARE
Face to face end of shift report received from Laura SOTOMAYOR RN. Rounding completed. Patient observed in lounge area.     Raheel Wallace  8/23/2018  11:51 PM

## 2018-08-24 NOTE — PLAN OF CARE
Problem: Patient Care Overview  Goal: Individualization & Mutuality  Patient will be compliant with treatment team recommendations.  Patient will report at least 6-8 hours of sleep each night.    8/21/18 Patient may keep wet wipes in her bathroom per patient care order for proper hygiene after chemo treatments.  Patient will attend groups.   Outcome: Declining  Pt has been up in the lounge area wake this shift. She stated that she does not feel tired. Pt did use alpha stim for anxiety and to see if it would help her fall asleep. After using this pt reported that it did help with anxiety but she continued to not feel tired. Pt received PRN tylenol for 3 out of 10. She continued to sit in the lounge area coloring and listening to music. Pt did come up to the nurses station and requested something for anxiety. She appeared tense and anxious. At 0308 pt received PRN gabapentin, atarax 50 mg and Risperdal. Pt thanked staff and returned to coloring. Will continue to monitor.     0510- Pt received PRN symmetrel for akathisia.  Pt reported that she was having a feeling of restlessness and felt as though she could not stay still.     0600- Pt finally appeared to be sleeping by 0600 with eyes closed and respirations regular.

## 2018-08-24 NOTE — PLAN OF CARE
Face to face end of shift report received from Raheel ABAD RN. Rounding completed. Patient observed in sleeping in room.  Did not further distrub.     Britni Thomas  8/24/2018  8:16 AM

## 2018-08-24 NOTE — PLAN OF CARE
Received a call from infusion.  She stated that pt is scheduled for chemo on 8/27 and pt needs to check in at 8 am at hospital admitting desk.

## 2018-08-24 NOTE — PLAN OF CARE
Face to face end of shift report received from Britni VAZQUEZ RN. Rounding completed. Patient observed sitting in day room. No requests at this time.      Celi Melo  8/24/2018  3:41 PM

## 2018-08-24 NOTE — CONSULTS
[facility]    Hospitalist Consultation    Date of Admission:  8/6/2018  Date of Consult (When I saw the patient): 08/24/18    Assessment & Plan   Marti Javier is a 28 year old  man who was admitted on 8/6/2018. I was asked by Toya Wolf to see the patient for evaluation of her implanted port    1.  Implanted port condition  Minimal erythema no significant warmth immediately above the port.  Not highly suggestive of any active infectious process although difficult to eliminate with confidence.  No leukocytosis.  Pro-calcitonin below detectable levels and certainly this argues he had any significant infection although certainly cellulitis may not yield any systemic inflammatory response.  This is quite concerning for the patient and she like to be certain that there are no problems with the port.  There is a mild degree of fluid but appear to be present throughout the subcutaneous pocket.  No tenderness.  Likely this is lymphatic tissue and not of great concern.  The patient does note she has had this intermittently in the past.  While cellulitis not highly likely perhaps a course of oral antibiotics for 3-5 days would not be unreasonable.  I taken the liberty of prescribing Keflex 250 mg every 8 hours for 5 days.  Blood cultures were obtained.  Over 50% of the time even in significant cellulitis these are unrevealing however will review these over the next several days.    DVT Prophylaxis: Deferred to behavioral health staff  Code Status: Full Code    Disposition: Depends on behavioral health issues    Oracio Nam    Reason for Consult   Reason for consult: Request for evaluation of implanted port condition    Primary Care Physician   Physician No Ref-Primary    Chief Complaint   Condition of implanted port    History is obtained from the patient evaluation of available record    History of Present Illness   Marti Javier is a 28 year old  woman who is currently being treated on behavioral  health unit for issues which on review of records includes schizoaffective disorder and chemical dependency.  She also carries a history of mild intermittent asthma as well as breast carcinoma for which she is undergoing chemotherapy, to complete on Monday.  Concerns were raised regarding fluid collection around her implanted port as well as erythema.  Patient herself on my discussion with her nurse that she saw this redness yesterday and was concerned about it.  She is not able to relate if in fact she has any fevers because she indicates her temperature is variable largely because of other issues in life.  She has not had any chills, rigors, sweats, or diaphoresis.  Appetite is been good.  She does note that she had an episode of cellulitis around report during an admission during which she was treated for pneumonia as well.  She is concerned about a possible cellulitis and particularly concerned that any problems might interfere with her therapy which is to complete Monday after her next infusion.    Past medical history  I have reviewed this patient's medical history and updated it with pertinent information if needed.   Past Medical History:   Diagnosis Date     Illicit drug use     + THC, counseled 10/9/12. SS referral. UDS on Admit.     Insomnia  8/29/2012     Malignant neoplasm of upper-outer quadrant of left breast in female, estrogen receptor positive (H) 8/10/2018     Obesity      Positive urine drug screen 11/10/2014    positive for THC and positive for THC and amphetamines 10/2013     Psychosis 1/19/2015     Schizoaffective disorder, bipolar type 8/29/2012    bipolar type with psychotic features; inpatient 11/2014 to stepdown at [facility]     Seizure 8/6/2012     Substance abuse      Tobacco abuse      Uncomplicated asthma        Past Surgical History   I have reviewed this patient's surgical history and updated it with pertinent information if needed.  Past Surgical History:   Procedure Laterality Date      genitlinda warts removed       PE TUBES         Prior to Admission Medications   Prior to Admission Medications   Prescriptions Last Dose Informant Patient Reported? Taking?   ATOMOXETINE HCL PO  Pharmacy Yes Yes   Sig: Take 80 mg by mouth daily   CELECOXIB PO  Pharmacy Yes Yes   Sig: Take 200 mg by mouth 2 times daily   CLONAZEPAM PO  Pharmacy Yes Yes   Sig: Take 1 mg by mouth 2 times daily as needed for anxiety Take 1-1 1/2 tablets PRN BID   HYDROXYZINE PAMOATE PO  Pharmacy Yes Yes   Sig: Take  mg by mouth 3 times daily as needed for anxiety   LAMOTRIGINE PO  Pharmacy Yes Yes   Sig: Take 100 mg by mouth 2 times daily   Lisdexamfetamine Dimesylate (VYVANSE PO)  Pharmacy Yes Yes   Sig: Take 60 mg by mouth daily   Prenatal Vit-Fe Fumarate-FA (PRENATAL MULTIVITAMIN PLUS IRON) 27-0.8 MG TABS per tablet  Pharmacy Yes Yes   Sig: Take 1 tablet by mouth daily   RANITIDINE HCL PO  Pharmacy Yes Yes   Sig: Take 150 mg by mouth 2 times daily   fish oil-omega-3 fatty acids 1000 MG capsule  Pharmacy Yes Yes   Sig: Take 1 g by mouth 2 times daily   lidocaine-prilocaine (EMLA) cream  Pharmacy Yes Yes   Sig: Apply topically as needed for moderate pain   loratadine (CLARITIN) 10 MG tablet  Pharmacy Yes Yes   Sig: Take 10 mg by mouth daily   mupirocin (BACTROBAN) 2 % ointment  Pharmacy Yes Yes   Sig: Apply topically as needed   risperiDONE microspheres (RISPERDAL CONSTA) 50 MG injection  Pharmacy Yes Yes   Sig: Inject 50 mg into the muscle every 14 days Paperwork and patient report injection due again tomorrow on 8/7/18.   tolnaftate (TINACTIN) 1 % cream  Pharmacy Yes Yes   Sig: Apply topically as needed for irritation      Facility-Administered Medications: None     Allergies   Allergies   Allergen Reactions     Azithromycin Anaphylaxis     Abilify [Aripiprazole] Unknown     Wellbutrin [Bupropion] Other (See Comments)     suicidal     Zithromax [Azithromycin Dihydrate]        Social History   I have reviewed this patient's  social history and updated it with pertinent information if needed. Marti Javier  reports that she has been smoking Cigarettes.  She has a 20.00 pack-year smoking history. She has never used smokeless tobacco. She reports that she does not drink alcohol or use illicit drugs.    Family History   I have reviewed this patient's family history and updated it with pertinent information if needed.   Family History   Problem Relation Age of Onset     Depression Mother      Hypertension Mother      HEART DISEASE Maternal Grandmother      Diabetes Maternal Grandfather      HEART DISEASE Paternal Grandfather      heart attack     Unknown/Adopted Father      Asthma Father      HEART DISEASE Other      Cerebrovascular Disease Other      Thyroid Disease Brother      Asthma Sister        Review of Systems   The 10 point Review of Systems is negative other than noted in the HPI    Physical Exam   Temp: 98.6  F (37  C) Temp src: Tympanic BP: 154/94 Pulse: 102 (radial)   Resp: 18 SpO2: 98 %      Vital Signs with Ranges  Temp:  [98.6  F (37  C)-99.4  F (37.4  C)] 98.6  F (37  C)  Pulse:  [102-128] 102  Resp:  [15-18] 18  BP: (118-154)/(80-94) 154/94  SpO2:  [98 %-100 %] 98 %  219 lbs 8 oz    Awake, alert, speech clear.  Pleasant and interactive.  Thought process somewhat disorganized.  HEENT: Pupils equal, conjugate. No icterus or nystagmus. Oral mucosa moist. No facial asymmetry.   Neck: Supple, jugular veins not elevated. Trachea midline   Chest: Implanted port with minimal erythema immediately above the port.  No warmth is appreciated.  Mild amount of fluid surrounding the port in a distribution consistent with subcutaneous pocket.  No chest wall movement asymmetry. Aeration preserved to bases. Accessory muscles not in use. Expiratory time not increased. No tidal wheezes. No rhonchi. No discrete crackles.   Cardiac: PMI not displaced. S1, S2 unremarkable. No S3, S4. P2 not accentuated. No murmurs. Carotid upstroke preserved.  Carotid amplitude preserved.   Abdomen: Soft. No palpation or percussion tenderness. No distention. Normoactive bowel sounds. Liver and spleen not increased in size. No bruits, masses, or pulsations.   Extremities: No lower extremity edema.  Warm distally.  Brisk capillary refill.  No eccymoses, clubbing.   Neurologic: Mental state above. Motor 5/5 and bilaterally equal. No fasiculations or tremors.  Data   -Data reviewed today: All pertinent laboratory and imaging results from this encounter were reviewed.     Recent Labs  Lab 08/24/18  1151 08/20/18  1100   WBC 9.5 8.6   HGB 11.1* 11.1*   MCV 83 82    312    140   POTASSIUM 4.0 4.1   CHLORIDE 106 108   CO2 23 26   BUN 11 12   CR 0.46* 0.35*   ANIONGAP 9 6   SONDRA 8.8 8.9   GLC 97 88   ALBUMIN 3.6 3.5   PROTTOTAL 7.4 7.0   BILITOTAL 0.4 0.2   ALKPHOS 96 100   ALT 25 28   AST 10 13     procalcitonin < 0.05       No results found for this or any previous visit (from the past 24 hour(s)).

## 2018-08-24 NOTE — PLAN OF CARE
Received a call from Ameena in infusion.  States that pt needs to be assess for infection by Dr. García prior to chemo.  Pt's 8/27 0800 am chemo apt has been canceled. Pt now has an apt for 8/27 at 1000 to see Dr. García in the [facility] clinic to assess for infection.

## 2018-08-24 NOTE — PLAN OF CARE
Problem: Patient Care Overview  Goal: Individualization & Mutuality  Patient will be compliant with treatment team recommendations.  Patient will report at least 6-8 hours of sleep each night.    8/21/18 Patient may keep wet wipes in her bathroom per patient care order for proper hygiene after chemo treatments.  Patient will attend groups.   Outcome: Improving  Patient has been up and about on unit. Patient is tense. Voice tones loud. States she is concerned about her port site on upper R sternal area. Looked at it with Toya who was on unit. Is warm to touch and is slightly reddened. New orders received. Patient did put ice on area which helped and had tylenol. Denies any hallucinations.Used Alpha Stim for anxiety which she stated did help.    Problem: Thought Process Alteration (Adult)  Goal: Identify Related Risk Factors and Signs and Symptoms  Patient will be compliant with medications and treatment team recommendations while on unit.    Outcome: Improving  Patient is compliant with medications.  Goal: Improved Thought Process  Patient will hold reality based conversations prior to discharge.    Outcome: Improving  Patient is able to hold a reality based conversation but is dismissive in conversation.

## 2018-08-24 NOTE — PROGRESS NOTES
"[facility]  Psychiatric Progress Note    Subjective   This is a 28 year old female with schizoaffective disorder, bipolar type and polysubstance abuse.    Patient continues to display manic symptoms of hyper verbal, decreased sleep (3 hours total), flight of ideas at times and loose associations.  Plan to restart oral scheduled Risperdal as next Risperdal Consta injection not due until 8/28/18.  Patient met with  today and was observed to be attentive. Had been attending groups. Reports some discomfort in left foot and at port site. Patient agreeable to have lab draw with this writer holding her and distracting patient with conversation.  She insists she does not want to stay here and \"ready to go home\". Discussed her mood is unstable and she has one last chemotherapy appointment. Discussed lack of sleep and encouraged fluids, food and rest as needed. Did inform patient of plan to add Risperdal oral to which she stated \"don't even know if that is working\".  Patient may need oral dose with Consta order or adjustment of Consta dose due to resurgence of symptoms. Patient denies suicidal or self harm ideation. Does have anxiety and appears to respond well to 1:1 visits with others.    Phone call made to Hospitalist, Dr. Kulkarni, regarding redness and warmth at port site along with low grade fever.  Recommended and ordered CMP, CBC and Blood culture at port & peripheral. He reported he will review labs.  Patient had Port site used on 8/20/18 for chemotherapy and has last chemo treatment via port on 8/27/18.    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          Medications:       amantadine  100 mg Oral BID     celecoxib  200 mg Oral BID     cholecalciferol  2,000 Units Oral Daily     cloNIDine  0.1 mg Oral TID     cyanocobalamin  1,000 mcg Oral Daily     fish oil-omega-3 fatty acids  1 g Oral BID     gabapentin  600 mg Oral 4x Daily     lamoTRIgine (LaMICtal) tablet 25 mg  25 mg Oral BID     loratadine  10 mg Oral Daily     nicotine  1 patch Transdermal Daily     nicotine   Transdermal Q8H     nicotine   Transdermal Daily     PNV PRENATAL PLUS MULTIVITAMIN  1 tablet Oral Daily     pyridOXINE  25 mg Oral Daily     ranitidine  150 mg Oral BID     risperiDONE  1 mg Sublingual BID     risperiDONE microspheres  50 mg Intramuscular Q14 Days     saccharomyces boulardii  250 mg Oral BID     acetaminophen, alum & mag hydroxide-simethicone, amantadine, gabapentin, hydrOXYzine (VISTARIL) capsule  mg, LORazepam, magnesium hydroxide, nicotine polacrilex, risperiDONE          Allergies:     Allergies   Allergen Reactions     Azithromycin Anaphylaxis     Abilify [Aripiprazole] Unknown     Wellbutrin [Bupropion] Other (See Comments)     suicidal     Zithromax [Azithromycin Dihydrate]           Psychiatric Examination:   /94 (BP Location: Right arm)  Pulse 102  Temp 98.6  F (37  C) (Tympanic)  Resp 18  Ht 1.651 m (5' 5\")  Wt 99.6 kg (219 lb 8 oz)  SpO2 98%  BMI 36.53 kg/m2  Weight is 219 lbs 8 oz  Body mass index is 36.53 kg/(m^2).    Appearance:  awake, alert, adequately groomed and dressed in hospital scrubs  Attitude:  somewhat cooperative  Eye Contact:  good  Mood:  anxious  Affect:  mood congruent, intensity is heightened and intensity is dramatic  Speech:  clear, coherent and hyperverbal   Psychomotor Behavior:  no evidence of tardive dyskinesia, dystonia, or tics and intact station, gait and muscle tone  Thought Process:  goal " oriented  Associations:  loosening of associations present  Thought Content:  no evidence of suicidal ideation or homicidal ideation  Insight:  fair  Judgment:  fair  Oriented to:  time, person, and place  Attention Span and Concentration:  fair  Recent and Remote Memory:  fair  Fund of Knowledge: appropriate  Muscle Strength and Tone: normal  Gait and Station: Normal  Perception: no perceptual disturbance noted         Labs:   No results found for this or any previous visit (from the past 24 hour(s)).        Assessment/ Plan:   Continue Inpatient Hospitalization  Continue to provide a safe environment and therapeutic milieu.  Add Risperdal M tabs 1 mg scheduled bid, continue prn dose. Next Consta due next week.  Lamictal increased 8/21/18.   Consulted with Dr. Kulkarni at 10:57 and ordered CMP, CBC and Blood culture both peripheral and port     ELOS:  >5 days due to mood stability and completion of cancer infusions on 8/27/18

## 2018-08-24 NOTE — PLAN OF CARE
Problem: Patient Care Overview  Goal: Discharge Needs Assessment  Outcome: No Change  Received a call from [facility] requesting updated information on the patient - faxed requested records to [facility].

## 2018-08-24 NOTE — PLAN OF CARE
Problem: Patient Care Overview  Goal: Individualization & Mutuality  Patient will be compliant with treatment team recommendations.  Patient will report at least 6-8 hours of sleep each night.    8/21/18 Patient may keep wet wipes in her bathroom per patient care order for proper hygiene after chemo treatments.  Patient will attend groups.   Pt was sleeping at the beginning of the shift and slept till about 0930.  She c/o high anxiety today and is very irritable .  Pt has been pacing the hallways with headphones on.  Pt used PRN apha stim aroung 1200 for anxiety.  Pt reported it helped a little.  1255-  Pt requested PRN neurontin 300mg at 1255 for anxiety.  Pt continued to have high anxiety and requested PRN atarax 100mg at 1346.  1415- Hospitalist came and saw pt.  1424-Pt requested PRN tylenol 650mg for back pain.  Pt cooperative with all medications this shift.       Problem: Thought Process Alteration (Adult)  Goal: Identify Related Risk Factors and Signs and Symptoms  Patient will be compliant with medications and treatment team recommendations while on unit.    Pt is compliant with treatment team recommendations.

## 2018-08-24 NOTE — PLAN OF CARE
Problem: Patient Care Overview  Goal: Team Discussion  Team Plan:   BEHAVIORAL TEAM DISCUSSION    Participants:  Michael Aranda NP, Toya Wolf NP, Geno Hargrove LSW, Edie Eugene RN, Venus Lyons RN, Chiqui Mcnamara RN, Haily Fuchs OT, Vanessa Diez OT  Progress: fair, hypomanic, tense, no sleep last night  Continued Stay Criteria/Rationale: chemo on Monday, continue to monitor for medication effectiveness and MH stability  Medical/Physical: active breast cancer -  Chemo, port site is hot and red  Precautions:   Behavioral Orders   Procedures     Code 1 - Restrict to Unit     Routine Programming     As clinically indicated     Status 15     Every 15 minutes.     Plan:  to determine if extending comittment today by 4, chemo on Monday  Rationale for change in precautions or plan: none

## 2018-08-24 NOTE — TELEPHONE ENCOUNTER
Received call from Toya in pharmacy that patient is on Keflex for a skin infection.  She is due for chemo Monday.  Spoke with behavioral health staff.  Patient will see Dr. García on Monday at 10:00 and assessed whether or not she can receive chemotherapy.  Staff will bring her to this appointment at 1000.

## 2018-08-25 PROCEDURE — 25000132 ZZH RX MED GY IP 250 OP 250 PS 637: Mod: GY | Performed by: NURSE PRACTITIONER

## 2018-08-25 PROCEDURE — A9270 NON-COVERED ITEM OR SERVICE: HCPCS | Mod: GY | Performed by: NURSE PRACTITIONER

## 2018-08-25 PROCEDURE — 25000132 ZZH RX MED GY IP 250 OP 250 PS 637: Mod: GY | Performed by: INTERNAL MEDICINE

## 2018-08-25 PROCEDURE — 12400011

## 2018-08-25 PROCEDURE — A9270 NON-COVERED ITEM OR SERVICE: HCPCS | Mod: GY | Performed by: INTERNAL MEDICINE

## 2018-08-25 RX ADMIN — LAMOTRIGINE 25 MG: 25 TABLET ORAL at 08:18

## 2018-08-25 RX ADMIN — GABAPENTIN 600 MG: 300 CAPSULE ORAL at 08:19

## 2018-08-25 RX ADMIN — RANITIDINE 150 MG: 150 TABLET ORAL at 20:48

## 2018-08-25 RX ADMIN — Medication 250 MG: at 20:48

## 2018-08-25 RX ADMIN — NICOTINE POLACRILEX 4 MG: 2 GUM, CHEWING ORAL at 13:54

## 2018-08-25 RX ADMIN — RANITIDINE 150 MG: 150 TABLET ORAL at 08:19

## 2018-08-25 RX ADMIN — ACETAMINOPHEN 650 MG: 325 TABLET, FILM COATED ORAL at 08:32

## 2018-08-25 RX ADMIN — VITAMIN A, VITAMIN C, VITAMIN D-3, VITAMIN E, VITAMIN B-1, VITAMIN B-2, NIACIN, VITAMIN B-6, CALCIUM, IRON, ZINC, COPPER 1 TABLET: 4000; 120; 400; 22; 1.84; 3; 20; 10; 1; 12; 200; 27; 25; 2 TABLET ORAL at 08:19

## 2018-08-25 RX ADMIN — GABAPENTIN 600 MG: 300 CAPSULE ORAL at 12:25

## 2018-08-25 RX ADMIN — CEPHALEXIN 250 MG: 250 CAPSULE ORAL at 13:52

## 2018-08-25 RX ADMIN — CLONIDINE HYDROCHLORIDE 0.1 MG: 0.1 TABLET ORAL at 20:47

## 2018-08-25 RX ADMIN — Medication 25 MG: at 08:19

## 2018-08-25 RX ADMIN — NICOTINE POLACRILEX 4 MG: 2 GUM, CHEWING ORAL at 16:04

## 2018-08-25 RX ADMIN — RISPERIDONE 1 MG: 1 TABLET, ORALLY DISINTEGRATING ORAL at 20:48

## 2018-08-25 RX ADMIN — HYDROXYZINE PAMOATE 100 MG: 50 CAPSULE ORAL at 17:02

## 2018-08-25 RX ADMIN — NICOTINE POLACRILEX 4 MG: 2 GUM, CHEWING ORAL at 11:13

## 2018-08-25 RX ADMIN — LAMOTRIGINE 25 MG: 25 TABLET ORAL at 20:48

## 2018-08-25 RX ADMIN — MAGNESIUM HYDROXIDE 30 ML: 400 SUSPENSION ORAL at 04:46

## 2018-08-25 RX ADMIN — CELECOXIB 200 MG: 100 CAPSULE ORAL at 08:19

## 2018-08-25 RX ADMIN — CLONIDINE HYDROCHLORIDE 0.1 MG: 0.1 TABLET ORAL at 13:52

## 2018-08-25 RX ADMIN — CLONIDINE HYDROCHLORIDE 0.1 MG: 0.1 TABLET ORAL at 08:19

## 2018-08-25 RX ADMIN — NICOTINE POLACRILEX 4 MG: 2 GUM, CHEWING ORAL at 08:33

## 2018-08-25 RX ADMIN — CYANOCOBALAMIN TAB 1000 MCG 1000 MCG: 1000 TAB at 08:32

## 2018-08-25 RX ADMIN — NICOTINE 1 PATCH: 21 PATCH, EXTENDED RELEASE TRANSDERMAL at 08:19

## 2018-08-25 RX ADMIN — GABAPENTIN 600 MG: 300 CAPSULE ORAL at 17:02

## 2018-08-25 RX ADMIN — AMANTADINE 100 MG: 100 CAPSULE ORAL at 08:18

## 2018-08-25 RX ADMIN — GABAPENTIN 600 MG: 300 CAPSULE ORAL at 20:48

## 2018-08-25 RX ADMIN — Medication 1 G: at 20:48

## 2018-08-25 RX ADMIN — NICOTINE POLACRILEX 4 MG: 2 GUM, CHEWING ORAL at 04:49

## 2018-08-25 RX ADMIN — CELECOXIB 200 MG: 100 CAPSULE ORAL at 20:48

## 2018-08-25 RX ADMIN — AMANTADINE 100 MG: 100 CAPSULE ORAL at 20:48

## 2018-08-25 RX ADMIN — Medication 250 MG: at 08:18

## 2018-08-25 RX ADMIN — NICOTINE POLACRILEX 4 MG: 2 GUM, CHEWING ORAL at 06:14

## 2018-08-25 RX ADMIN — HYDROXYZINE PAMOATE 100 MG: 50 CAPSULE ORAL at 04:48

## 2018-08-25 RX ADMIN — NICOTINE POLACRILEX 4 MG: 2 GUM, CHEWING ORAL at 07:18

## 2018-08-25 RX ADMIN — ACETAMINOPHEN 650 MG: 325 TABLET, FILM COATED ORAL at 15:37

## 2018-08-25 RX ADMIN — CEPHALEXIN 250 MG: 250 CAPSULE ORAL at 22:25

## 2018-08-25 RX ADMIN — CEPHALEXIN 250 MG: 250 CAPSULE ORAL at 04:46

## 2018-08-25 RX ADMIN — Medication 1 G: at 08:19

## 2018-08-25 RX ADMIN — LORATADINE 10 MG: 10 TABLET ORAL at 08:19

## 2018-08-25 RX ADMIN — RISPERIDONE 1 MG: 1 TABLET, ORALLY DISINTEGRATING ORAL at 08:19

## 2018-08-25 RX ADMIN — VITAMIN D, TAB 1000IU (100/BT) 2000 UNITS: 25 TAB at 08:19

## 2018-08-25 ASSESSMENT — ACTIVITIES OF DAILY LIVING (ADL)
ORAL_HYGIENE: INDEPENDENT
DRESS: SCRUBS (BEHAVIORAL HEALTH);INDEPENDENT
GROOMING: INDEPENDENT
DRESS: INDEPENDENT;SCRUBS (BEHAVIORAL HEALTH)
GROOMING: INDEPENDENT
LAUNDRY: UNABLE TO COMPLETE

## 2018-08-25 NOTE — PLAN OF CARE
Face to face end of shift report received from MITCHELL Black. Rounding completed. Patient observed.     Sally Emanuel  8/25/2018  4:24 PM

## 2018-08-25 NOTE — PLAN OF CARE
"Problem: Patient Care Overview  Goal: Individualization & Mutuality  Patient will be compliant with treatment team recommendations.  Patient will report at least 6-8 hours of sleep each night.    8/21/18 Patient may keep wet wipes in her bathroom per patient care order for proper hygiene after chemo treatments.  Patient will attend groups.   Outcome: No Change  Pt slightly tense and irritable this AM. She offers minimal guarded responses to assessment questions. States that she slept \"fine thank you.\" Takes scheduled meds with no issue. Continues to have frequent requests for PRNs. When pt is asked what is going on with her port, pt states \"I don't even fucking know.\"     0832- Pt requested and received PRN tylenol 650mg for c/o L foot pain rated 8/10.     0900- Pt requesting tylenol. Pt was informed that she had just taken tylenol 30 mins ago.     1100- Pt up to nurses station, requests a scrub jacket because she is cold. Pt was given a jacket. Pt then observed with jacket wrapped around her head. Pt was told by multiple staff that jacket needed to be worn appropriately. Pt stating \"I am cold and I have cancer and it's my right. What if this was a turban? You are violating my Protestant rights. I need this on my head because I am cold.\" Pt then asks if she can have her winter hat out of her belongings. Pt was told no. She remains irritable and angry. Pt filled out a grievance and PCS was notified.     1225- Pt took scheduled gabapentin with no issue. Pt remains irritable. Pt did speak with supervisor regarding wearing a hat on the unit. Pt requesting alpha stim. Pt was told that [facility] so [facility] can monitor her during use of alpha stim. Pt refuses to do so. She states \"Get the supervisor up here again. I need to tell her you're refusing to give me what I want.\" Pt was again told that she was more than welcome to use the alpha stim in the [facility] area. She refuses to do so. She states \"Fine " "then, If you're not going to help me then get out.\" This writer left the room to per pt wishes.     1255- Pt up to nurses station, asking if she can use the alpha stim in group. Pt was allowed to do so. Pt is compliant with returning alpha stim to staff when she is done.    Problem: Thought Process Alteration (Adult)  Goal: Improved Thought Process  Patient will hold reality based conversations prior to discharge.    Outcome: No Change  Pt's conversations have been reality based this shift.       "

## 2018-08-25 NOTE — PLAN OF CARE
"Problem: Patient Care Overview  Goal: Individualization & Mutuality  Patient will be compliant with treatment team recommendations.  Patient will report at least 6-8 hours of sleep each night.    8/21/18 Patient may keep wet wipes in her bathroom per patient care order for proper hygiene after chemo treatments.  Patient will attend groups.   Pt is labile this evening, makes frequent shifts in mood from irritable and demanding to pleasant and cooperative. Denied SI, racing thoughts and depression, stated anxiety is \"getting way better,\" but then made request for multiple PRN anxiety medications within few minutes. Pt described feeling hopeful and being grateful \"that God is in control.\" Shared that she is not sure she wants to follow chemo treatments with radiation, adding that she may look at alternative homeopathic options. Pt is provided with needs as requested, ice to reddened port site on right chest, Tylenol for L foot pain, george gum, PRN Atarax and Alpha Stim. Ate 75% of supper, remained isolative in room.     Problem: Thought Process Alteration (Adult)  Goal: Identify Related Risk Factors and Signs and Symptoms  Patient will be compliant with medications and treatment team recommendations while on unit.    Pt is compliant, makes frequent requests throughout the shift.  Goal: Improved Thought Process  Patient will hold reality based conversations prior to discharge.    Although irritable, conversation is reality-based today.      "

## 2018-08-25 NOTE — PLAN OF CARE
Face to face end of shift report received from MITCHELL Alatorre. Rounding completed. Patient observed in [place].     Sofia Milton  8/25/2018  7:41 AM

## 2018-08-25 NOTE — PROGRESS NOTES
"Received call from charge nurse on  that patient made request to speak with PCS.  Grievance filed by patient due to c/o not being able to wear her hat.  Policy is explained to patient.  She verbalized her frustration and stated \"Policy is not fair, as other people can wear things such as glasses\"   Apologized for complaining, and stated \"I'm just sick of being here, and I want to leave\".  Explained that there was no need to apologize.  She requested nurse bring her medications and her alpha stim.  Message relayed to nurse.    "

## 2018-08-25 NOTE — PLAN OF CARE
"Problem: Patient Care Overview  Goal: Individualization & Mutuality  Patient will be compliant with treatment team recommendations.  Patient will report at least 6-8 hours of sleep each night.    8/21/18 Patient may keep wet wipes in her bathroom per patient care order for proper hygiene after chemo treatments.  Patient will attend groups.   Patient reports feeling \"shitty\" in beginning of shift with reports of no sleep throughout the night. Patient appears restless with a tense affect during conversation with this writer. Laying down to rest/nap by 1600. Up to day room for dinner and back in bed laying down again. During safety checks, patient appeared to be sleeping hard with mild snoring noted. Back up for evening snack and requested to use alpha stim. Compliant with scheduled medications.  Patient running a low grade fever this evening and reports being paranoid stating, \"I'm having hallucinations. I don't feel safe here. What if I have an infection and I'm going to die?\" Patient reassured she was in a safe place and provider notified of low grade fever at this time.   2142- Patient received PRN Risperdal 1 mg per request for anxiety. Also received PRN Benadryl 50 mg per provider.   Laying back in bed to rest shortly after for remainder of shift. Continue to monitor at this time.     Problem: Thought Process Alteration (Adult)  Goal: Identify Related Risk Factors and Signs and Symptoms  Patient will be compliant with medications and treatment team recommendations while on unit.    Continue to monitor at this time.   Goal: Improved Thought Process  Patient will hold reality based conversations prior to discharge.    Continue to monitor at this time.       "

## 2018-08-25 NOTE — PLAN OF CARE
Face to face end of shift report given and care continued with this writer. Rounding completed. Patient observed laying in bed with eyes closed, supine position, non-labored breathing.     Celi Melo  8/25/2018  1:29 AM

## 2018-08-25 NOTE — PLAN OF CARE
"Problem: Patient Care Overview  Goal: Individualization & Mutuality  Patient will be compliant with treatment team recommendations.  Patient will report at least 6-8 hours of sleep each night.    8/21/18 Patient may keep wet wipes in her bathroom per patient care order for proper hygiene after chemo treatments.  Patient will attend groups.   Patient laying in bed with eyes closed and position changes noted for majority of shift. Up in day room around 0430 with c/o upset stomach. Given ginger ale and used alpha stim per request.   0446- Requested/Received PRN Milk of Magnesium for reports of \"some constipation\".   0448- Requested/Received PRN Vistaril 100 mg for reports of anxiety rated 8/10.   Patient walking hallways listening to headphones and sitting in dayroom off/on for remainder of shift. Continue to monitor at this time.         "

## 2018-08-25 NOTE — PROGRESS NOTES
MIGUEL A ALBERTO  Patient requested visit with .  Patient began discussion by noting that she is feeling better although she is frustrated as to why the staff will not let her wear a scarf or hat on her head.  She noted that she had been told this is a policy of the unit but she does not seem to understand why and asked that the decision be reconsidered.  We talked about this and her upcoming final chemo-therapy treatment on Monday.  Patient seemed to be calmer as we discussed and seemed to acquiesce to the situation.  We closed our time in prayer patient noting that she needed to have more patience in her life.

## 2018-08-25 NOTE — PHARMACY
[facility]    Pharmacy      Antimicrobial Stewardship Note     Current antimicrobial therapy:  Anti-infectives (Future)    Start     Dose/Rate Route Frequency Ordered Stop    08/24/18 1445  cephalexin (KEFLEX) capsule 250 mg      250 mg Oral EVERY 8 HOURS SCHEDULED 08/24/18 1441 08/29/18 1359          Indication: SSTI    Days of Therapy: 2     Pertinent labs:  Creatinine   Creatinine   Date Value Ref Range Status   08/24/2018 0.46 (L) 0.52 - 1.04 mg/dL Final   08/20/2018 0.35 (L) 0.52 - 1.04 mg/dL Final   08/13/2018 0.44 (L) 0.52 - 1.04 mg/dL Final     WBC   WBC   Date Value Ref Range Status   08/24/2018 9.5 4.0 - 11.0 10e9/L Final   08/20/2018 8.6 4.0 - 11.0 10e9/L Final   08/13/2018 8.7 4.0 - 11.0 10e9/L Final     Procalcitonin   Procalcitonin   Date Value Ref Range Status   08/24/2018 <0.05 ng/ml Final     Comment:     <0.05 ng/ml  Normal  Recommendation: Very low risk of bacterial infection.   Discourage antibiotics unless strong clinical suspicion for serious infection.       CRP   CRP Inflammation   Date Value Ref Range Status   01/29/2016 8.2 (H) 0.0 - 8.0 mg/L Final       Culture Results:   Blood culture [[id number]] Collected: 08/24/18 1150      Order Status: Completed Lab Status: Preliminary result Updated: 08/25/18 0613     Specimen: Blood from [facility]       Specimen Description Blood      Culture Micro No growth after 18 hours          Recommendations/Interventions:  1. No recommendations at this time    Kaycee Olmstead, [facility]  August 25, 2018

## 2018-08-26 PROCEDURE — A9270 NON-COVERED ITEM OR SERVICE: HCPCS | Mod: GY | Performed by: NURSE PRACTITIONER

## 2018-08-26 PROCEDURE — 25000132 ZZH RX MED GY IP 250 OP 250 PS 637: Mod: GY | Performed by: NURSE PRACTITIONER

## 2018-08-26 PROCEDURE — 99232 SBSQ HOSP IP/OBS MODERATE 35: CPT | Performed by: NURSE PRACTITIONER

## 2018-08-26 PROCEDURE — 12400011

## 2018-08-26 PROCEDURE — A9270 NON-COVERED ITEM OR SERVICE: HCPCS | Mod: GY | Performed by: INTERNAL MEDICINE

## 2018-08-26 PROCEDURE — 25000132 ZZH RX MED GY IP 250 OP 250 PS 637: Mod: GY | Performed by: INTERNAL MEDICINE

## 2018-08-26 PROCEDURE — 99231 SBSQ HOSP IP/OBS SF/LOW 25: CPT | Performed by: INTERNAL MEDICINE

## 2018-08-26 RX ORDER — MINERAL OIL/HYDROPHIL PETROLAT
OINTMENT (GRAM) TOPICAL DAILY PRN
Status: DISCONTINUED | OUTPATIENT
Start: 2018-08-26 | End: 2018-09-12 | Stop reason: HOSPADM

## 2018-08-26 RX ORDER — RISPERIDONE 1 MG/1
1 TABLET, ORALLY DISINTEGRATING ORAL 3 TIMES DAILY PRN
Status: DISCONTINUED | OUTPATIENT
Start: 2018-08-26 | End: 2018-09-12 | Stop reason: HOSPADM

## 2018-08-26 RX ADMIN — GABAPENTIN 600 MG: 300 CAPSULE ORAL at 16:08

## 2018-08-26 RX ADMIN — CLONIDINE HYDROCHLORIDE 0.1 MG: 0.1 TABLET ORAL at 20:35

## 2018-08-26 RX ADMIN — NICOTINE POLACRILEX 4 MG: 2 GUM, CHEWING ORAL at 08:40

## 2018-08-26 RX ADMIN — RANITIDINE 150 MG: 150 TABLET ORAL at 20:35

## 2018-08-26 RX ADMIN — WHITE PETROLATUM: 1.75 OINTMENT TOPICAL at 13:04

## 2018-08-26 RX ADMIN — NICOTINE POLACRILEX 4 MG: 2 GUM, CHEWING ORAL at 17:22

## 2018-08-26 RX ADMIN — HYDROXYZINE PAMOATE 100 MG: 50 CAPSULE ORAL at 17:22

## 2018-08-26 RX ADMIN — RISPERIDONE 1 MG: 1 TABLET, ORALLY DISINTEGRATING ORAL at 08:48

## 2018-08-26 RX ADMIN — CLONIDINE HYDROCHLORIDE 0.1 MG: 0.1 TABLET ORAL at 13:00

## 2018-08-26 RX ADMIN — NICOTINE 1 PATCH: 21 PATCH, EXTENDED RELEASE TRANSDERMAL at 08:40

## 2018-08-26 RX ADMIN — AMANTADINE 100 MG: 100 CAPSULE ORAL at 08:40

## 2018-08-26 RX ADMIN — Medication 1 G: at 20:36

## 2018-08-26 RX ADMIN — VITAMIN D, TAB 1000IU (100/BT) 2000 UNITS: 25 TAB at 08:39

## 2018-08-26 RX ADMIN — VITAMIN A, VITAMIN C, VITAMIN D-3, VITAMIN E, VITAMIN B-1, VITAMIN B-2, NIACIN, VITAMIN B-6, CALCIUM, IRON, ZINC, COPPER 1 TABLET: 4000; 120; 400; 22; 1.84; 3; 20; 10; 1; 12; 200; 27; 25; 2 TABLET ORAL at 08:40

## 2018-08-26 RX ADMIN — CEPHALEXIN 250 MG: 250 CAPSULE ORAL at 06:12

## 2018-08-26 RX ADMIN — NICOTINE POLACRILEX 4 MG: 2 GUM, CHEWING ORAL at 06:14

## 2018-08-26 RX ADMIN — CELECOXIB 200 MG: 100 CAPSULE ORAL at 08:40

## 2018-08-26 RX ADMIN — CLONIDINE HYDROCHLORIDE 0.1 MG: 0.1 TABLET ORAL at 08:38

## 2018-08-26 RX ADMIN — LAMOTRIGINE 25 MG: 25 TABLET ORAL at 08:38

## 2018-08-26 RX ADMIN — Medication 1 G: at 08:38

## 2018-08-26 RX ADMIN — GABAPENTIN 600 MG: 300 CAPSULE ORAL at 20:36

## 2018-08-26 RX ADMIN — NICOTINE POLACRILEX 4 MG: 2 GUM, CHEWING ORAL at 13:00

## 2018-08-26 RX ADMIN — CYANOCOBALAMIN TAB 1000 MCG 1000 MCG: 1000 TAB at 08:39

## 2018-08-26 RX ADMIN — NICOTINE POLACRILEX 4 MG: 2 GUM, CHEWING ORAL at 14:18

## 2018-08-26 RX ADMIN — AMANTADINE 100 MG: 100 CAPSULE ORAL at 20:35

## 2018-08-26 RX ADMIN — Medication 25 MG: at 08:40

## 2018-08-26 RX ADMIN — NICOTINE POLACRILEX 4 MG: 2 GUM, CHEWING ORAL at 16:08

## 2018-08-26 RX ADMIN — Medication 250 MG: at 08:40

## 2018-08-26 RX ADMIN — CEPHALEXIN 250 MG: 250 CAPSULE ORAL at 13:00

## 2018-08-26 RX ADMIN — LAMOTRIGINE 25 MG: 25 TABLET ORAL at 20:36

## 2018-08-26 RX ADMIN — RANITIDINE 150 MG: 150 TABLET ORAL at 08:40

## 2018-08-26 RX ADMIN — LORATADINE 10 MG: 10 TABLET ORAL at 08:38

## 2018-08-26 RX ADMIN — CEPHALEXIN 250 MG: 250 CAPSULE ORAL at 22:19

## 2018-08-26 RX ADMIN — CELECOXIB 200 MG: 100 CAPSULE ORAL at 20:36

## 2018-08-26 RX ADMIN — MAGNESIUM HYDROXIDE 30 ML: 400 SUSPENSION ORAL at 06:39

## 2018-08-26 RX ADMIN — GABAPENTIN 600 MG: 300 CAPSULE ORAL at 08:39

## 2018-08-26 RX ADMIN — GABAPENTIN 600 MG: 300 CAPSULE ORAL at 12:55

## 2018-08-26 RX ADMIN — Medication 250 MG: at 20:35

## 2018-08-26 ASSESSMENT — ACTIVITIES OF DAILY LIVING (ADL)
DRESS: SCRUBS (BEHAVIORAL HEALTH);INDEPENDENT
ORAL_HYGIENE: INDEPENDENT
GROOMING: INDEPENDENT
LAUNDRY: UNABLE TO COMPLETE
DRESS: INDEPENDENT;SCRUBS (BEHAVIORAL HEALTH)
GROOMING: INDEPENDENT

## 2018-08-26 NOTE — PLAN OF CARE
Problem: Patient Care Overview  Goal: Individualization & Mutuality  Patient will be compliant with treatment team recommendations.  Patient will report at least 6-8 hours of sleep each night.    8/21/18 Patient may keep wet wipes in her bathroom per patient care order for proper hygiene after chemo treatments.  Patient will attend groups.   Pt observed in bed and appears to be asleep most of NOC shift. Eyes closed and unlabored respirations noted. 15 minute safety and PRN checks done throughout the shift tonight with position changes noted. Will continue to monitor and document any changes.   0639: Pt has requested and received PRN milk of magnesium for c/o constipation. Pt slept about 6 hours last night.

## 2018-08-26 NOTE — PROGRESS NOTES
"[facility]  Psychiatric Progress Note    Subjective   This is a 28 year old female with schizoaffective disorder, bipolar type and polysubstance abuse.    Patient is very anxious today.  She is hyperverbal stating she \"came here for help and now forced to stay\".  Expresses a variety of concerns including the following: eczema, lyme's disease from tic bite as a kid, cellulitis of port site, discharge after final chemo treatment, fear of medical procedures, fear of reconstruction surgery for mastectomy, worries about needle going into port for Chemotherapy tomorrow, worries the TV is playing violent shows, concerns she is not seeing andrew enough (reports twice yesterday), feeling forced to take medications, not recalling conversation about medication changes, itchy scalp, dry skin, barrier cream use, etc. Discussed with patient calming strategies for anxiety as presents with symptoms that appear manic such as flight of ideas and rapid speech. Patient reports increased agitation with Risperdal scheduled and agreeable to change to as needed but increase frequency. Did not want hydroxyzine increased \"that's for allergies\" and \"get a fuzzy head\" then \"maybe I need Claritin\".  Educated patient on medications and does report decreased anxiety with hydroxyzine. Finds talks with andrew soothing.  Stated scalp itchy and agreeable to order selsun blue. Will get Aquaphor for dry itchy skin.  Discussed other self soothe techniques patient can utilize such as essential oils. Plans to attend groups. Patient denies suicidal or self harm ideation. Does have intense anxiety with racing thoughts but appears to respond well to 1:1 visits with others. Discussed with patient need to have grief/support group for breast cancer, will see what is offered at [facility].     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          Medications:       amantadine  100 mg Oral BID     celecoxib  200 mg Oral BID     cephalexin  250 mg Oral Q8H MICHAEL     cholecalciferol  2,000 Units Oral Daily     cloNIDine  0.1 mg Oral TID     cyanocobalamin  1,000 mcg Oral Daily     fish oil-omega-3 fatty acids  1 g Oral BID     gabapentin  600 mg Oral 4x Daily     lamoTRIgine (LaMICtal) tablet 25 mg  25 mg Oral BID     loratadine  10 mg Oral Daily     nicotine  1 patch Transdermal Daily     nicotine   Transdermal Q8H     nicotine   Transdermal Daily     PNV PRENATAL PLUS MULTIVITAMIN  1 tablet Oral Daily     pyridOXINE  25 mg Oral Daily     ranitidine  150 mg Oral BID     risperiDONE  1 mg Sublingual BID     risperiDONE microspheres  50 mg Intramuscular Q14 Days     saccharomyces boulardii  250 mg Oral BID     acetaminophen, alum & mag hydroxide-simethicone, amantadine, diphenhydrAMINE, gabapentin, hydrOXYzine (VISTARIL) capsule  mg, LORazepam, magnesium hydroxide, nicotine polacrilex, risperiDONE          Allergies:     Allergies   Allergen Reactions     Azithromycin Anaphylaxis     Abilify [Aripiprazole] Unknown     Wellbutrin [Bupropion] Other (See Comments)     suicidal     Zithromax [Azithromycin Dihydrate]           Psychiatric Examination:   /64  Pulse 97  Temp 99.2  F (37.3  C) (Tympanic)  Resp 16  Ht 1.651 m (5' 5\")  Wt 99.6 kg (219 lb 8 oz)  SpO2 98%  BMI 36.53 kg/m2  Weight is 219 lbs 8 oz  Body mass index is 36.53 kg/(m^2).    Appearance:  awake, alert, adequately groomed and dressed in hospital scrubs  Attitude:  somewhat cooperative  Eye Contact:  good  Mood:  anxious and " agitated  Affect:  mood congruent, intensity is heightened and intensity is dramatic  Speech:  clear, coherent   Psychomotor Behavior:  no evidence of tardive dyskinesia, dystonia, or tics and intact station, gait and muscle tone  Thought Process:  disorganized and tangental  Associations:  loosening of associations present  Thought Content:  no evidence of suicidal ideation or homicidal ideation  Insight:  fair  Judgment:  fair  Oriented to:  time, person, and place  Attention Span and Concentration:  fair  Recent and Remote Memory:  fair  Fund of Knowledge: appropriate  Muscle Strength and Tone: normal  Gait and Station: Normal  Perception: no perceptual disturbance noted         Labs:   No results found for this or any previous visit (from the past 24 hour(s)).        Assessment/ Plan:   Continue Inpatient Hospitalization  Continue to provide a safe environment and therapeutic milieu.  Change Risperdal M tabs 1 mg from scheduled to increased prn dose as patient reports increased agitation with scheduled doses. Next Consta due next week.  Lamictal increased 8/21/18, consider increase 8/28  Was started on short duration of keflex due to potential infection at port site, review notes by Dr. Kulkarni  Ordered selsun blue for shampoo and Aquaphor lotion for dry skin.    ELOS:  >5 days due to mood stability and completion of cancer infusions on 8/27/18

## 2018-08-26 NOTE — PLAN OF CARE
Face to face end of shift report received from MITCHELL Black. Rounding completed. Patient observed.     Salyl Emanuel  8/26/2018  3:54 PM

## 2018-08-26 NOTE — PROGRESS NOTES
MIGUEL A ALBERTO  Patient visit during  rounds. Patient along with one other requested Holy Communion which was given this afternoon. Patient continues to be hopeful that a transition to residence treatment will work out well in the coming days.

## 2018-08-26 NOTE — PLAN OF CARE
"Problem: Patient Care Overview  Goal: Individualization & Mutuality  Patient will be compliant with treatment team recommendations.  Patient will report at least 6-8 hours of sleep each night.    8/21/18 Patient may keep wet wipes in her bathroom per patient care order for proper hygiene after chemo treatments.  Patient will attend groups.   Pt quite labile and paranoid this afternoon, has been restless on unit and made multiple requests of staff. Pt wandered in and out of group with headphones on, requested alpha-stim therapy in lounge and took unit off after only few minutes. Easily agitated and made random verbal attack on staff at nurse's station. Pt was given PRN Vistaril with supper. Made vague c/o \"stomach issues\" and was given ginger ale with some relief. Does report continuing feeling of being \"foggy-headed.\" Continued monitoring of behavior,    Problem: Thought Process Alteration (Adult)  Goal: Improved Thought Process  Patient will hold reality based conversations prior to discharge.    In agreement, pt reports being hopeful.      "

## 2018-08-26 NOTE — PLAN OF CARE
Face to face end of shift report received from MITCHELL Flynn. Rounding completed. Patient observed in lounge area.     Sofia Milton  8/26/2018  7:40 AM

## 2018-08-26 NOTE — PLAN OF CARE
Face to face end of shift report received from Sally BOOTHE RN. Rounding completed. Patient observed.     Rina Lunsford  8/26/2018  12:26 AM

## 2018-08-26 NOTE — PROGRESS NOTES
[facility]    Hospitalist Progress Note    Date of Service (when I saw the patient): 08/26/2018    Assessment & Plan   Marti Javier is a 28 year old  woman who was admitted on 8/6/2018. I was asked by Toya Wolf to see the patient for evaluation of her implanted port     1.  Implanted port condition  Overall skin over implanted port appears excellent condition.  No erythema.  No warmth.  Minimal tenderness on palpation which is not unexpected. No vance-pocket fluid. Doubt infectious process but reasonable to continue course of Keflex already prescribed. Discussed with patient and she seems quite satisfied with this plan.    No leukocytosis.  Pro-calcitonin below detectable levels and certainly this argues he had any significant infection although certainly cellulitis may not result in any systemic inflammatory response.   Blood cultures were obtained and unrevealing.      Principal Problem:    Schizoaffective disorder (H)  Active Problems:    Manic behavior (H)    Malignant neoplasm of upper-outer quadrant of left breast in female, estrogen receptor positive (H)    Thank you for asking us to participate in the care of this patient.  We will sign off for now.  Please call with any questions or concerns.    Oracio Nam    Interval History   Awake, alert. Expansive mood.    -Data reviewed today: I reviewed all new labs and imaging results over the last 24 hours.    Physical Exam   Temp: 98.6  F (37  C) Temp src: Tympanic BP: 130/95 Pulse: 97   Resp: 16 SpO2: 98 % O2 Device: None (Room air)    Vitals:    08/12/18 0700 08/19/18 1059 08/26/18 1300   Weight: 98.1 kg (216 lb 4.8 oz) 99.6 kg (219 lb 8 oz) 100.2 kg (220 lb 14.4 oz)     Vital Signs with Ranges  Temp:  [98.6  F (37  C)-99.2  F (37.3  C)] 98.6  F (37  C)  Pulse:  [97] 97  Resp:  [16] 16  BP: (118-130)/(64-95) 130/95  SpO2:  [98 %] 98 %       Awake, alert  HEENT: Pupils equal, conjugate. No icterus or nystagmus. Oral mucosa moist. No  facial asymmetry.   Neck: Supple, jugular veins not elevated. Trachea midline   Chest: Port site without erythema or warmth. Minimal tenderness. No fluid accumulation. No chest wall movement asymmetry. Aeration preserved to bases. Accessory muscles not in use. Expiratory time not increased. No tidal wheezes. No rhonchi. No discrete crackles.   Cardiac: PMI not displaced. S1, S2 unremarkable. No S3, S4. P2 not accentuated. No murmurs.   Abdomen: Soft. No palpation or percussion tenderness. No distention. Normoactive bowel sounds. Liver and spleen not increased in size. No bruits, masses, or pulsations.     Medications       amantadine  100 mg Oral BID     celecoxib  200 mg Oral BID     cephalexin  250 mg Oral Q8H MICHAEL     cholecalciferol  2,000 Units Oral Daily     cloNIDine  0.1 mg Oral TID     cyanocobalamin  1,000 mcg Oral Daily     fish oil-omega-3 fatty acids  1 g Oral BID     gabapentin  600 mg Oral 4x Daily     lamoTRIgine (LaMICtal) tablet 25 mg  25 mg Oral BID     loratadine  10 mg Oral Daily     nicotine  1 patch Transdermal Daily     nicotine   Transdermal Q8H     nicotine   Transdermal Daily     PNV PRENATAL PLUS MULTIVITAMIN  1 tablet Oral Daily     pyridOXINE  25 mg Oral Daily     ranitidine  150 mg Oral BID     risperiDONE microspheres  50 mg Intramuscular Q14 Days     saccharomyces boulardii  250 mg Oral BID           Data     Recent Labs  Lab 08/24/18  1151 08/20/18  1100   WBC 9.5 8.6   HGB 11.1* 11.1*   MCV 83 82    312    140   POTASSIUM 4.0 4.1   CHLORIDE 106 108   CO2 23 26   BUN 11 12   CR 0.46* 0.35*   ANIONGAP 9 6   SONDRA 8.8 8.9   GLC 97 88   ALBUMIN 3.6 3.5   PROTTOTAL 7.4 7.0   BILITOTAL 0.4 0.2   ALKPHOS 96 100   ALT 25 28   AST 10 13            No results found for this or any previous visit (from the past 24 hour(s)).

## 2018-08-26 NOTE — PLAN OF CARE
"1644: Pt came to nurses station, pointing at this writer, in raised tone of voice, fast speech, agitated, tense, appearance, I can not trust you, you put off bad vibes, I'm frustrated et about to snap at any given time!! Stay away from me!!!!\" \"I don't trust you!!\" Pt then returned to room.   "

## 2018-08-26 NOTE — PHARMACY
[facility]    Pharmacy      Antimicrobial Stewardship Note     Current antimicrobial therapy:  Anti-infectives (Future)    Start     Dose/Rate Route Frequency Ordered Stop    08/24/18 1445  cephalexin (KEFLEX) capsule 250 mg      250 mg Oral EVERY 8 HOURS SCHEDULED 08/24/18 1441 08/29/18 1359          Indication: SSTI    Days of Therapy: 3     Pertinent labs:  Creatinine   Creatinine   Date Value Ref Range Status   08/24/2018 0.46 (L) 0.52 - 1.04 mg/dL Final   08/20/2018 0.35 (L) 0.52 - 1.04 mg/dL Final   08/13/2018 0.44 (L) 0.52 - 1.04 mg/dL Final     WBC   WBC   Date Value Ref Range Status   08/24/2018 9.5 4.0 - 11.0 10e9/L Final   08/20/2018 8.6 4.0 - 11.0 10e9/L Final   08/13/2018 8.7 4.0 - 11.0 10e9/L Final     Procalcitonin   Procalcitonin   Date Value Ref Range Status   08/24/2018 <0.05 ng/ml Final     Comment:     <0.05 ng/ml  Normal  Recommendation: Very low risk of bacterial infection.   Discourage antibiotics unless strong clinical suspicion for serious infection.       CRP   CRP Inflammation   Date Value Ref Range Status   01/29/2016 8.2 (H) 0.0 - 8.0 mg/L Final       Culture Results:   Blood culture [[id number]] Collected: 08/24/18 1150      Order Status: Completed Lab Status: Preliminary result Updated: 08/26/18 0619     Specimen: Blood from [facility]       Specimen Description Blood      Culture Micro No growth after 2 days          Recommendations/Interventions:  1. No recommendations at this time      Kaycee Olmstead, [facility]  August 26, 2018

## 2018-08-26 NOTE — PLAN OF CARE
"Problem: Patient Care Overview  Goal: Individualization & Mutuality  Patient will be compliant with treatment team recommendations.  Patient will report at least 6-8 hours of sleep each night.    8/21/18 Patient may keep wet wipes in her bathroom per patient care order for proper hygiene after chemo treatments.  Patient will attend groups.   Outcome: Declining  Pt remains irritable and tense this shift. She is paranoid. Pt angry when given her AM meds, insisting that risperdal is PRN and not scheduled. Did explain to pt multiple times that she has both scheduled and PRN risperdal. Pt is unwilling to accept this. She states \"You contradicted yourself. You said it wasn't scheduled and then you said it was. What am I supposed to believe?\" Pt was again told that risperdal is scheduled and PRN. Pt refuses to take scheduled risperdal. Pt then decides that she will take med and does so reluctantly after stating \"I'm not taking it unless I am court ordered to.\" Pt insisting that \"one of my med is causing my hair to be dry and that's not okay.\" Did explain to pt that many things could be causing her hair to be dry including chemo, shampoo, etc. Pt insisting that dry hair is \"a side effect of my meds.\"  Pt makes several paranoid and delusional statements about this writer \"psychologically torturing me with the head phones.\" Pt also makes paranoid statements about the tv.     1300- Pt has calmed considerably since this AM. Used alpha stim earlier this shift. Was seen by hospitalist for redness around port. Per hospitalist, continue abx. Pt applied PRN aquaphor to dry skin on her face and chest. Mild redness is noted to R side of chest where port is per pt report.     Problem: Thought Process Alteration (Adult)  Goal: Improved Thought Process  Patient will hold reality based conversations prior to discharge.    Outcome: No Change  Pt is paranoid and delusional this shift.       "

## 2018-08-27 ENCOUNTER — ONCOLOGY VISIT (OUTPATIENT)
Dept: ONCOLOGY | Facility: OTHER | Age: 28
DRG: 885 | End: 2018-08-27
Attending: INTERNAL MEDICINE
Payer: MEDICARE

## 2018-08-27 ENCOUNTER — OFFICE VISIT (OUTPATIENT)
Dept: SURGERY | Facility: OTHER | Age: 28
DRG: 885 | End: 2018-08-27
Attending: SURGERY
Payer: MEDICARE

## 2018-08-27 VITALS
DIASTOLIC BLOOD PRESSURE: 70 MMHG | TEMPERATURE: 97.8 F | HEART RATE: 111 BPM | BODY MASS INDEX: 36.49 KG/M2 | OXYGEN SATURATION: 95 % | SYSTOLIC BLOOD PRESSURE: 116 MMHG | HEIGHT: 65 IN | WEIGHT: 219 LBS

## 2018-08-27 VITALS
BODY MASS INDEX: 36.49 KG/M2 | TEMPERATURE: 97.8 F | WEIGHT: 219 LBS | HEIGHT: 65 IN | OXYGEN SATURATION: 95 % | DIASTOLIC BLOOD PRESSURE: 70 MMHG | SYSTOLIC BLOOD PRESSURE: 116 MMHG | HEART RATE: 111 BPM

## 2018-08-27 DIAGNOSIS — Z17.0 MALIGNANT NEOPLASM OF UPPER-OUTER QUADRANT OF LEFT BREAST IN FEMALE, ESTROGEN RECEPTOR POSITIVE (H): Primary | ICD-10-CM

## 2018-08-27 DIAGNOSIS — Z90.13 S/P PARTIAL MASTECTOMY, BILATERAL: Primary | ICD-10-CM

## 2018-08-27 DIAGNOSIS — C50.412 MALIGNANT NEOPLASM OF UPPER-OUTER QUADRANT OF LEFT BREAST IN FEMALE, ESTROGEN RECEPTOR POSITIVE (H): Primary | ICD-10-CM

## 2018-08-27 LAB
BASOPHILS # BLD AUTO: 0.1 10E9/L (ref 0–0.2)
BASOPHILS NFR BLD AUTO: 0.5 %
DIFFERENTIAL METHOD BLD: ABNORMAL
EOSINOPHIL # BLD AUTO: 0 10E9/L (ref 0–0.7)
EOSINOPHIL NFR BLD AUTO: 0 %
ERYTHROCYTE [DISTWIDTH] IN BLOOD BY AUTOMATED COUNT: 17.9 % (ref 10–15)
HCT VFR BLD AUTO: 32.2 % (ref 35–47)
HGB BLD-MCNC: 11 G/DL (ref 11.7–15.7)
IMM GRANULOCYTES # BLD: 0.1 10E9/L (ref 0–0.4)
IMM GRANULOCYTES NFR BLD: 0.6 %
LYMPHOCYTES # BLD AUTO: 2.2 10E9/L (ref 0.8–5.3)
LYMPHOCYTES NFR BLD AUTO: 23.5 %
MCH RBC QN AUTO: 28.4 PG (ref 26.5–33)
MCHC RBC AUTO-ENTMCNC: 34.2 G/DL (ref 31.5–36.5)
MCV RBC AUTO: 83 FL (ref 78–100)
MONOCYTES # BLD AUTO: 0.6 10E9/L (ref 0–1.3)
MONOCYTES NFR BLD AUTO: 6.7 %
NEUTROPHILS # BLD AUTO: 6.5 10E9/L (ref 1.6–8.3)
NEUTROPHILS NFR BLD AUTO: 68.7 %
NRBC # BLD AUTO: 0 10*3/UL
NRBC BLD AUTO-RTO: 0 /100
PLATELET # BLD AUTO: 319 10E9/L (ref 150–450)
RBC # BLD AUTO: 3.88 10E12/L (ref 3.8–5.2)
WBC # BLD AUTO: 9.4 10E9/L (ref 4–11)

## 2018-08-27 PROCEDURE — 87205 SMEAR GRAM STAIN: CPT | Performed by: SURGERY

## 2018-08-27 PROCEDURE — 36415 COLL VENOUS BLD VENIPUNCTURE: CPT | Mod: ZL | Performed by: INTERNAL MEDICINE

## 2018-08-27 PROCEDURE — 25000132 ZZH RX MED GY IP 250 OP 250 PS 637: Mod: GY | Performed by: NURSE PRACTITIONER

## 2018-08-27 PROCEDURE — 87186 SC STD MICRODIL/AGAR DIL: CPT | Performed by: SURGERY

## 2018-08-27 PROCEDURE — 99213 OFFICE O/P EST LOW 20 MIN: CPT | Performed by: INTERNAL MEDICINE

## 2018-08-27 PROCEDURE — 10160 PNXR ASPIR ABSC HMTMA BULLA: CPT

## 2018-08-27 PROCEDURE — 85025 COMPLETE CBC W/AUTO DIFF WBC: CPT | Mod: ZL | Performed by: INTERNAL MEDICINE

## 2018-08-27 PROCEDURE — 25000132 ZZH RX MED GY IP 250 OP 250 PS 637: Mod: GY | Performed by: INTERNAL MEDICINE

## 2018-08-27 PROCEDURE — 12400011

## 2018-08-27 PROCEDURE — 99232 SBSQ HOSP IP/OBS MODERATE 35: CPT | Performed by: NURSE PRACTITIONER

## 2018-08-27 PROCEDURE — 87077 CULTURE AEROBIC IDENTIFY: CPT | Performed by: SURGERY

## 2018-08-27 PROCEDURE — A9270 NON-COVERED ITEM OR SERVICE: HCPCS | Mod: GY | Performed by: NURSE PRACTITIONER

## 2018-08-27 PROCEDURE — G0463 HOSPITAL OUTPT CLINIC VISIT: HCPCS | Mod: 25,27

## 2018-08-27 PROCEDURE — 10140 I&D HMTMA SEROMA/FLUID COLLJ: CPT

## 2018-08-27 PROCEDURE — 10140 I&D HMTMA SEROMA/FLUID COLLJ: CPT | Performed by: SURGERY

## 2018-08-27 PROCEDURE — G0463 HOSPITAL OUTPT CLINIC VISIT: HCPCS | Mod: 25

## 2018-08-27 PROCEDURE — A9270 NON-COVERED ITEM OR SERVICE: HCPCS | Mod: GY | Performed by: INTERNAL MEDICINE

## 2018-08-27 PROCEDURE — 99203 OFFICE O/P NEW LOW 30 MIN: CPT | Mod: 25 | Performed by: SURGERY

## 2018-08-27 PROCEDURE — 87070 CULTURE OTHR SPECIMN AEROBIC: CPT | Performed by: SURGERY

## 2018-08-27 RX ORDER — LAMOTRIGINE 25 MG/1
50 TABLET ORAL 2 TIMES DAILY
Status: DISCONTINUED | OUTPATIENT
Start: 2018-08-28 | End: 2018-09-01

## 2018-08-27 RX ORDER — LANOLIN ALCOHOL/MO/W.PET/CERES
1000 CREAM (GRAM) TOPICAL DAILY
COMMUNITY

## 2018-08-27 RX ORDER — LAMOTRIGINE 100 MG/1
100 TABLET ORAL 2 TIMES DAILY
Status: DISCONTINUED | OUTPATIENT
Start: 2018-09-04 | End: 2018-09-06

## 2018-08-27 RX ADMIN — AMANTADINE 100 MG: 100 CAPSULE ORAL at 08:29

## 2018-08-27 RX ADMIN — CLONIDINE HYDROCHLORIDE 0.1 MG: 0.1 TABLET ORAL at 16:07

## 2018-08-27 RX ADMIN — Medication 1 G: at 08:28

## 2018-08-27 RX ADMIN — Medication 250 MG: at 08:56

## 2018-08-27 RX ADMIN — HYDROXYZINE PAMOATE 50 MG: 50 CAPSULE ORAL at 16:06

## 2018-08-27 RX ADMIN — NICOTINE POLACRILEX 4 MG: 2 GUM, CHEWING ORAL at 11:54

## 2018-08-27 RX ADMIN — RANITIDINE 150 MG: 150 TABLET ORAL at 08:29

## 2018-08-27 RX ADMIN — AMANTADINE 100 MG: 100 CAPSULE ORAL at 20:42

## 2018-08-27 RX ADMIN — CYANOCOBALAMIN TAB 1000 MCG 1000 MCG: 1000 TAB at 08:30

## 2018-08-27 RX ADMIN — LORAZEPAM 1 MG: 1 TABLET ORAL at 12:21

## 2018-08-27 RX ADMIN — LORAZEPAM 1 MG: 1 TABLET ORAL at 08:55

## 2018-08-27 RX ADMIN — WHITE PETROLATUM: 1.75 OINTMENT TOPICAL at 08:25

## 2018-08-27 RX ADMIN — Medication 1 G: at 20:42

## 2018-08-27 RX ADMIN — LAMOTRIGINE 25 MG: 25 TABLET ORAL at 08:29

## 2018-08-27 RX ADMIN — RISPERIDONE 1 MG: 1 TABLET, ORALLY DISINTEGRATING ORAL at 16:07

## 2018-08-27 RX ADMIN — GABAPENTIN 300 MG: 300 CAPSULE ORAL at 11:52

## 2018-08-27 RX ADMIN — CELECOXIB 200 MG: 100 CAPSULE ORAL at 08:25

## 2018-08-27 RX ADMIN — VITAMIN A, VITAMIN C, VITAMIN D-3, VITAMIN E, VITAMIN B-1, VITAMIN B-2, NIACIN, VITAMIN B-6, CALCIUM, IRON, ZINC, COPPER 1 TABLET: 4000; 120; 400; 22; 1.84; 3; 20; 10; 1; 12; 200; 27; 25; 2 TABLET ORAL at 08:31

## 2018-08-27 RX ADMIN — LORATADINE 10 MG: 10 TABLET ORAL at 08:30

## 2018-08-27 RX ADMIN — CLONIDINE HYDROCHLORIDE 0.1 MG: 0.1 TABLET ORAL at 08:30

## 2018-08-27 RX ADMIN — CELECOXIB 200 MG: 100 CAPSULE ORAL at 20:41

## 2018-08-27 RX ADMIN — CEPHALEXIN 250 MG: 250 CAPSULE ORAL at 05:15

## 2018-08-27 RX ADMIN — CEPHALEXIN 250 MG: 250 CAPSULE ORAL at 16:08

## 2018-08-27 RX ADMIN — GABAPENTIN 600 MG: 300 CAPSULE ORAL at 16:07

## 2018-08-27 RX ADMIN — CLONIDINE HYDROCHLORIDE 0.1 MG: 0.1 TABLET ORAL at 20:42

## 2018-08-27 RX ADMIN — NICOTINE 1 PATCH: 21 PATCH, EXTENDED RELEASE TRANSDERMAL at 08:26

## 2018-08-27 RX ADMIN — NICOTINE POLACRILEX 4 MG: 2 GUM, CHEWING ORAL at 16:08

## 2018-08-27 RX ADMIN — NICOTINE POLACRILEX 4 MG: 2 GUM, CHEWING ORAL at 07:49

## 2018-08-27 RX ADMIN — RANITIDINE 150 MG: 150 TABLET ORAL at 20:42

## 2018-08-27 RX ADMIN — NICOTINE POLACRILEX 4 MG: 2 GUM, CHEWING ORAL at 08:44

## 2018-08-27 RX ADMIN — VITAMIN D, TAB 1000IU (100/BT) 2000 UNITS: 25 TAB at 08:29

## 2018-08-27 RX ADMIN — ACETAMINOPHEN 650 MG: 325 TABLET, FILM COATED ORAL at 11:52

## 2018-08-27 RX ADMIN — NICOTINE POLACRILEX 4 MG: 2 GUM, CHEWING ORAL at 18:51

## 2018-08-27 RX ADMIN — GABAPENTIN 600 MG: 300 CAPSULE ORAL at 20:41

## 2018-08-27 RX ADMIN — GABAPENTIN 600 MG: 300 CAPSULE ORAL at 08:27

## 2018-08-27 RX ADMIN — RISPERIDONE 1 MG: 1 TABLET, ORALLY DISINTEGRATING ORAL at 08:45

## 2018-08-27 RX ADMIN — GABAPENTIN 600 MG: 300 CAPSULE ORAL at 12:21

## 2018-08-27 RX ADMIN — Medication 250 MG: at 20:42

## 2018-08-27 RX ADMIN — NICOTINE POLACRILEX 4 MG: 2 GUM, CHEWING ORAL at 05:15

## 2018-08-27 RX ADMIN — CEPHALEXIN 250 MG: 250 CAPSULE ORAL at 21:14

## 2018-08-27 RX ADMIN — NICOTINE POLACRILEX 4 MG: 2 GUM, CHEWING ORAL at 20:40

## 2018-08-27 RX ADMIN — Medication 25 MG: at 08:27

## 2018-08-27 RX ADMIN — HYDROXYZINE PAMOATE 100 MG: 50 CAPSULE ORAL at 11:52

## 2018-08-27 ASSESSMENT — ACTIVITIES OF DAILY LIVING (ADL)
GROOMING: INDEPENDENT
ORAL_HYGIENE: INDEPENDENT
DRESS: SCRUBS (BEHAVIORAL HEALTH)

## 2018-08-27 ASSESSMENT — PAIN SCALES - GENERAL
PAINLEVEL: EXTREME PAIN (8)
PAINLEVEL: EXTREME PAIN (8)

## 2018-08-27 NOTE — PLAN OF CARE
Problem: Patient Care Overview  Goal: Individualization & Mutuality  Patient will be compliant with treatment team recommendations.  Patient will report at least 6-8 hours of sleep each night.    8/21/18 Patient may keep wet wipes in her bathroom per patient care order for proper hygiene after chemo treatments.  Patient will attend groups.   Patient is unable to have a second scrub jacket as patient has been using jackets inappropriately.    Outcome: No Change  1610 Patient reports that she is going to just lose it if she doesn't get something for anxiety. Patient had woken up from her nap and needing something for anxiety. Patient given dose of antibiotic and catapress held due to her sleeping earlier. Patient given hydroxyzine 100 mg po and Risperdal 1 mg po for complaints of extreme anxiety. Patient pacing hallway but has a angry look on her face.    Problem: Thought Process Alteration (Adult)  Goal: Improved Thought Process  Patient will hold reality based conversations prior to discharge.    Outcome: Improving  Patient able to have a reality based conversation, at times exhibiting paranoia and angry confrontations with staff.

## 2018-08-27 NOTE — NURSING NOTE
"Chief Complaint   Patient presents with     RECHECK     follow up evaluation of port site       Initial /70 (BP Location: Right arm, Cuff Size: Adult Regular)  Pulse 111  Temp 97.8  F (36.6  C) (Tympanic)  Ht 1.651 m (5' 5\")  Wt 99.3 kg (219 lb)  SpO2 95%  BMI 36.44 kg/m2 Estimated body mass index is 36.44 kg/(m^2) as calculated from the following:    Height as of this encounter: 1.651 m (5' 5\").    Weight as of this encounter: 99.3 kg (219 lb).  Medication Reconciliation: complete    Britni Chaidez LPN  "

## 2018-08-27 NOTE — PLAN OF CARE
Problem: Patient Care Overview  Goal: Individualization & Mutuality  Patient will be compliant with treatment team recommendations.  Patient will report at least 6-8 hours of sleep each night.    8/21/18 Patient may keep wet wipes in her bathroom per patient care order for proper hygiene after chemo treatments.  Patient will attend groups.   Patient is unable to have a second scrub jacket as patient has been using jackets inappropriately.      Outcome: No Change  Patient has been up on the open unit since about 0315, walking the hallway and taking breaks to writer in journal. Patient was up at beginning of shift and requested a snack. Pt reports poor sleep. Declines any further intervention at this time. Slept about four hours thus far tonight. Will continue to monitor and document any changes.   0430: Pt has menses. Has been given sanitary products.   0553: Patient asked for extra scrub jackets as patient was cold. Writer explained to patient that she was inappropriately using the jackets the other day and wrapping them around her head and was only able to have one. Patient came out to the unge with a blanket. Writer explained to patient that she could not have blanket out in the open unit and it is a safety and infection concern. Pt got irritable with writer and asked for a grievance form. Did call and notify House Supervisor, Lorena. Pt did eventually calm down after a little bit.   0620: Pt slept about 4 hours.

## 2018-08-27 NOTE — PLAN OF CARE
Face to face end of shift report received from Chuy MOYA RN. Rounding completed. Patient observed.     Barby Arguelles  8/27/2018  4:34 PM

## 2018-08-27 NOTE — PHARMACY
[facility]    Pharmacy      Antimicrobial Stewardship Note     Current antimicrobial therapy:  Anti-infectives (Future)    Start     Dose/Rate Route Frequency Ordered Stop    08/24/18 1445  cephalexin (KEFLEX) capsule 250 mg      250 mg Oral EVERY 8 HOURS SCHEDULED 08/24/18 1441 08/29/18 1359          Indication: SSTI    Days of Therapy: 4     Pertinent labs:  Creatinine   Creatinine   Date Value Ref Range Status   08/24/2018 0.46 (L) 0.52 - 1.04 mg/dL Final   08/20/2018 0.35 (L) 0.52 - 1.04 mg/dL Final   08/13/2018 0.44 (L) 0.52 - 1.04 mg/dL Final     WBC   WBC   Date Value Ref Range Status   08/24/2018 9.5 4.0 - 11.0 10e9/L Final   08/20/2018 8.6 4.0 - 11.0 10e9/L Final   08/13/2018 8.7 4.0 - 11.0 10e9/L Final     Procalcitonin   Procalcitonin   Date Value Ref Range Status   08/24/2018 <0.05 ng/ml Final     Comment:     <0.05 ng/ml  Normal  Recommendation: Very low risk of bacterial infection.   Discourage antibiotics unless strong clinical suspicion for serious infection.       CRP   CRP Inflammation   Date Value Ref Range Status   01/29/2016 8.2 (H) 0.0 - 8.0 mg/L Final       Culture Results:    Blood culture [[id number]] Collected: 08/24/18 1150     Order Status: Completed Lab Status: Preliminary result Updated: 08/26/18 0619     Specimen: Blood from [facility]       Specimen Description Blood      Culture Micro No growth after 2 days          Recommendations/Interventions:  1. No recommendations at this time.    Kenn Perez [facility]  August 27, 2018

## 2018-08-27 NOTE — PLAN OF CARE
Problem: Patient Care Overview  Goal: Discharge Needs Assessment  Outcome: No Change  Spoke with court admin staff at [facility] regarding pt's extension hearing- Court admin states the  has not put in an order yet but once the decision is made they will fax over the order.     Received paper work from [facility]- pt's commitment has been extended until February 26th of 2019- Placed copy of paper work in pt's room as she is currently down for her treatment, informed pt's nurse and placed copy in pt's chart.     Spoke with pt's JORDON Adams this morning- Angie states she doesn't feel like pt is stable or ready for discharge at this point. Informed Angie the treatment team was in support of pt discharging back to her apartment with MH services in place or to the assisted living that was previously discussed. Angie states she has been thinking pt may need a [facility] prior to discharging back into the community. Angie states she is going to go over the records and think about the plan over night and will call staff back in the morning. Updated pt's nurse and NP.

## 2018-08-27 NOTE — PATIENT INSTRUCTIONS
Thank you for allowing Dr. Garces and our surgical team to participate in your care.  If you have a scheduling or an appointment question please contact Kamille, our Health Unit Coordinator at her direct line [phone].   ALL nursing questions or concerns can be directed to your surgical nurse at: [phone]

## 2018-08-27 NOTE — PLAN OF CARE
Face to face end of shift report received from Sally ULLOA RN. Rounding completed. Patient observed.     Rina Lunsford  8/26/2018  11:56 PM

## 2018-08-27 NOTE — PLAN OF CARE
Face to face end of shift report received from Denise RN. Rounding completed. Patient observed in dayroom and introduced to nursing for the shift    Chuy Samm  8/27/2018   [id number]

## 2018-08-27 NOTE — MR AVS SNAPSHOT
"              After Visit Summary   8/27/2018    Marti Javier    MRN: [id number]           Patient Information     Date Of Birth          1990        Visit Information        Provider Department      8/27/2018 10:00 AM Hernán García MD [facility]        Today's Diagnoses     Malignant neoplasm of upper-outer quadrant of left breast in female, estrogen receptor positive (H)    -  1       Follow-ups after your visit        Your next 10 appointments already scheduled     Sep 04, 2018 12:30 PM CDT   Level 4 with HC INF RM 3302   [facility] [place] ([facility] [place] )    [address]  [place] [address]   [phone]              Future tests that were ordered for you today     Open Future Orders        Priority Expected Expires Ordered    Wound Culture Aerobic Bacterial Routine  8/27/2019 8/27/2018    Gram stain Routine  8/27/2019 8/27/2018            Who to contact     If you have questions or need follow up information about today's clinic visit or your schedule please contact [facility] directly at [phone].  Normal or non-critical lab and imaging results will be communicated to you by MyChart, letter or phone within 4 business days after the clinic has received the results. If you do not hear from us within 7 days, please contact the clinic through [employer] or phone. If you have a critical or abnormal lab result, we will notify you by phone as soon as possible.  Submit refill requests through [employer] or call your pharmacy and they will forward the refill request to us. Please allow 3 business days for your refill to be completed.          Additional Information About Your Visit        MyChart Information     [employer] lets you send messages to your doctor, view your test results, renew your prescriptions, schedule appointments and more. To sign up, go to [email or web address] . Click on \"Log in\" on the left side of the screen, which will take " "you to the Welcome page. Then click on \"Sign up Now\" on the right side of the page.     You will be asked to enter the access code listed below, as well as some personal information. Please follow the directions to create your username and password.     Your access code is: [email or web address]  Expires: 2018 10:44 AM     Your access code will  in 90 days. If you need help or a new code, please call your [facility] clinic or [phone].        Care EveryWhere ID     This is your Care EveryWhere ID. This could be used by other organizations to access your [facility] medical records  [id number]        Your Vitals Were     Pulse Temperature Height Pulse Oximetry BMI (Body Mass Index)       111 97.8  F (36.6  C) (Tympanic) 1.651 m (5' 5\") 95% 36.44 kg/m2        Blood Pressure from Last 3 Encounters:   18 132/92   18 116/70   18 116/70    Weight from Last 3 Encounters:   18 100.2 kg (220 lb 14.4 oz)   18 99.3 kg (219 lb)   18 99.3 kg (219 lb)              We Performed the Following     CBC with platelets differential          Today's Medication Changes      Notice     This visit is during an admission. Changes to the med list made in this visit will be reflected in the After Visit Summary of the admission.             Primary Care Provider Fax #    Physician No Ref-Primary [phone]       No address on file        Equal Access to Services     [facility] : Hadii aad ku hadasho Sosariahali, waaxda luqadaha, qaybta kaalmada adeegyada, phill camargo . So [facility] [phone].    ATENCIÓN: Si habla español, tiene a downey disposición servicios gratuitos de asistencia lingüística. Llame al [phone].    We comply with applicable federal civil rights laws and [place] laws. We do not discriminate on the basis of race, color, national origin, age, disability, sex, sexual orientation, or gender identity.            Thank you!     [facility] you for [facility] [place]  [facility] your care. Our goal is always to provide you with excellent care. Hearing back from our patients is one way we can continue to improve our services. Please take a few minutes to complete the written survey that you may receive in the mail after your visit with us. Thank you!             Your Updated Medication List - Protect others around you: Learn how to safely use, store and throw away your medicines at www.disposemymeds.org.      Notice     This visit is during an admission. Changes to the med list made in this visit will be reflected in the After Visit Summary of the admission.

## 2018-08-27 NOTE — NURSING NOTE
"Chief Complaint   Patient presents with     Surgical Followup     possible seroma at port site.       Initial /70  Pulse 111  Temp 97.8  F (36.6  C)  Ht 5' 5\" (1.651 m)  Wt 219 lb (99.3 kg)  SpO2 95%  BMI 36.44 kg/m2 Estimated body mass index is 36.44 kg/(m^2) as calculated from the following:    Height as of this encounter: 5' 5\" (1.651 m).    Weight as of this encounter: 219 lb (99.3 kg).  Medication Reconciliation: complete    MACY GUTIERREZ LPN    "

## 2018-08-27 NOTE — Clinical Note
Was able to aspirate 300cc fluid concerned for infection, will await culture results and follow clinically. Hope is that the cavity does not communicate with port.

## 2018-08-27 NOTE — PROGRESS NOTES
"[facility]  Psychiatric Progress Note    Subjective   This is a 28 year old female with schizoaffective disorder, bipolar type and polysubstance abuse.    Patient stated she feels like she has not control over her life and anxious about treatments today.  She utilizes multiple coping skills including self soothe, alpha Stim, talking with andrew, talking with staff, essential oils and attending groups.  Stated she is hopeful for discharge back to [facility] where she can follow with her own doctor, have social supports, attend support group and \"have a cigarette\". Patient denies suicidal or self harm ideation. Does have intense anxiety with racing thoughts at times that appears manic but is anxiety. Did discuss grief issues yesterday with regards to breast cancer and wants to attend outpatient support group for survivors.     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          Medications:       amantadine  100 mg Oral BID     celecoxib  200 mg Oral BID     cephalexin  250 mg Oral Q8H MICHAEL     cholecalciferol  2,000 Units Oral Daily     cloNIDine  0.1 mg Oral TID     cyanocobalamin  1,000 mcg Oral Daily     fish oil-omega-3 fatty acids  1 g Oral BID     gabapentin  600 mg Oral 4x Daily     lamoTRIgine (LaMICtal) tablet 25 mg  25 mg Oral BID     loratadine  10 mg Oral Daily     nicotine  1 patch Transdermal Daily     nicotine   Transdermal Q8H     nicotine   Transdermal Daily     PNV PRENATAL PLUS MULTIVITAMIN  1 tablet Oral Daily     " "pyridOXINE  25 mg Oral Daily     ranitidine  150 mg Oral BID     risperiDONE microspheres  50 mg Intramuscular Q14 Days     saccharomyces boulardii  250 mg Oral BID     acetaminophen, alum & mag hydroxide-simethicone, amantadine, diphenhydrAMINE, gabapentin, hydrOXYzine (VISTARIL) capsule  mg, LORazepam, magnesium hydroxide, mineral oil-hydrophilic petrolatum, nicotine polacrilex, risperiDONE, selenium sulfide          Allergies:     Allergies   Allergen Reactions     Azithromycin Anaphylaxis     Abilify [Aripiprazole] Unknown     Wellbutrin [Bupropion] Other (See Comments)     suicidal     Zithromax [Azithromycin Dihydrate]           Psychiatric Examination:   /92  Pulse 102  Temp 99.2  F (37.3  C) (Tympanic)  Resp 16  Ht 1.651 m (5' 5\")  Wt 100.2 kg (220 lb 14.4 oz)  SpO2 97%  BMI 36.76 kg/m2  Weight is 220 lbs 14.4 oz  Body mass index is 36.76 kg/(m^2).    Appearance:  awake, alert, adequately groomed and dressed in hospital scrubs  Attitude:  somewhat cooperative  Eye Contact:  good  Mood:  anxious and agitated  Affect:  mood congruent, intensity is heightened and intensity is dramatic  Speech:  clear, coherent   Psychomotor Behavior:  no evidence of tardive dyskinesia, dystonia, or tics and intact station, gait and muscle tone  Thought Process:  disorganized and tangental  Associations:  loosening of associations present  Thought Content:  no evidence of suicidal ideation or homicidal ideation  Insight:  fair  Judgment:  fair  Oriented to:  time, person, and place  Attention Span and Concentration:  fair  Recent and Remote Memory:  fair  Fund of Knowledge: appropriate  Muscle Strength and Tone: normal  Gait and Station: Normal  Perception: no perceptual disturbance noted         Labs:     Recent Results (from the past 24 hour(s))   CBC with platelets differential    Collection Time: 08/27/18 11:04 AM   Result Value Ref Range    WBC 9.4 4.0 - 11.0 10e9/L    RBC Count 3.88 3.8 - 5.2 10e12/L    " Hemoglobin 11.0 (L) 11.7 - 15.7 g/dL    Hematocrit 32.2 (L) 35.0 - 47.0 %    MCV 83 78 - 100 fl    MCH 28.4 26.5 - 33.0 pg    MCHC 34.2 31.5 - 36.5 g/dL    RDW 17.9 (H) 10.0 - 15.0 %    Platelet Count 319 150 - 450 10e9/L    Diff Method Automated Method     % Neutrophils 68.7 %    % Lymphocytes 23.5 %    % Monocytes 6.7 %    % Eosinophils 0.0 %    % Basophils 0.5 %    % Immature Granulocytes 0.6 %    Nucleated RBCs 0 0 /100    Absolute Neutrophil 6.5 1.6 - 8.3 10e9/L    Absolute Lymphocytes 2.2 0.8 - 5.3 10e9/L    Absolute Monocytes 0.6 0.0 - 1.3 10e9/L    Absolute Eosinophils 0.0 0.0 - 0.7 10e9/L    Absolute Basophils 0.1 0.0 - 0.2 10e9/L    Abs Immature Granulocytes 0.1 0 - 0.4 10e9/L    Absolute Nucleated RBC 0.0          Assessment/ Plan:   Continue Inpatient Hospitalization  Continue to provide a safe environment and therapeutic milieu.  Change Risperdal M tabs 1 mg from scheduled to increased prn dose as patient reports increased agitation with scheduled doses. Next Consta due next week.  Lamictal to be increased 8/28 and 9/4    Unable to have infusion for CA today due to fluid around port that was drained.     ELOS:  >5 days due to completion of cancer infusions on 8/30/18  Discharge to safe, supportive environment. Recommend close to [facility] so can continue Cancer treatments along with support groups for low anxiety.

## 2018-08-27 NOTE — PLAN OF CARE
Problem: Patient Care Overview  Goal: Individualization & Mutuality  Patient will be compliant with treatment team recommendations.  Patient will report at least 6-8 hours of sleep each night.    8/21/18 Patient may keep wet wipes in her bathroom per patient care order for proper hygiene after chemo treatments.  Patient will attend groups.   Patient is unable to have a second scrub jacket as patient has been using jackets inappropriately.    Outcome: Improving  Pt was very anxious before her appointment with oncology.  She asked for and was given Ativan 1 mg and Risperdal 1 mg at 0900.  When she came back from her appointment she was upset and frightened about having about a 500 ml drawn off near her port.  She looked at the copy of the commitment extension court papers that were left in her room.  She be came very upset and emotional yelling at the  for looking in her room and complained in the dayroom about the  select meal she received.  Pt did respond to calm presence and soft re-direction.  She asked for PRN's and I gave her 100 mg of Atarax along with 300 mg of Gabapentin PRN at 1145.  At 1219 I also gave her the second dose of PRN Ativan 1 mg. She did calm down after I talked with her for about a half hour.  She says that she feels very hopeless, powerless, and that he life feels like it has no meaning. She told me that she feels like everything in her life is out of her control. She is worried about dying and feels like sitting in a psych ceja inpatient is not how she wants to spend her life.  She cried in sobs explaining to me that she would like some individual therapy. I did post the andrew to see her.  She did fall asleep about an hour later and has slept until shift change.    Problem: Thought Process Alteration (Adult)  Goal: Identify Related Risk Factors and Signs and Symptoms  Patient will be compliant with medications and treatment team recommendations while on unit.    Outcome: Improving  Pt  has taken all medications and asked for PRN's when needed  Goal: Improved Thought Process  Patient will hold reality based conversations prior to discharge.    Outcome: Improving  Pt was have to have a reality based conversation.

## 2018-08-27 NOTE — PROGRESS NOTES
Visit Date:   08/27/2018      HISTORY OF PRESENT ILLNESS:  Ms. Javier returns for followup of breast cancer.  We had seen her in consultation at the request of Dr. Parth Villalba MD from [facility] in [place], as well as [place], [place], as well as Kusum Cota, a nurse practitioner.  We had seen the patient on 08/13/2018.  At that time, she was a 28-year-old white female with history of attention deficit hyperactivity disorder, schizoaffective disorder who we were asked to evaluate concerning diagnosis of clinical stage II multifocal invasive ductal carcinoma of the left breast.  According to the patient, she had palpated a lump in her left breast which led to a diagnostic mammogram on 11/29/2017 that revealed there to be adjacent nodules that were suspicious.  Ultrasound on 11/29/2017 revealed a 1.8 cm nodule 3 o'clock position, 8 mm nodule in 3:30 position, a 1.5 cm nodule at the 4 o'clock position of the left breast.  Ultrasound-guided biopsy was performed on 12/06/2017 that revealed 3 abnormal axillary nodes which were biopsied.  Pathology revealed invasive ductal carcinoma, grade 3 of the 2:30 specimen and 3:30 specimen.  The 4 o'clock specimen was DCIS.  Lymph node also revealed metastatic carcinoma.  ER was positive at 91%, PA was positive at 81%.  HER-2/ángel was negative by FISH.  PET scan was completed and revealed no evidence of distant metastatic disease.  The patient was seen by Dr. Parth Villalba at [place] and was started on dose-dense Adriamycin, Cytoxan beginning 01/04/2018 as part of a neoadjuvant protocol.  The patient completed 3 cycles of AC at [place] and then went to a fourth one given in [place], [place], where she was committed to the [facility].  It was decided to go ahead to surgery.  The patient had the surgery.  The patient wanted immediate reconstruction, but due to her smoking, this precluded this procedure, and, therefore, the patient  underwent bilateral mastectomies on 04/12/2018, which was performed in [place], [place], under the direction of the [facility].  Pathology revealed that the right simple mastectomy revealed proliferative fibrocystic changes and microcalcifications in the left breast.  A simple mastectomy revealed invasive ductal carcinoma 6 mm.  There were 3 foci of invasive carcinoma identified across the tumor bed.  There was high nuclear grade DCIS present.  Margins were uninvolved by invasive carcinoma.  The left axillary node dissection revealed 7 lymph nodes that were examined:  One revealed isolated tumor cells, no lymphovascular invasion.  It was decided to proceed with weekly Taxol under the direction of Dr. Parth Villalba as the patient had residual disease.  The patient had received 9 cycles of weekly paclitaxel with a dose of 80 mg per meter squared.  Once she was readmitted to [facility] here in [place], [place], as there were no beds available at [place], the patient was committed.  We did discuss the case with Dr. Villalba, and the plan was to proceed with Taxol 80 mg/m2 weekly to complete a total of 12 weekly cycles.  The patient received her 10th weekly dose on 08/13 and then had her second weekly dose on 08/20.  In the interim, she was evaluated by Dr. Pollack as an inpatient on 08/24 for swelling around the port site.  He felt the patient may have a cellulitis and started her on Keflex 250 mg t.i.d. to complete a 5-day course.  He saw her again yesterday and felt that she was doing better, was not convinced that she was infected, but nonetheless recommended continuing antibiotics.  The patient comes in today stating she feels that the mastectomy site is swollen, and she would like to have a port removed.  She continues on antibiotics.  She is due for her 12th weekly dose of Taxol.      PHYSICAL EXAMINATION:   GENERAL:  She is a middle-aged white female in no acute distress.   VITAL SIGNS:  Reveal blood  pressure is 116/70, pulse 111, temperature 97.8.   HEENT:  Atraumatic, normocephalic.  Oropharynx nonerythematous.   NECK:  Supple.   LUNGS:  Clear to auscultation and percussion.   HEART:  Regular rhythm, S1 is normal limits.   CHEST:  Reveals she has bilateral mastectomy scars.  The right mastectomy appears to be more erythematous and fluid-filled with likely a seroma.   ABDOMEN:  Soft, nontender.   NEUROLOGIC:  Grossly nonfocal.      LABORATORY STUDIES:  Reveal procalcitonin level of less than 0.05.  BUN 11, creatinine 0.46, LFTs are normal.  CBC reveals white count of 9.5, H and H 11.1 and 33.0, platelet count is 296.      IMPRESSION:  Clinical stage II multifocal invasive ductal carcinoma of the left breast, ER positive, CT positive, HER-2/ángel negative, status post neoadjuvant dose-dense AC x 4 followed by bilateral mastectomies,With partial response to therapy as per Dr. Villalba.  The patient was a candidate for weekly Taxol.  She received 9 weekly cycles of Taxol at a dose of 80 mg/m2 and now has received 2 more cycles here.  She is due for her 12th cycle, but now appears to have likely cellulitis with a right chest wall seroma.  We will consult with Dr. Garces of General Surgery for possible aspiration of the seroma.  We will hold chemotherapy until she completes antibiotics and defer until next week.  Otherwise, we will see the patient afterwards; obtain CBC, CMP, LDH.      Forty minutes was spent with the patient, spent on counseling and coordinating care.         MICHAEL PALM MD             D: 2018   T: 2018   MT: JOSSIE      Name:     MIGUEL A ALBERTO   MRN:      [id number]        Account:      [id number]   :      1990           Visit Date:   2018      Document: [id number]       cc: Parth Cota NP

## 2018-08-27 NOTE — MR AVS SNAPSHOT
After Visit Summary   8/27/2018    Marti Javier    MRN: [id number]           Patient Information     Date Of Birth          1990        Visit Information        Provider Department      8/27/2018 11:15 AM [facility] Garces [facility]        Today's Diagnoses     Fluid collection at surgical site    -  1      Care Instructions    Thank you for allowing Dr. Garces and our surgical team to participate in your care.  If you have a scheduling or an appointment question please contact Kamille, our Health Unit Coordinator at her direct line [phone].   ALL nursing questions or concerns can be directed to your surgical nurse at: [phone]          Follow-ups after your visit        Your next 10 appointments already scheduled     Sep 04, 2018 12:30 PM CDT   Level 4 with HC INF RM 3302   [facility] [place] ([facility] [place] )    [address]  [place] [address] [phone]              Future tests that were ordered for you today     Open Future Orders        Priority Expected Expires Ordered    Wound Culture Aerobic Bacterial Routine  8/27/2019 8/27/2018    Gram stain Routine  8/27/2019 8/27/2018            Who to contact     If you have questions or need follow up information about today's clinic visit or your schedule please contact [facility] directly at [phone].  Normal or non-critical lab and imaging results will be communicated to you by MyChart, letter or phone within 4 business days after the clinic has received the results. If you do not hear from us within 7 days, please contact the clinic through MyChart or phone. If you have a critical or abnormal lab result, we will notify you by phone as soon as possible.  Submit refill requests through [employer] or call your pharmacy and they will forward the refill request to us. Please allow 3 business days for your refill to be completed.          Additional Information About  "Your Visit        MyChart Information     [employer] lets you send messages to your doctor, view your test results, renew your prescriptions, schedule appointments and more. To sign up, go to [email or web address] . Click on \"Log in\" on the left side of the screen, which will take you to the Welcome page. Then click on \"Sign up Now\" on the right side of the page.     You will be asked to enter the access code listed below, as well as some personal information. Please follow the directions to create your username and password.     Your access code is: [email or web address]  Expires: 2018 10:44 AM     Your access code will  in 90 days. If you need help or a new code, please call your [facility] clinic or [phone].        Care EveryWhere ID     This is your Care EveryWhere ID. This could be used by other organizations to access your [facility] medical records  [id number]        Your Vitals Were     Pulse Temperature Height Pulse Oximetry BMI (Body Mass Index)       111 97.8  F (36.6  C) 5' 5\" (1.651 m) 95% 36.44 kg/m2        Blood Pressure from Last 3 Encounters:   18 116/70   18 116/70   18 113/77    Weight from Last 3 Encounters:   18 219 lb (99.3 kg)   18 219 lb (99.3 kg)   18 219 lb 9.3 oz (99.6 kg)                 Today's Medication Changes      Notice     This visit is during an admission. Changes to the med list made in this visit will be reflected in the After Visit Summary of the admission.             Primary Care Provider Fax #    Physician No Ref-Primary [phone]       No address on file        Equal Access to Services     SHARI JOHNSON : Milan Rodríguez, jah kay, geovanna uriarte, phill herrera. So [facility] [phone].    ATENCIÓN: Si habla español, tiene a downey disposición servicios gratuitos de asistencia lingüística. Llame al [phone].    We comply with applicable federal civil rights laws and [place] " laws. We do not discriminate on the basis of race, color, national origin, age, disability, sex, sexual orientation, or gender identity.            Thank you!     Thank you for choosing [facility]  for your care. Our goal is always to provide you with excellent care. Hearing back from our patients is one way we can continue to improve our services. Please take a few minutes to complete the written survey that you may receive in the mail after your visit with us. Thank you!             Your Updated Medication List - Protect others around you: Learn how to safely use, store and throw away your medicines at www.disposemymeds.org.      Notice     This visit is during an admission. Changes to the med list made in this visit will be reflected in the After Visit Summary of the admission.

## 2018-08-27 NOTE — PLAN OF CARE
Problem: Patient Care Overview  Goal: Team Discussion  Team Plan:   BEHAVIORAL TEAM DISCUSSION    Participants: Michael Aranda NP, Toya Wolf NP, Renetta Quinteros LSW,  Geno Hargrove LSW, Edie Eugene RN, Chuy Quijano RN, Janice Garcia Recreation Therapy, Haily Fuchs OT  Progress: fair, anxious  Continued Stay Criteria/Rationale: scheduled risperdal  Medical/Physical: active breast cancer - chemo, LE edema  Precautions:   Behavioral Orders   Procedures     Code 1 - Restrict to Unit     Routine Programming     As clinically indicated     Status 15     Every 15 minutes.     Plan: awaiting judges decision, DC to either home or adult foster care with services in place  Rationale for change in precautions or plan: None

## 2018-08-27 NOTE — PROGRESS NOTES
[facility] Surgery Consultation    CC:  Right breast swelling, redness    HPI:  This 28 year old year old female is seen at the request of Dr. García for evaluation of right mastectomy site swelling erythema, concern for port site infection. She was evaluated by Internal medicine on 8/24 where blood cultures and labs were drawn and was started on antibiotics. She was seen in Oncology clinic this am for concerns of worsening swelling and redness overlying the right breast. She has been hospitalized in the past for cellulitis of the left side. She is currently undergoing chemotherapy for stager II invasive ductal carcinoma of the left breast. She had her mastectomy and port placement in [place], [place]. She is otherwise feeling well other than some increased tenderness and swelling of the right breast. She is currently admitted to the psychiatric ceja of the hospital. Chemotherapy was postponed for today.       Past Medical History:   Diagnosis Date     Illicit drug use     + THC, counseled 10/9/12. SS referral. UDS on Admit.     Insomnia  8/29/2012     Malignant neoplasm of upper-outer quadrant of left breast in female, estrogen receptor positive (H) 8/10/2018     Obesity      Positive urine drug screen 11/10/2014    positive for THC and positive for THC and amphetamines 10/2013     Psychosis 1/19/2015     Schizoaffective disorder, bipolar type 8/29/2012    bipolar type with psychotic features; inpatient 11/2014 to stepdown at [facility] 8/6/2012     Substance abuse      Tobacco abuse      Uncomplicated asthma        Past Surgical History:   Procedure Laterality Date     genitle warts removed       PE TUBES         Allergies   Allergen Reactions     Azithromycin Anaphylaxis     Abilify [Aripiprazole] Unknown     Wellbutrin [Bupropion] Other (See Comments)     suicidal     Zithromax [Azithromycin Dihydrate]        No current facility-administered medications for this visit.      No current outpatient  prescriptions on file.     Facility-Administered Medications Ordered in Other Visits   Medication     acetaminophen (TYLENOL) tablet 650 mg     alum & mag hydroxide-simethicone (MYLANTA ES/MAALOX  ES) suspension 30 mL     amantadine (SYMMETREL) capsule 100 mg     amantadine (SYMMETREL) capsule 100 mg     celecoxib (celeBREX) capsule 200 mg     cephalexin (KEFLEX) capsule 250 mg     cholecalciferol (vitamin D3) tablet 2,000 Units     cloNIDine (CATAPRES) tablet 0.1 mg     cyanocobalamin (vitamin  B-12) tablet 1,000 mcg     diphenhydrAMINE (BENADRYL) capsule 50 mg     fish oil-omega-3 fatty acids capsule 1 g     gabapentin (NEURONTIN) capsule 300 mg     gabapentin (NEURONTIN) capsule 600 mg     hydrOXYzine (VISTARIL) capsule  mg     lamoTRIgine (LaMICtal) tablet 25 mg     loratadine (CLARITIN) tablet 10 mg     LORazepam (ATIVAN) tablet 1 mg     magnesium hydroxide (MILK OF MAGNESIA) suspension 30 mL     mineral oil-hydrophilic petrolatum (AQUAPHOR)     nicotine (NICODERM CQ) 21 MG/24HR 24 hr patch 1 patch     nicotine Patch in Place     nicotine patch REMOVAL     nicotine polacrilex (NICORETTE) gum 2-4 mg     PNV PRENATAL PLUS MULTIVITAMIN per tablet 1 tablet     pyridOXINE (vitamin B-6) tablet 25 mg     ranitidine (ZANTAC) tablet 150 mg     risperiDONE (risperDAL M-TABS) ODT tab 1 mg     risperiDONE microspheres (risperDAL CONSTA) injection 50 mg     saccharomyces boulardii (FLORASTOR) capsule 250 mg     selenium sulfide (SELSUN) 1 % lotion       HABITS:    Social History   Substance Use Topics     Smoking status: Current Every Day Smoker     Packs/day: 2.00     Years: 10.00     Types: Cigarettes     Smokeless tobacco: Never Used     Alcohol use No      Comment: in tx       Family History   Problem Relation Age of Onset     Depression Mother      Hypertension Mother      HEART DISEASE Maternal Grandmother      Diabetes Maternal Grandfather      HEART DISEASE Paternal Grandfather      heart attack      Unknown/Adopted Father      Asthma Father      HEART DISEASE Other      Cerebrovascular Disease Other      Thyroid Disease Brother      Asthma Sister        REVIEW OF SYSTEMS:  Ten point review of systems negative except those mentioned in the HPI.     OBJECTIVE:    There were no vitals taken for this visit.    GENERAL: Generally appears well, in no distress with appropriate affect.  HEENT:   Sclerae anicteric - normocephalic atraumatic   Respiratory:  No acute distress, no splinting   Cardiovascular:  Regular Rate and Rhythm  Breast: Right breast with erythema, edema and palpable fluid collection, left side well healed mastectomy scar.   :  deferred  Extremities:  Extremities normal. No deformities, edema, or skin discoloration.  Skin:  no suspicious lesions or rashes  Neurological: grossly intact  Psych:  Alert, oriented, affect appropriate with normal decision making ability.    IMPRESSION:    Right sided erythema and edema overlying the right mastectomy flap, there is fluid beneath it which is concerning for possible seroma, the port is in proximity to this but I donot believe it is a primary port issue as the tract is non-tender without erythema. It is possible that the port does communicate with the hub of the port. Would like to try and drain the fluid and send for culture. She is already on keflex, unclear the process Dr. Pollack was consulted for is improving or worsening. Discussed that if the fluid under the mastectomy flap is infected that may need operative drainage.     PLAN:    Continue keflex   Will attempt to drain any fluid collection and send for culture  Can follow progression as patient is admitted to 5th floor for psychiatric reasons    Murray Garces MD,     8/27/2018  10:58 AM      [facility] Surgery  Minor Procedure Note    Preoperative diagnosis:  Seroma     Postoperative diagnosis:  Concern for infected seroma     Procedure:  Aspiration of right breast seroma      Anesthesia:  Local    History: see above     Findings:  Milky yellow fluid aspirated, thin but cloudy aprox 300 ml of fluid.     Tissue to pathology:    Fluid sent for gram stain and culture.     Details:   The requisite time out pause observed during which the patient confirmed their identity, date of birth, side and site of the requested excision.  The region was prepped and draped sterilely; local anesthesia was obtained with infiltration of 3 cc of 1% lidocaine. Then taking an 18 gauge needle the fluctuant pocket was entered and aprox 300ml of murky yellow fluid was removed. A sterile dressing was placed over the needle hole. The procedure was well tolerated and the patient was discharged back to psych floor. Will await culture results and follow clinically.     Murray Garces

## 2018-08-28 LAB
GRAM STN SPEC: ABNORMAL
GRAM STN SPEC: ABNORMAL
SPECIMEN SOURCE: ABNORMAL

## 2018-08-28 PROCEDURE — A9270 NON-COVERED ITEM OR SERVICE: HCPCS | Mod: GY | Performed by: INTERNAL MEDICINE

## 2018-08-28 PROCEDURE — 25000132 ZZH RX MED GY IP 250 OP 250 PS 637: Mod: GY | Performed by: NURSE PRACTITIONER

## 2018-08-28 PROCEDURE — 99232 SBSQ HOSP IP/OBS MODERATE 35: CPT | Performed by: NURSE PRACTITIONER

## 2018-08-28 PROCEDURE — 12400011

## 2018-08-28 PROCEDURE — 40000133 ZZH STATISTIC OT WARD VISIT

## 2018-08-28 PROCEDURE — A9270 NON-COVERED ITEM OR SERVICE: HCPCS | Mod: GY | Performed by: NURSE PRACTITIONER

## 2018-08-28 PROCEDURE — 25000132 ZZH RX MED GY IP 250 OP 250 PS 637: Mod: GY | Performed by: INTERNAL MEDICINE

## 2018-08-28 PROCEDURE — 97530 THERAPEUTIC ACTIVITIES: CPT | Mod: GO

## 2018-08-28 RX ADMIN — WHITE PETROLATUM: 1.75 OINTMENT TOPICAL at 08:22

## 2018-08-28 RX ADMIN — LAMOTRIGINE 50 MG: 25 TABLET ORAL at 08:19

## 2018-08-28 RX ADMIN — CLONIDINE HYDROCHLORIDE 0.1 MG: 0.1 TABLET ORAL at 14:42

## 2018-08-28 RX ADMIN — Medication 25 MG: at 08:23

## 2018-08-28 RX ADMIN — GABAPENTIN 600 MG: 300 CAPSULE ORAL at 08:19

## 2018-08-28 RX ADMIN — LORAZEPAM 1 MG: 1 TABLET ORAL at 05:42

## 2018-08-28 RX ADMIN — RANITIDINE 150 MG: 150 TABLET ORAL at 21:03

## 2018-08-28 RX ADMIN — CLONIDINE HYDROCHLORIDE 0.1 MG: 0.1 TABLET ORAL at 21:04

## 2018-08-28 RX ADMIN — GABAPENTIN 600 MG: 300 CAPSULE ORAL at 17:23

## 2018-08-28 RX ADMIN — RANITIDINE 150 MG: 150 TABLET ORAL at 08:19

## 2018-08-28 RX ADMIN — CYANOCOBALAMIN TAB 1000 MCG 1000 MCG: 1000 TAB at 08:19

## 2018-08-28 RX ADMIN — NICOTINE POLACRILEX 4 MG: 2 GUM, CHEWING ORAL at 21:04

## 2018-08-28 RX ADMIN — CLONIDINE HYDROCHLORIDE 0.1 MG: 0.1 TABLET ORAL at 08:21

## 2018-08-28 RX ADMIN — NICOTINE 1 PATCH: 21 PATCH, EXTENDED RELEASE TRANSDERMAL at 08:31

## 2018-08-28 RX ADMIN — CEPHALEXIN 250 MG: 250 CAPSULE ORAL at 21:03

## 2018-08-28 RX ADMIN — CELECOXIB 200 MG: 100 CAPSULE ORAL at 08:19

## 2018-08-28 RX ADMIN — WHITE PETROLATUM: 1.75 OINTMENT TOPICAL at 16:50

## 2018-08-28 RX ADMIN — GABAPENTIN 600 MG: 300 CAPSULE ORAL at 21:04

## 2018-08-28 RX ADMIN — AMANTADINE 100 MG: 100 CAPSULE ORAL at 08:21

## 2018-08-28 RX ADMIN — NICOTINE POLACRILEX 4 MG: 2 GUM, CHEWING ORAL at 05:43

## 2018-08-28 RX ADMIN — NICOTINE POLACRILEX 4 MG: 2 GUM, CHEWING ORAL at 00:54

## 2018-08-28 RX ADMIN — NICOTINE POLACRILEX 4 MG: 2 GUM, CHEWING ORAL at 17:27

## 2018-08-28 RX ADMIN — Medication 250 MG: at 08:21

## 2018-08-28 RX ADMIN — CELECOXIB 200 MG: 100 CAPSULE ORAL at 21:04

## 2018-08-28 RX ADMIN — Medication 250 MG: at 21:03

## 2018-08-28 RX ADMIN — NICOTINE POLACRILEX 4 MG: 2 GUM, CHEWING ORAL at 14:42

## 2018-08-28 RX ADMIN — RISPERIDONE 1 MG: 1 TABLET, ORALLY DISINTEGRATING ORAL at 05:42

## 2018-08-28 RX ADMIN — Medication 1 G: at 08:19

## 2018-08-28 RX ADMIN — AMANTADINE 100 MG: 100 CAPSULE ORAL at 21:03

## 2018-08-28 RX ADMIN — CEPHALEXIN 250 MG: 250 CAPSULE ORAL at 05:42

## 2018-08-28 RX ADMIN — GABAPENTIN 600 MG: 300 CAPSULE ORAL at 12:50

## 2018-08-28 RX ADMIN — Medication 1 G: at 21:04

## 2018-08-28 RX ADMIN — CEPHALEXIN 250 MG: 250 CAPSULE ORAL at 14:42

## 2018-08-28 RX ADMIN — VITAMIN A, VITAMIN C, VITAMIN D-3, VITAMIN E, VITAMIN B-1, VITAMIN B-2, NIACIN, VITAMIN B-6, CALCIUM, IRON, ZINC, COPPER 1 TABLET: 4000; 120; 400; 22; 1.84; 3; 20; 10; 1; 12; 200; 27; 25; 2 TABLET ORAL at 08:20

## 2018-08-28 RX ADMIN — NICOTINE POLACRILEX 4 MG: 2 GUM, CHEWING ORAL at 08:22

## 2018-08-28 RX ADMIN — LAMOTRIGINE 50 MG: 25 TABLET ORAL at 21:04

## 2018-08-28 RX ADMIN — NICOTINE POLACRILEX 4 MG: 2 GUM, CHEWING ORAL at 12:50

## 2018-08-28 RX ADMIN — VITAMIN D, TAB 1000IU (100/BT) 2000 UNITS: 25 TAB at 08:20

## 2018-08-28 ASSESSMENT — ACTIVITIES OF DAILY LIVING (ADL)
GROOMING: INDEPENDENT
ORAL_HYGIENE: INDEPENDENT
DRESS: SCRUBS (BEHAVIORAL HEALTH)
ORAL_HYGIENE: INDEPENDENT
DRESS: INDEPENDENT;SCRUBS (BEHAVIORAL HEALTH)
GROOMING: INDEPENDENT
LAUNDRY: UNABLE TO COMPLETE

## 2018-08-28 ASSESSMENT — PATIENT HEALTH QUESTIONNAIRE - PHQ9: SUM OF ALL RESPONSES TO PHQ QUESTIONS 1-9: 6

## 2018-08-28 NOTE — PLAN OF CARE
Problem: Patient Care Overview  Goal: Individualization & Mutuality  Patient will be compliant with treatment team recommendations.  Patient will report at least 6-8 hours of sleep each night.    8/21/18 Patient may keep wet wipes in her bathroom per patient care order for proper hygiene after chemo treatments.  Patient will attend groups.   Patient is unable to have a second scrub jacket as patient has been using jackets inappropriately.    Outcome: No Change  Patient is alert and up on the unit this afternoon. Appears stressed at times and paces the hallway with peers. Doesn't attend groups but did have some postive affirmations that she wanted taped to her bathroom window. Patient currently states she feels better today than yesterday. Appetite is fine and bowel and bladder are normal for her. Patient does have rambling, pressured speech.     Problem: Thought Process Alteration (Adult)  Goal: Identify Related Risk Factors and Signs and Symptoms  Patient will be compliant with medications and treatment team recommendations while on unit.    Outcome: No Change  Patient has rapid pressured speech today.

## 2018-08-28 NOTE — PROGRESS NOTES
Doing well no fevers, minimal pain     There is still some redness of the right mastectomy flap, no significant fluid re-acumulation, dressing intact.     A/P: Concern for infected seroma, gram stain shows some gram + cocci, if comes back as MRSA will need to switch up antibiotics. No progression of redness noted. Will try to avoid opening mastectomy flap but this may be the only way to clear up infection but will continue to monitor.     Murray Garces

## 2018-08-28 NOTE — PLAN OF CARE
Problem: Patient Care Overview  Goal: Team Discussion  Team Plan:   BEHAVIORAL TEAM DISCUSSION    Participants:Haily Tomlinson NP, Michael Aranda NP, Toya Wolf NP, Renetta Quinteros LSW,  Geno Hargrove LSW,  Edie Eugene RN, Janice Garcia [facility], Vanessa Diez OT, Chuy Quijano RN  Progress: Fair- Anxiety, attending group programming   Continued Stay Criteria/Rationale: Increase Lamictal, Change Risperdal M tabs 1 mg from scheduled to increased prn dose, Next Consta due next week  Medical/Physical: double mastectomy- draining port tomorrow, chemo treatment on Monday  Precautions:   Behavioral Orders   Procedures     Code 1 - Restrict to Unit     Routine Programming     As clinically indicated     Status 15     Every 15 minutes.     Plan: Commitment extended, On list for [facility] looking to discharge placement options   Rationale for change in precutions or plan: None

## 2018-08-28 NOTE — PROGRESS NOTES
"[facility]  Psychiatric Progress Note    Subjective   This is a 28 year old female with schizoaffective disorder, bipolar type and polysubstance abuse.    Patient up in [facility].  She talks to me about how she feels anxious and anger regarding her past issues and dealing with her present situation of having cancer.  Allow for expressing her thoughts and what she feels her needs are going forward.  We both agree therapy at this point would be most beneficial, along with utilizing her learned coping skills - she has been using the alpha stim with good benefit.  Patient reports overall doing \"ok\" and tired.  She denies suicidal thoughts.  We review her medication which she feels she could use an increase in lamictal, she recently had those increases.  Will monitor and explore options.      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          Medications:       amantadine  100 mg Oral BID     celecoxib  200 mg Oral BID     cephalexin  250 mg Oral Q8H MICHAEL     cholecalciferol  2,000 Units Oral Daily     cloNIDine  0.1 mg Oral TID     cyanocobalamin  1,000 mcg Oral Daily     fish oil-omega-3 fatty acids  1 g Oral BID     gabapentin  600 mg Oral 4x Daily     [START ON 9/4/2018] lamoTRIgine  100 mg Oral BID     lamoTRIgine (LaMICtal) tablet 50 mg  50 mg Oral BID     loratadine  10 mg Oral Daily     nicotine  1 patch Transdermal Daily     nicotine   Transdermal Q8H     nicotine   Transdermal Daily     PNV PRENATAL PLUS MULTIVITAMIN  1 tablet " "Oral Daily     pyridOXINE  25 mg Oral Daily     ranitidine  150 mg Oral BID     risperiDONE microspheres  50 mg Intramuscular Q14 Days     saccharomyces boulardii  250 mg Oral BID     acetaminophen, alum & mag hydroxide-simethicone, amantadine, diphenhydrAMINE, gabapentin, hydrOXYzine (VISTARIL) capsule  mg, LORazepam, magnesium hydroxide, mineral oil-hydrophilic petrolatum, nicotine polacrilex, risperiDONE, selenium sulfide          Allergies:     Allergies   Allergen Reactions     Azithromycin Anaphylaxis     Abilify [Aripiprazole] Unknown     Wellbutrin [Bupropion] Other (See Comments)     suicidal     Zithromax [Azithromycin Dihydrate]           Psychiatric Examination:   /92  Pulse 102  Temp 99.2  F (37.3  C) (Tympanic)  Resp 16  Ht 1.651 m (5' 5\")  Wt 100.2 kg (220 lb 14.4 oz)  SpO2 97%  BMI 36.76 kg/m2  Weight is 220 lbs 14.4 oz  Body mass index is 36.76 kg/(m^2).    Appearance:  awake, alert, adequately groomed and dressed in hospital scrubs  Attitude:  cooperative  Eye Contact:  good  Mood:  anxious and depressed  Affect:  mood congruent, intensity is heightened and intensity is dramatic  Speech:  clear, coherent   Psychomotor Behavior:  no evidence of tardive dyskinesia, dystonia, or tics and intact station, gait and muscle tone  Thought Process:  disorganized, tangental and although better  Associations:  loosening of associations present  Thought Content:  no evidence of suicidal ideation or homicidal ideation  Insight:  fair  Judgment:  fair  Oriented to:  time, person, and place  Attention Span and Concentration:  fair  Recent and Remote Memory:  fair  Fund of Knowledge: appropriate  Muscle Strength and Tone: normal  Gait and Station: Normal  Perception: no perceptual disturbance noted         Labs:     No results found for this or any previous visit (from the past 24 hour(s)).      Assessment/ Plan:   Continue Inpatient Hospitalization  Continue to provide a safe environment and " therapeutic milieu.  Change Risperdal M tabs 1 mg from scheduled to increased prn dose as patient reports increased agitation with scheduled doses. Next Consta due next week.  Lamictal to be increased 8/28 and 9/4    Recommend psychotherapy    ELOS:  >5 days due to completion of cancer infusions on 8/30/18  Discharge to safe, supportive environment.   Recommend close to [facility] so can continue Cancer treatments along with support groups for low anxiety.

## 2018-08-28 NOTE — PLAN OF CARE
Problem: Patient Care Overview  Goal: Individualization & Mutuality  Patient will be compliant with treatment team recommendations.  Patient will report at least 6-8 hours of sleep each night.    8/21/18 Patient may keep wet wipes in her bathroom per patient care order for proper hygiene after chemo treatments.  Patient will attend groups.   Patient is unable to have a second scrub jacket as patient has been using jackets inappropriately.    Outcome: Improving  Pt has been up and active today. She has been doing a great deal of journaling and reading of handouts on dealing with grief, resolving toxic shame, and how to stop ruminating. She is asking to have therapy to help her cope with all the life changes that have happened to her over the last year. She was seen by surgery and will need to have the infection around her port drained on Wednesday. She has a great deal of insight into her mental illness and addiction but struggles with applying the skills. She has been much calmer today and apologized for her outbursts yesterday. She did attend some of the groups today though has been keeping to herself.  She is worried about the infection in her port.    Problem: Thought Process Alteration (Adult)  Goal: Improved Thought Process  Patient will hold reality based conversations prior to discharge.    Outcome: Improving  Pt has been able to have reality based conversations all day

## 2018-08-28 NOTE — PLAN OF CARE
Face to face end of shift report received from Chuy MOYA RN. Rounding completed. Patient observed.     Barby Arguelles  8/28/2018  4:09 PM

## 2018-08-28 NOTE — PHARMACY
[facility]    Pharmacy      Antimicrobial Stewardship Note     Current antimicrobial therapy:  Anti-infectives (Future)    Start     Dose/Rate Route Frequency Ordered Stop    08/24/18 1445  cephalexin (KEFLEX) capsule 250 mg      250 mg Oral EVERY 8 HOURS SCHEDULED 08/24/18 1441 08/29/18 1359          Indication: SSTI    Days of Therapy: 5     Pertinent labs:  Creatinine   Creatinine   Date Value Ref Range Status   08/24/2018 0.46 (L) 0.52 - 1.04 mg/dL Final   08/20/2018 0.35 (L) 0.52 - 1.04 mg/dL Final   08/13/2018 0.44 (L) 0.52 - 1.04 mg/dL Final     WBC   WBC   Date Value Ref Range Status   08/27/2018 9.4 4.0 - 11.0 10e9/L Final   08/24/2018 9.5 4.0 - 11.0 10e9/L Final   08/20/2018 8.6 4.0 - 11.0 10e9/L Final     Procalcitonin   Procalcitonin   Date Value Ref Range Status   08/24/2018 <0.05 ng/ml Final     Comment:     <0.05 ng/ml  Normal  Recommendation: Very low risk of bacterial infection.   Discourage antibiotics unless strong clinical suspicion for serious infection.       CRP   CRP Inflammation   Date Value Ref Range Status   01/29/2016 8.2 (H) 0.0 - 8.0 mg/L Final       Culture Results:   7-Day Micro Results      Procedure Component Value Units Date/Time     Wound Culture Aerobic Bacterial      Order Status: No result Lab Status: No result      Specimen: Wound      Gram stain      Order Status: No result Lab Status: No result      Fluid Culture Aerobic Bacterial [[id number]] Collected: 08/27/18 1130     Order Status: Completed Lab Status: In process Updated: 08/27/18 1157     Specimen: Breast aspirate from Breast, Right      Gram stain [[id number]] (Abnormal) Collected: 08/27/18 1130     Order Status: Completed Lab Status: Final result Updated: 08/28/18 0740     Specimen: Breast aspirate       Specimen Description Breast aspirate Right Breast      Gram Stain Many   WBC'S seen          Few   Gram positive cocci    (A)     Blood culture [[id number]] Collected: 08/24/18 1150     Order Status: Completed Lab  Status: Preliminary result Updated: 08/27/18 1258     Specimen: Blood from Portacath       Specimen Description Blood      Culture Micro No growth after 3 days              Recommendations/Interventions:  1. None at this time.    Alma Cannon, [facility]  August 28, 2018

## 2018-08-28 NOTE — PLAN OF CARE
Problem: Patient Care Overview  Goal: Discharge Needs Assessment  Outcome: No Change  Spoke with pt this morning- She states she thinks she is going through the stages of grief after her double mastectomy. Says she feels like she just needs to cry it out and that's okay. Informed pt staff talked with JARAD Deepthi about meeting with her for therapy while on the unit.      Spoke with CPA this afternoon who states pt is on the [facility] list for [place] and [place]- CPA reports they are placing pt's from the end of June, beginning of July, and there are 18 priority pt's at this time.

## 2018-08-28 NOTE — PLAN OF CARE
Face to face end of shift report received from Angie MEDRANO. Rounding completed. Patient observed in dayroom and introduced to nursing for the shift    Chuy Samm  8/28/2018  4215

## 2018-08-28 NOTE — PLAN OF CARE
Problem: Patient Care Overview  Goal: Individualization & Mutuality  Patient will be compliant with treatment team recommendations.  Patient will report at least 6-8 hours of sleep each night.    8/21/18 Patient may keep wet wipes in her bathroom per patient care order for proper hygiene after chemo treatments.  Patient will attend groups.   Patient is unable to have a second scrub jacket as patient has been using jackets inappropriately.    Pt appeared to be sleeping at the beginning of this shift, normal respirations and position changes noted. Pt awake at 0500 and out into lobby and pacing hallways. Pt given Ativan 1 mg and Risperidone 1 mg at 0542 per request for increased anxiety and pacing. Pt clogged her toilet this morning, pt admits to flushing her wipes, pt instructed not to be flushing the wipes she has in her bathroom as they are not the flushing type.

## 2018-08-28 NOTE — PLAN OF CARE
Face to face end of shift report received from Barby MEDRANO. Rounding completed. Patient observed.     Angie Savage  8/27/2018  11:38 PM

## 2018-08-28 NOTE — PROGRESS NOTES
08/28/18 1521   Signing Clinician's Name / Credentials   Signing clinician's name / credentials Vanessa Diez OTR/L   Quick Adds   Rehab Discipline OT   Therapeutic Activity   Minutes of Treatment 40 minutes   Symptoms Noted During/After Treatment None   Treatment Detail Education provided on stages of grief.  Reviewed the 5 stages of grief and healthy coping.  Pt denies pain today, admits to racing thoughts and excesive worry.  Education provided (demo and handout) on healing touch modality - chakra connection.  Chakra connection is a full body technique that establishes an interconnection of the chakras, opening a free and unencumbered balanced flow.  It promotes relaxation and well being and establishes flow post - trauma (such as chemotherapy.)  Pt still reporting worry; completed No[facility] Mind Clear HT modality with noted decrease in racing thoughts per report.  Pt challeneged on negative thinking statements - and was able to reframe to positive thinking statements with cues.   Additional Documentation   Rehab Comments Pt reports feelings of grief related to Bilateral mastectomy.  Pt states she has feelings of hopelessness at times, and can get into spiral of negative thinking.  Is able to reframe thoughts after verbal cues to do so.   OT Plan cont 3x/week for healing touch modalities and healthy coping skills.   Total Session Time   Total Session Time (minutes) 40 minutes

## 2018-08-29 ENCOUNTER — OFFICE VISIT (OUTPATIENT)
Dept: SURGERY | Facility: OTHER | Age: 28
DRG: 885 | End: 2018-08-29
Attending: SURGERY
Payer: MEDICARE

## 2018-08-29 VITALS
WEIGHT: 220 LBS | BODY MASS INDEX: 36.65 KG/M2 | TEMPERATURE: 97.8 F | DIASTOLIC BLOOD PRESSURE: 68 MMHG | HEART RATE: 97 BPM | SYSTOLIC BLOOD PRESSURE: 118 MMHG | HEIGHT: 65 IN

## 2018-08-29 DIAGNOSIS — N64.89 SEROMA OF BREAST: Primary | ICD-10-CM

## 2018-08-29 LAB
BACTERIA SPEC CULT: ABNORMAL
SPECIMEN SOURCE: ABNORMAL

## 2018-08-29 PROCEDURE — 25000132 ZZH RX MED GY IP 250 OP 250 PS 637: Mod: GY | Performed by: NURSE PRACTITIONER

## 2018-08-29 PROCEDURE — A9270 NON-COVERED ITEM OR SERVICE: HCPCS | Mod: GY | Performed by: NURSE PRACTITIONER

## 2018-08-29 PROCEDURE — 10140 I&D HMTMA SEROMA/FLUID COLLJ: CPT | Mod: 58 | Performed by: SURGERY

## 2018-08-29 PROCEDURE — A9270 NON-COVERED ITEM OR SERVICE: HCPCS | Mod: GY | Performed by: SURGERY

## 2018-08-29 PROCEDURE — 40000133 ZZH STATISTIC OT WARD VISIT

## 2018-08-29 PROCEDURE — 99024 POSTOP FOLLOW-UP VISIT: CPT | Mod: 25 | Performed by: SURGERY

## 2018-08-29 PROCEDURE — 25000132 ZZH RX MED GY IP 250 OP 250 PS 637: Mod: GY | Performed by: SURGERY

## 2018-08-29 PROCEDURE — 12400011

## 2018-08-29 PROCEDURE — G0463 HOSPITAL OUTPT CLINIC VISIT: HCPCS | Mod: 25

## 2018-08-29 PROCEDURE — 25000132 ZZH RX MED GY IP 250 OP 250 PS 637: Mod: GY | Performed by: INTERNAL MEDICINE

## 2018-08-29 PROCEDURE — 10140 I&D HMTMA SEROMA/FLUID COLLJ: CPT

## 2018-08-29 PROCEDURE — A9270 NON-COVERED ITEM OR SERVICE: HCPCS | Mod: GY | Performed by: INTERNAL MEDICINE

## 2018-08-29 PROCEDURE — 97530 THERAPEUTIC ACTIVITIES: CPT | Mod: GO

## 2018-08-29 RX ADMIN — NICOTINE POLACRILEX 4 MG: 2 GUM, CHEWING ORAL at 15:29

## 2018-08-29 RX ADMIN — Medication 25 MG: at 08:08

## 2018-08-29 RX ADMIN — RISPERIDONE 1 MG: 1 TABLET, ORALLY DISINTEGRATING ORAL at 08:43

## 2018-08-29 RX ADMIN — GABAPENTIN 300 MG: 300 CAPSULE ORAL at 11:00

## 2018-08-29 RX ADMIN — CEPHALEXIN 250 MG: 250 CAPSULE ORAL at 21:49

## 2018-08-29 RX ADMIN — LORATADINE 10 MG: 10 TABLET ORAL at 08:10

## 2018-08-29 RX ADMIN — CEPHALEXIN 250 MG: 250 CAPSULE ORAL at 05:19

## 2018-08-29 RX ADMIN — ACETAMINOPHEN 650 MG: 325 TABLET, FILM COATED ORAL at 11:01

## 2018-08-29 RX ADMIN — LORAZEPAM 1 MG: 1 TABLET ORAL at 17:35

## 2018-08-29 RX ADMIN — CLONIDINE HYDROCHLORIDE 0.1 MG: 0.1 TABLET ORAL at 13:12

## 2018-08-29 RX ADMIN — NICOTINE POLACRILEX 4 MG: 2 GUM, CHEWING ORAL at 11:00

## 2018-08-29 RX ADMIN — CELECOXIB 200 MG: 100 CAPSULE ORAL at 20:28

## 2018-08-29 RX ADMIN — NICOTINE 1 PATCH: 21 PATCH, EXTENDED RELEASE TRANSDERMAL at 08:07

## 2018-08-29 RX ADMIN — Medication 1 G: at 20:28

## 2018-08-29 RX ADMIN — CLONIDINE HYDROCHLORIDE 0.1 MG: 0.1 TABLET ORAL at 20:28

## 2018-08-29 RX ADMIN — NICOTINE POLACRILEX 4 MG: 2 GUM, CHEWING ORAL at 20:42

## 2018-08-29 RX ADMIN — AMANTADINE 100 MG: 100 CAPSULE ORAL at 08:47

## 2018-08-29 RX ADMIN — RANITIDINE 150 MG: 150 TABLET ORAL at 08:10

## 2018-08-29 RX ADMIN — NICOTINE POLACRILEX 4 MG: 2 GUM, CHEWING ORAL at 13:12

## 2018-08-29 RX ADMIN — CYANOCOBALAMIN TAB 1000 MCG 1000 MCG: 1000 TAB at 08:10

## 2018-08-29 RX ADMIN — GABAPENTIN 600 MG: 300 CAPSULE ORAL at 20:28

## 2018-08-29 RX ADMIN — AMANTADINE 100 MG: 100 CAPSULE ORAL at 20:28

## 2018-08-29 RX ADMIN — HYDROXYZINE PAMOATE 100 MG: 50 CAPSULE ORAL at 08:43

## 2018-08-29 RX ADMIN — NICOTINE POLACRILEX 4 MG: 2 GUM, CHEWING ORAL at 06:03

## 2018-08-29 RX ADMIN — NICOTINE POLACRILEX 4 MG: 2 GUM, CHEWING ORAL at 17:35

## 2018-08-29 RX ADMIN — CLONIDINE HYDROCHLORIDE 0.1 MG: 0.1 TABLET ORAL at 08:09

## 2018-08-29 RX ADMIN — Medication 250 MG: at 20:28

## 2018-08-29 RX ADMIN — Medication 1 G: at 08:09

## 2018-08-29 RX ADMIN — NICOTINE POLACRILEX 4 MG: 2 GUM, CHEWING ORAL at 08:07

## 2018-08-29 RX ADMIN — GABAPENTIN 600 MG: 300 CAPSULE ORAL at 08:08

## 2018-08-29 RX ADMIN — RANITIDINE 150 MG: 150 TABLET ORAL at 20:28

## 2018-08-29 RX ADMIN — GABAPENTIN 600 MG: 300 CAPSULE ORAL at 13:12

## 2018-08-29 RX ADMIN — GABAPENTIN 600 MG: 300 CAPSULE ORAL at 17:32

## 2018-08-29 RX ADMIN — Medication 250 MG: at 08:09

## 2018-08-29 RX ADMIN — CEPHALEXIN 250 MG: 250 CAPSULE ORAL at 13:12

## 2018-08-29 RX ADMIN — VITAMIN A, VITAMIN C, VITAMIN D-3, VITAMIN E, VITAMIN B-1, VITAMIN B-2, NIACIN, VITAMIN B-6, CALCIUM, IRON, ZINC, COPPER 1 TABLET: 4000; 120; 400; 22; 1.84; 3; 20; 10; 1; 12; 200; 27; 25; 2 TABLET ORAL at 08:09

## 2018-08-29 RX ADMIN — LAMOTRIGINE 50 MG: 25 TABLET ORAL at 08:09

## 2018-08-29 RX ADMIN — NICOTINE POLACRILEX 4 MG: 2 GUM, CHEWING ORAL at 04:56

## 2018-08-29 RX ADMIN — VITAMIN D, TAB 1000IU (100/BT) 2000 UNITS: 25 TAB at 08:09

## 2018-08-29 RX ADMIN — LORAZEPAM 1 MG: 1 TABLET ORAL at 08:43

## 2018-08-29 RX ADMIN — CELECOXIB 200 MG: 100 CAPSULE ORAL at 08:48

## 2018-08-29 RX ADMIN — LAMOTRIGINE 50 MG: 25 TABLET ORAL at 20:28

## 2018-08-29 ASSESSMENT — ACTIVITIES OF DAILY LIVING (ADL)
LAUNDRY: UNABLE TO COMPLETE
GROOMING: INDEPENDENT
LAUNDRY: UNABLE TO COMPLETE
ORAL_HYGIENE: INDEPENDENT
DRESS: INDEPENDENT
ORAL_HYGIENE: INDEPENDENT
GROOMING: INDEPENDENT
DRESS: INDEPENDENT;SCRUBS (BEHAVIORAL HEALTH)

## 2018-08-29 ASSESSMENT — PAIN SCALES - GENERAL: PAINLEVEL: NO PAIN (0)

## 2018-08-29 NOTE — NURSING NOTE
"Chief Complaint   Patient presents with     RECHECK     Right mastectomy site swelling.         Initial /68 (BP Location: Right arm, Patient Position: Chair, Cuff Size: Adult Regular)  Pulse 97  Temp 97.8  F (36.6  C) (Tympanic)  Ht 5' 5\" (1.651 m)  Wt 220 lb (99.8 kg)  BMI 36.61 kg/m2 Estimated body mass index is 36.61 kg/(m^2) as calculated from the following:    Height as of this encounter: 5' 5\" (1.651 m).    Weight as of this encounter: 220 lb (99.8 kg).  Medication Reconciliation: complete    ERIKA IVAN LPN    "

## 2018-08-29 NOTE — PLAN OF CARE
Face to face end of shift report received from Chuy MOYA RN. Rounding completed. Patient observed in [facility].     Mary Velasquez  8/29/2018  3:48 PM

## 2018-08-29 NOTE — PHARMACY
[facility]    Pharmacy      Antimicrobial Stewardship Note     Current antimicrobial therapy:  Anti-infectives (Future)    Start     Dose/Rate Route Frequency Ordered Stop    08/29/18 1400  cephalexin (KEFLEX) capsule 250 mg      250 mg Oral EVERY 8 HOURS SCHEDULED 08/29/18 1006 09/03/18 1359          Indication: SSTI    Days of Therapy: 6     Pertinent labs:  Creatinine   Creatinine   Date Value Ref Range Status   08/24/2018 0.46 (L) 0.52 - 1.04 mg/dL Final   08/20/2018 0.35 (L) 0.52 - 1.04 mg/dL Final   08/13/2018 0.44 (L) 0.52 - 1.04 mg/dL Final     WBC   WBC   Date Value Ref Range Status   08/27/2018 9.4 4.0 - 11.0 10e9/L Final   08/24/2018 9.5 4.0 - 11.0 10e9/L Final   08/20/2018 8.6 4.0 - 11.0 10e9/L Final     Procalcitonin   Procalcitonin   Date Value Ref Range Status   08/24/2018 <0.05 ng/ml Final     Comment:     <0.05 ng/ml  Normal  Recommendation: Very low risk of bacterial infection.   Discourage antibiotics unless strong clinical suspicion for serious infection.       CRP   CRP Inflammation   Date Value Ref Range Status   01/29/2016 8.2 (H) 0.0 - 8.0 mg/L Final       Culture Results:   7-Day Micro Results      Procedure Component Value Units Date/Time     Wound Culture Aerobic Bacterial      Order Status: No result Lab Status: No result      Specimen: Wound      Gram stain      Order Status: No result Lab Status: No result      Fluid Culture Aerobic Bacterial [[id number]] (Abnormal)  Collected: 08/27/18 1130     Order Status: Completed Lab Status: Final result Updated: 08/29/18 1258     Specimen: Breast aspirate from Breast, Right       Specimen Description Breast aspirate Right Breast      Culture Micro Heavy growth   Streptococcus agalactiae sero group B    (A)     Culture & Susceptibility      STREPTOCOCCUS AGALACTIAE SERO GROUP B      Antibiotic Interpretation Sensitivity Unit Method Status     AMPICILLIN Sensitive <=0.25 ug/mL BERNICE Final     CLINDAMYCIN Resistant 4 ug/mL BERNICE Final      LEVOFLOXACIN Sensitive 1 ug/mL BERNICE Final     PENICILLIN Sensitive <=0.12 ug/mL BERNICE Final     VANCOMYCIN Sensitive <=0.5 ug/mL BERNICE Final                           Gram stain [[id number]] (Abnormal) Collected: 08/27/18 1130     Order Status: Completed Lab Status: Final result Updated: 08/28/18 0740     Specimen: Breast aspirate       Specimen Description Breast aspirate Right Breast      Gram Stain Many   WBC'S seen          Few   Gram positive cocci    (A)     Blood culture [[id number]] Collected: 08/24/18 1150     Order Status: Completed Lab Status: Preliminary result Updated: 08/29/18 0643     [facility]       Specimen Description Blood      Culture Micro No growth after 5 days              Recommendations/Interventions:  1. None at this time.    Alma Cannon, [facility]  August 29, 2018

## 2018-08-29 NOTE — PLAN OF CARE
Problem: Patient Care Overview  Goal: Individualization & Mutuality  Patient will be compliant with treatment team recommendations.  Patient will report at least 6-8 hours of sleep each night.    8/21/18 Patient may keep wet wipes in her bathroom per patient care order for proper hygiene after chemo treatments.  Patient will attend groups.   Patient is unable to have a second scrub jacket as patient has been using jackets inappropriately.    2347 in bed with easy respirations, presenting sleeping. 0504 slept until now. Up walking In monsalve, only asking for george gum.

## 2018-08-29 NOTE — PROGRESS NOTES
Face to face end of shift report received from vicenta streeter at 2300. Rounding completed. Patient observed.     Rolanda Keane  8/29/2018  12:16 AM

## 2018-08-29 NOTE — MR AVS SNAPSHOT
"              After Visit Summary   8/29/2018    Marti Javier    MRN: [id number]           Patient Information     Date Of Birth          1990        Visit Information        Provider Department      8/29/2018 10:00 AM Murray Garces MD [facility]        Today's Diagnoses     Seroma of breast    -  1       Follow-ups after your visit        Your next 10 appointments already scheduled     Sep 04, 2018 11:00 AM CDT   (Arrive by 10:45 AM)   [facility] with [facility]ces [facility]   [facility] [place] [facility]    [address]  [place] [address]   [phone]            Sep 04, 2018 12:30 PM CDT   Level 4 with HC INF RM 3302   [facility]    [address]  [place] [address]   [phone]              Who to contact     If you have questions or need follow up information about today's clinic visit or your schedule please contact [facility] directly at [phone].  Normal or non-critical lab and imaging results will be communicated to you by Yappnhart, letter or phone within 4 business days after the clinic has received the results. If you do not hear from us within 7 days, please contact the clinic through Yappnhart or phone. If you have a critical or abnormal lab result, we will notify you by phone as soon as possible.  Submit refill requests through eblizz or call your pharmacy and they will forward the refill request to us. Please allow 3 business days for your refill to be completed.          Additional Information About Your Visit        MyChart Information     eblizz lets you send messages to your doctor, view your test results, renew your prescriptions, schedule appointments and more. To sign up, go to [email or web address] . Click on \"Log in\" on the left side of the screen, which will take you to the Welcome page. Then click on \"Sign up Now\" on the right side of the page.     You will " "be asked to enter the access code listed below, as well as some personal information. Please follow the directions to create your username and password.     Your access code is: [email or web address]  Expires: 2018 10:44 AM     Your access code will  in 90 days. If you need help or a new code, please call your [place] clinic or [phone].        Care EveryWhere ID     This is your Care EveryWhere ID. This could be used by other organizations to access your [place] medical records  [id number]        Your Vitals Were     Pulse Temperature Height BMI (Body Mass Index)          97 97.8  F (36.6  C) (Tympanic) 5' 5\" (1.651 m) 36.61 kg/m2         Blood Pressure from Last 3 Encounters:   18 126/65   18 118/68   18 116/70    Weight from Last 3 Encounters:   18 220 lb 14.4 oz (100.2 kg)   18 220 lb (99.8 kg)   18 219 lb (99.3 kg)              Today, you had the following     No orders found for display         Today's Medication Changes      Notice     This visit is during an admission. Changes to the med list made in this visit will be reflected in the After Visit Summary of the admission.             Primary Care Provider Fax #    Physician No Ref-Primary [phone]       No address on file        Equal Access to Services     SHARI JOHNSON : Hadii marilyn Rodríguez, jah kay, qaphill boyle . So [facility] [phone].    ATENCIÓN: Si habla español, tiene a downey disposición servicios gratuitos de asistencia lingüística. Llame al [phone].    We comply with applicable federal civil rights laws and [place] laws. We do not discriminate on the basis of race, color, national origin, age, disability, sex, sexual orientation, or gender identity.            Thank you!     Thank you for choosing [facility]  for your care. Our goal is always to provide you with excellent care. Hearing back from our patients is " one way we can continue to improve our services. Please take a few minutes to complete the written survey that you may receive in the mail after your visit with us. Thank you!             Your Updated Medication List - Protect others around you: Learn how to safely use, store and throw away your medicines at www.disposemymeds.org.      Notice     This visit is during an admission. Changes to the med list made in this visit will be reflected in the After Visit Summary of the admission.

## 2018-08-29 NOTE — PLAN OF CARE
Face to face end of shift report received from Frida MEDRANO. Rounding completed. Patient observed in the dayroom and introduced to nursing for the shift.    Chuy Quijano  8/29/2018  [id number]

## 2018-08-29 NOTE — PLAN OF CARE
Problem: Patient Care Overview  Goal: Team Discussion  Team Plan:   BEHAVIORAL TEAM DISCUSSION    Participants:  Haily Tomlinson NP, Amberly Sommers NP, Renetta Quinteros LSW, Geno Hargrove LSW, Raheel Wallace RN, Marilyn Irvin OT, Haily Fuchs OT  Progress: fair, utilizing healthy coping skills  Continued Stay Criteria/Rationale: continue to monitor and stabilize  Medical/Physical: infection around port, active breast cancer  Precautions:   Behavioral Orders   Procedures     Code 1 - Restrict to Unit     Routine Programming     As clinically indicated     Status 15     Every 15 minutes.     Plan: looking into apartments   Rationale for change in precautions or plan: None

## 2018-08-29 NOTE — PLAN OF CARE
Problem: Patient Care Overview  Goal: Individualization & Mutuality  Patient will be compliant with treatment team recommendations.  Patient will report at least 6-8 hours of sleep each night.    8/21/18 Patient may keep wet wipes in her bathroom per patient care order for proper hygiene after chemo treatments.  Patient will attend groups.   Patient is unable to have a second scrub jacket as patient has been using jackets inappropriately.    Outcome: Improving  Pt struggled a great deal this morning with anxiety about having her port looked at by the surgeon this morning. She told me that she just wants the port pulled and to refuse her last chemo treatment.  She told me that her port site is not red anymore and has no swelling, so she does not even need to go to the apointment this morning.  I quietly and simply explained to the risks involved with a central line infection.  Pt said that she did not want to hear about it.  She asked for PRN medications and was given Atarax 100, Ativan 1 mg, and Risperdal 1 mg at 0845.  Pt returned from her appointment saying that it went well and asked for another PRN. She was given 300 of Gabapentin at 11:00.  Pt wrote in her journal some and took a nap after lunch. She woke for her 1330 medications and thanked me for caring and giving her good informations.    Problem: Coping, Compromised Individual (Adult,Obstetrics,Pediatric)  Goal: Effective Coping  Patient will apply her coping skills, actively deal with grief issues, and talk with nursing staff about her issues in an appropriate manner.  Outcome: Improving  Pt was very anxious and afraid before her surgery appointment this morning.

## 2018-08-29 NOTE — Clinical Note
Infected seroma of right breast with strep, clinically improving will see back Tuesday may be able to have her last chemo shortly after that. Will keep you updated.

## 2018-08-29 NOTE — PLAN OF CARE
Problem: Patient Care Overview  Goal: Discharge Needs Assessment  Outcome: Improving  Spoke with pt's CM Angie this morning- Angie states her and the pt have come to an agreement. Pt has agreed to let Angie send a referral to the [facility] in [place] instead of going the group home route. Angie states she is okay with this plan because these are supervised apartments that pt would live in for 9-12 months, they have nurses on sight that would give pt her medications and 2 daily groups. Angie states they currently have openings, she sent the referral over, and has a call into [employer] at [place]. Angie states she will update staff when she hears back on the referral.     Spoke with pt this afternoon- Informed pt staff spoke with CM regarding the [facility] plan. Pt asks staff to contact her CM again and ask if she can move to [place], or possibly stay at [facility] until her chemo treatment. Informed pt that plan was denied in the past and likely will be again but staff did email pt's CM with request.

## 2018-08-29 NOTE — PROGRESS NOTES
"[facility] Progress Note    HPI: 28 y.o. Female with history of stage II left breast cancer undergoing adjuvant chemotherapy. Found to have painful swelling and redness under right mastectomy scar. Aspirated in clinic on 8/27 and found to be consistent with infected, strep, seroma. Unclear if this pocket communicates with port.       S: She is feeling better with, pain has improved, energy is better, no fevers.     O:   Vitals:  /68 (BP Location: Right arm, Patient Position: Chair, Cuff Size: Adult Regular)  Pulse 97  Temp 97.8  F (36.6  C) (Tympanic)  Ht 5' 5\" (1.651 m)  Wt 220 lb (99.8 kg)  BMI 36.61 kg/m2      Physical Exam:  Right breast: minimal erythema, small fluid collection palpable near the port.      Assessment/Plan:  Significant improvement in erythema and clinical symptoms, will plan to aspirate small area near port today. Will plan to continue oral antibiotics for 5 more days. No significant  Re-accumulation of fluid from aspiration Monday. Strep sensitive to PCN. Will see back on Tuesday to see if things are maintaining.     Murray Garces         [facility]  Minor Procedure Note     Preoperative diagnosis:  Seroma      Postoperative diagnosis: Residual seroma      Procedure:  Aspiration of right breast seroma      Anesthesia:  Local     History: see above      Findings:  50 ml of serous fluid aspirated.      Tissue to pathology:    none      Details:   The requisite time out pause observed during which the patient confirmed their identity, date of birth, side and site of the requested excision.  The region was prepped and draped sterilely; local anesthesia was obtained with infiltration of 3 cc of 1% lidocaine. Then taking an 18 gauge needle the fluctuant pocket was entered and aprox 50cc. A sterile dressing was placed over the needle hole. The procedure was well tolerated and the patient was discharged back to psych floor. Will await culture results and " follow clinically.      Murray Garces

## 2018-08-30 LAB
BACTERIA SPEC CULT: NORMAL
BASOPHILS # BLD AUTO: 0 10E9/L (ref 0–0.2)
BASOPHILS NFR BLD AUTO: 0.4 %
DIFFERENTIAL METHOD BLD: ABNORMAL
EOSINOPHIL # BLD AUTO: 0 10E9/L (ref 0–0.7)
EOSINOPHIL NFR BLD AUTO: 0 %
ERYTHROCYTE [DISTWIDTH] IN BLOOD BY AUTOMATED COUNT: 17.6 % (ref 10–15)
HCT VFR BLD AUTO: 34.6 % (ref 35–47)
HGB BLD-MCNC: 11.5 G/DL (ref 11.7–15.7)
IMM GRANULOCYTES # BLD: 0.1 10E9/L (ref 0–0.4)
IMM GRANULOCYTES NFR BLD: 1.1 %
LYMPHOCYTES # BLD AUTO: 3.1 10E9/L (ref 0.8–5.3)
LYMPHOCYTES NFR BLD AUTO: 38.7 %
MCH RBC QN AUTO: 27.6 PG (ref 26.5–33)
MCHC RBC AUTO-ENTMCNC: 33.2 G/DL (ref 31.5–36.5)
MCV RBC AUTO: 83 FL (ref 78–100)
MONOCYTES # BLD AUTO: 0.7 10E9/L (ref 0–1.3)
MONOCYTES NFR BLD AUTO: 8.8 %
NEUTROPHILS # BLD AUTO: 4 10E9/L (ref 1.6–8.3)
NEUTROPHILS NFR BLD AUTO: 51 %
NRBC # BLD AUTO: 0 10*3/UL
NRBC BLD AUTO-RTO: 0 /100
PLATELET # BLD AUTO: 381 10E9/L (ref 150–450)
RBC # BLD AUTO: 4.16 10E12/L (ref 3.8–5.2)
SPECIMEN SOURCE: NORMAL
WBC # BLD AUTO: 7.9 10E9/L (ref 4–11)

## 2018-08-30 PROCEDURE — 25000132 ZZH RX MED GY IP 250 OP 250 PS 637: Mod: GY | Performed by: NURSE PRACTITIONER

## 2018-08-30 PROCEDURE — 36415 COLL VENOUS BLD VENIPUNCTURE: CPT | Performed by: NURSE PRACTITIONER

## 2018-08-30 PROCEDURE — A9270 NON-COVERED ITEM OR SERVICE: HCPCS | Mod: GY | Performed by: NURSE PRACTITIONER

## 2018-08-30 PROCEDURE — 85025 COMPLETE CBC W/AUTO DIFF WBC: CPT | Performed by: NURSE PRACTITIONER

## 2018-08-30 PROCEDURE — 25000132 ZZH RX MED GY IP 250 OP 250 PS 637: Mod: GY | Performed by: SURGERY

## 2018-08-30 PROCEDURE — 99232 SBSQ HOSP IP/OBS MODERATE 35: CPT | Performed by: NURSE PRACTITIONER

## 2018-08-30 PROCEDURE — A9270 NON-COVERED ITEM OR SERVICE: HCPCS | Mod: GY | Performed by: SURGERY

## 2018-08-30 PROCEDURE — 97530 THERAPEUTIC ACTIVITIES: CPT | Mod: GO

## 2018-08-30 PROCEDURE — 40000133 ZZH STATISTIC OT WARD VISIT

## 2018-08-30 PROCEDURE — 12400011

## 2018-08-30 RX ADMIN — LAMOTRIGINE 50 MG: 25 TABLET ORAL at 08:30

## 2018-08-30 RX ADMIN — WHITE PETROLATUM: 1.75 OINTMENT TOPICAL at 08:29

## 2018-08-30 RX ADMIN — LORAZEPAM 1 MG: 1 TABLET ORAL at 01:25

## 2018-08-30 RX ADMIN — NICOTINE POLACRILEX 4 MG: 2 GUM, CHEWING ORAL at 20:05

## 2018-08-30 RX ADMIN — CELECOXIB 200 MG: 100 CAPSULE ORAL at 08:30

## 2018-08-30 RX ADMIN — ACETAMINOPHEN 650 MG: 325 TABLET, FILM COATED ORAL at 01:22

## 2018-08-30 RX ADMIN — CEPHALEXIN 250 MG: 250 CAPSULE ORAL at 13:19

## 2018-08-30 RX ADMIN — NICOTINE 1 PATCH: 21 PATCH, EXTENDED RELEASE TRANSDERMAL at 08:29

## 2018-08-30 RX ADMIN — NICOTINE POLACRILEX 4 MG: 2 GUM, CHEWING ORAL at 18:34

## 2018-08-30 RX ADMIN — LORATADINE 10 MG: 10 TABLET ORAL at 08:31

## 2018-08-30 RX ADMIN — CLONIDINE HYDROCHLORIDE 0.1 MG: 0.1 TABLET ORAL at 13:28

## 2018-08-30 RX ADMIN — NICOTINE POLACRILEX 4 MG: 2 GUM, CHEWING ORAL at 11:07

## 2018-08-30 RX ADMIN — CLONIDINE HYDROCHLORIDE 0.1 MG: 0.1 TABLET ORAL at 20:05

## 2018-08-30 RX ADMIN — NICOTINE POLACRILEX 4 MG: 2 GUM, CHEWING ORAL at 08:30

## 2018-08-30 RX ADMIN — GABAPENTIN 600 MG: 300 CAPSULE ORAL at 13:19

## 2018-08-30 RX ADMIN — GABAPENTIN 600 MG: 300 CAPSULE ORAL at 16:49

## 2018-08-30 RX ADMIN — RANITIDINE 150 MG: 150 TABLET ORAL at 20:05

## 2018-08-30 RX ADMIN — VITAMIN A, VITAMIN C, VITAMIN D-3, VITAMIN E, VITAMIN B-1, VITAMIN B-2, NIACIN, VITAMIN B-6, CALCIUM, IRON, ZINC, COPPER 1 TABLET: 4000; 120; 400; 22; 1.84; 3; 20; 10; 1; 12; 200; 27; 25; 2 TABLET ORAL at 08:31

## 2018-08-30 RX ADMIN — NICOTINE POLACRILEX 4 MG: 2 GUM, CHEWING ORAL at 13:19

## 2018-08-30 RX ADMIN — RANITIDINE 150 MG: 150 TABLET ORAL at 08:30

## 2018-08-30 RX ADMIN — CEPHALEXIN 250 MG: 250 CAPSULE ORAL at 21:00

## 2018-08-30 RX ADMIN — AMANTADINE 100 MG: 100 CAPSULE ORAL at 08:30

## 2018-08-30 RX ADMIN — CLONIDINE HYDROCHLORIDE 0.1 MG: 0.1 TABLET ORAL at 08:31

## 2018-08-30 RX ADMIN — GABAPENTIN 600 MG: 300 CAPSULE ORAL at 08:30

## 2018-08-30 RX ADMIN — GABAPENTIN 600 MG: 300 CAPSULE ORAL at 20:05

## 2018-08-30 RX ADMIN — Medication 250 MG: at 20:05

## 2018-08-30 RX ADMIN — HYDROXYZINE PAMOATE 100 MG: 50 CAPSULE ORAL at 16:49

## 2018-08-30 RX ADMIN — NICOTINE POLACRILEX 4 MG: 2 GUM, CHEWING ORAL at 16:50

## 2018-08-30 RX ADMIN — NICOTINE POLACRILEX 4 MG: 2 GUM, CHEWING ORAL at 01:22

## 2018-08-30 RX ADMIN — CELECOXIB 200 MG: 100 CAPSULE ORAL at 20:05

## 2018-08-30 RX ADMIN — Medication 1 G: at 08:30

## 2018-08-30 RX ADMIN — VITAMIN D, TAB 1000IU (100/BT) 2000 UNITS: 25 TAB at 08:31

## 2018-08-30 RX ADMIN — CEPHALEXIN 250 MG: 250 CAPSULE ORAL at 05:54

## 2018-08-30 RX ADMIN — LORAZEPAM 1 MG: 1 TABLET ORAL at 13:26

## 2018-08-30 RX ADMIN — AMANTADINE 100 MG: 100 CAPSULE ORAL at 20:05

## 2018-08-30 RX ADMIN — CYANOCOBALAMIN TAB 1000 MCG 1000 MCG: 1000 TAB at 08:30

## 2018-08-30 RX ADMIN — LAMOTRIGINE 50 MG: 25 TABLET ORAL at 20:05

## 2018-08-30 RX ADMIN — DIPHENHYDRAMINE HYDROCHLORIDE 50 MG: 50 CAPSULE ORAL at 01:25

## 2018-08-30 RX ADMIN — Medication 25 MG: at 08:31

## 2018-08-30 RX ADMIN — Medication 1 G: at 20:05

## 2018-08-30 RX ADMIN — NICOTINE POLACRILEX 4 MG: 2 GUM, CHEWING ORAL at 03:33

## 2018-08-30 RX ADMIN — Medication 250 MG: at 08:30

## 2018-08-30 ASSESSMENT — ACTIVITIES OF DAILY LIVING (ADL)
DRESS: INDEPENDENT;SCRUBS (BEHAVIORAL HEALTH)
ORAL_HYGIENE: INDEPENDENT
GROOMING: INDEPENDENT
ORAL_HYGIENE: INDEPENDENT
LAUNDRY: UNABLE TO COMPLETE
LAUNDRY: UNABLE TO COMPLETE
DRESS: SCRUBS (BEHAVIORAL HEALTH)
GROOMING: INDEPENDENT

## 2018-08-30 NOTE — PROGRESS NOTES
08/30/18 0927   Signing Clinician's Name / Credentials   Signing clinician's name / credentials Vanessa Diez OTR/L   Quick Adds   Rehab Discipline OT   Therapeutic Activity   Minutes of Treatment 25 minutes   Symptoms Noted During/After Treatment None   Treatment Detail Pt requested further information on energy centers (chakras) and affirmations relalated to each.  Handout provided and education given.  Reviewed journaling task pt comleted related to gratitude and giving thanks.  Healing touch provided (noels mind clear) for calming, with pt reporting decrease in racing thoughts.    Additional Documentation   Rehab Comments Pt feeling better today; states her appts yesterday went well.  Pt feels better acceptance today and relates it to medication adjustements and healthy coping.  Pt feeling like she is at baseline.     OT Plan Will continue to see one more week; as pt has been cleared for mastectomy lymph exercises - will start next week when antibiotic tx done.   Total Session Time   Total Session Time (minutes) 25 minutes

## 2018-08-30 NOTE — PLAN OF CARE
Face to face end of shift report received from Angie MEDRANO. Rounding completed. Patient observed in dayroom and introduced to nursing for the shift    Chuy Quijano  8/30/2018  7:58 AM

## 2018-08-30 NOTE — PLAN OF CARE
Problem: Patient Care Overview  Goal: Team Discussion  Team Plan:   BEHAVIORAL TEAM DISCUSSION    Participants: Jenni Varma NP, Haily Tomlinson NP,  Amberly Sommers NP, Renetta Quinteros LSW, Geno Hargrove LSW, Edie Eugene RN, Venus Lyons RN, Adam Garg RN, Marilyn Irvin OT, Haily Fuchs OT, Vanessa Diez OT  Progress: fair  Continued Stay Criteria/Rationale: continue to stabilize and finish chemo  Medical/Physical: active breast cancer - chemo, infection by port  Precautions:   Behavioral Orders   Procedures     Code 1 - Restrict to Unit     Routine Programming     As clinically indicated     Status 15     Every 15 minutes.     Plan: looking into DC to [facility] in [place]   Rationale for change in precautions or plan: None

## 2018-08-30 NOTE — PLAN OF CARE
Problem: Patient Care Overview  Goal: Individualization & Mutuality  Patient will be compliant with treatment team recommendations.  Patient will report at least 6-8 hours of sleep each night.    8/21/18 Patient may keep wet wipes in her bathroom per patient care order for proper hygiene after chemo treatments.  Patient will attend groups.   Patient is unable to have a second scrub jacket as patient has been using jackets inappropriately.    Outcome: Improving  Pt has been in her room most of the day journaling, reading the bible, and meditating. She told me that she is trying to accept her situation and find her inner peace. She told me that she is focusing on what she is grateful for and let go of things she cannot control. She apologized to a few staff that she has been rude to over the last few days. She smiled often, was polite, and patient with care and her requests. She did receive 1 mg of Ativan PRN at about 1400 for complaints of some anxiety. She told me that her coping skills are working good today.    Problem: Coping, Compromised Individual (Adult,Obstetrics,Pediatric)  Goal: Effective Coping  Patient will apply her coping skills, actively deal with grief issues, and talk with nursing staff about her issues in an appropriate manner.   Outcome: Improving  Pt has been in her room meditating and writing in her journal

## 2018-08-30 NOTE — PROGRESS NOTES
"[facility]  Psychiatric Progress Note    Subjective   This is a 28 year old female with schizoaffective disorder, bipolar type and polysubstancShe initially was seen to be pretty organized in his speech and thought when started talking about the longer we talked the more she started talking about not needing to take \"my long-acting injection\" she tells me that the Lamictal is going to be enough for her and that she  \"really just has neurological and psychiatric symptoms from a tick bite when she was 8 years old.  We did discuss how the current medications have been very helpful with limited side effects in the past when she has been off of his medications she has returned to psychosis.  We discussed the importance of the medications though she is questioning whether or still.  She was able to tell me that \"sometimes I still think that people are out to get me but I know they are really not and I can bring herself back to reality much quicker and i could \".      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          Medications:       amantadine  100 mg Oral BID     celecoxib  200 mg Oral BID     cephalexin  250 mg Oral Q8H MICHAEL     cholecalciferol  2,000 Units Oral Daily     cloNIDine  0.1 mg Oral TID     cyanocobalamin  1,000 mcg Oral Daily     fish oil-omega-3 fatty acids  1 g Oral BID     gabapentin  600 mg Oral 4x Daily     [START ON 9/4/2018] lamoTRIgine  100 mg Oral BID     lamoTRIgine (LaMICtal) tablet 50 mg  50 mg Oral BID     " "loratadine  10 mg Oral Daily     nicotine  1 patch Transdermal Daily     nicotine   Transdermal Q8H     nicotine   Transdermal Daily     PNV PRENATAL PLUS MULTIVITAMIN  1 tablet Oral Daily     pyridOXINE  25 mg Oral Daily     ranitidine  150 mg Oral BID     risperiDONE microspheres  50 mg Intramuscular Q14 Days     saccharomyces boulardii  250 mg Oral BID     acetaminophen, alum & mag hydroxide-simethicone, amantadine, diphenhydrAMINE, gabapentin, hydrOXYzine (VISTARIL) capsule  mg, LORazepam, magnesium hydroxide, mineral oil-hydrophilic petrolatum, nicotine polacrilex, risperiDONE, selenium sulfide          Allergies:     Allergies   Allergen Reactions     Azithromycin Anaphylaxis     Abilify [Aripiprazole] Unknown     Wellbutrin [Bupropion] Other (See Comments)     suicidal     Zithromax [Azithromycin Dihydrate]           Psychiatric Examination:   /65  Pulse 102  Temp 97.2  F (36.2  C) (Tympanic)  Resp 16  Ht 1.651 m (5' 5\")  Wt 100.2 kg (220 lb 14.4 oz)  SpO2 97%  BMI 36.76 kg/m2  Weight is 220 lbs 14.4 oz  Body mass index is 36.76 kg/(m^2).    Appearance:  awake, alert, adequately groomed and dressed in hospital scrubs  Attitude:  cooperative  Eye Contact:  good  Mood:  anxious and depressed  Affect:  mood congruent, intensity is heightened and intensity is dramatic  Speech:  clear, coherent   Psychomotor Behavior:  no evidence of tardive dyskinesia, dystonia, or tics and intact station, gait and muscle tone  Thought Process:  disorganized, tangental and although better  Associations:  loosening of associations present  Thought Content:  no evidence of suicidal ideation or homicidal ideation  Insight:  fair  Judgment:  fair  Oriented to:  time, person, and place  Attention Span and Concentration:  fair  Recent and Remote Memory:  fair  Fund of Knowledge: appropriate  Muscle Strength and Tone: normal  Gait and Station: Normal  Perception: no perceptual disturbance noted         Labs: "     Recent Results (from the past 24 hour(s))   CBC with platelets differential    Collection Time: 08/30/18  6:16 AM   Result Value Ref [facility]    WBC 7.9 4.0 - 11.0 10e9/L    RBC Count 4.16 3.8 - 5.2 10e12/L    Hemoglobin 11.5 (L) 11.7 - 15.7 g/dL    Hematocrit 34.6 (L) 35.0 - 47.0 %    MCV 83 78 - 100 fl    MCH 27.6 26.5 - 33.0 pg    MCHC 33.2 31.5 - 36.5 g/dL    RDW 17.6 (H) 10.0 - 15.0 %    Platelet Count 381 150 - 450 10e9/L    Diff Method Automated Method     % Neutrophils 51.0 %    % Lymphocytes 38.7 %    % Monocytes 8.8 %    % Eosinophils 0.0 %    % Basophils 0.4 %    % Immature Granulocytes 1.1 %    Nucleated RBCs 0 0 /100    Absolute Neutrophil 4.0 1.6 - 8.3 10e9/L    Absolute Lymphocytes 3.1 0.8 - 5.3 10e9/L    Absolute Monocytes 0.7 0.0 - 1.3 10e9/L    Absolute Eosinophils 0.0 0.0 - 0.7 10e9/L    Absolute Basophils 0.0 0.0 - 0.2 10e9/L    Abs Immature Granulocytes 0.1 0 - 0.4 10e9/L    Absolute Nucleated RBC 0.0          Assessment/ Plan:       No changes in meds made today. lamictal was just increased.   Continue Inpatient Hospitalization  Continue to provide a safe environment and therapeutic milieu.  Change Risperdal M tabs 1 mg from scheduled to increased prn dose as patient reports increased agitation with scheduled doses. Next Consta due next week.  Lamictal to be increased 8/28 and 9/4    Recommend psychotherapy    ELOS:  >5 days due to completion of cancer infusions on 8/30/18  Discharge to safe, supportive environment.   Recommend close to [facility] so can continue Cancer treatments along with support groups for low anxiety.

## 2018-08-30 NOTE — PLAN OF CARE
Face to face end of shift report received from Geo MEDRANO. Rounding completed. Patient observed.     Angie Savage  8/29/2018  11:40 PM

## 2018-08-30 NOTE — PLAN OF CARE
Problem: Patient Care Overview  Goal: Individualization & Mutuality  Patient will be compliant with treatment team recommendations.  Patient will report at least 6-8 hours of sleep each night.    8/21/18 Patient may keep wet wipes in her bathroom per patient care order for proper hygiene after chemo treatments.  Patient will attend groups.   Patient is unable to have a second scrub jacket as patient has been using jackets inappropriately.    Outcome: Improving  Pt is up on the unit playing cards with peers initially. She is laughing and joking with others. Pt does report Anxiety, depression but denies SI, HI, and hallucinations at this time. She eats well at dinner. Pt is compliant with medications. She does not attend groups she stays in the [facility] . When peers go to group pt then lays down in her bed or paces in the hallway with headphones. Pt is cooperative with nursing assessment. She is compliant with medications. She does approach nurse and requests Ativan for anxiety at 1735. Pt does continue to pace the hallway but lays down shortly after receiving Ativan. Pt continues to use wipes in her room she does dispose of them in her trash.     Problem: Coping, Compromised Individual (Adult,Obstetrics,Pediatric)  Goal: Effective Coping  Patient will apply her coping skills, actively deal with grief issues, and talk with nursing staff about her issues in an appropriate manner.   Outcome: Improving  Pt does report she is using coping skills by pacing and listening to music. Using distraction of playing cards.

## 2018-08-30 NOTE — PHARMACY
[facility]    Pharmacy      Antimicrobial Stewardship Note     Current antimicrobial therapy:  Anti-infectives (Future)    Start     Dose/Rate Route Frequency Ordered Stop    08/29/18 1400  cephalexin (KEFLEX) capsule 250 mg      250 mg Oral EVERY 8 HOURS SCHEDULED 08/29/18 1006 09/03/18 1359          Indication: SSTI    Days of Therapy: 7     Pertinent labs:  Creatinine   Creatinine   Date Value Ref Range Status   08/24/2018 0.46 (L) 0.52 - 1.04 mg/dL Final   08/20/2018 0.35 (L) 0.52 - 1.04 mg/dL Final   08/13/2018 0.44 (L) 0.52 - 1.04 mg/dL Final     WBC   WBC   Date Value Ref Range Status   08/30/2018 7.9 4.0 - 11.0 10e9/L Final   08/27/2018 9.4 4.0 - 11.0 10e9/L Final   08/24/2018 9.5 4.0 - 11.0 10e9/L Final     Procalcitonin   Procalcitonin   Date Value Ref Range Status   08/24/2018 <0.05 ng/ml Final     Comment:     <0.05 ng/ml  Normal  Recommendation: Very low risk of bacterial infection.   Discourage antibiotics unless strong clinical suspicion for serious infection.       CRP   CRP Inflammation   Date Value Ref Range Status   01/29/2016 8.2 (H) 0.0 - 8.0 mg/L Final       Culture Results:   7-Day Micro Results      Procedure Component Value Units Date/Time     Wound Culture Aerobic Bacterial      Order Status: No result Lab Status: No result      Specimen: Wound      Gram stain      Order Status: No result Lab Status: No result      Fluid Culture Aerobic Bacterial [[id number]] (Abnormal)  Collected: 08/27/18 1130     Order Status: Completed Lab Status: Final result Updated: 08/29/18 1258     Specimen: Breast aspirate from Breast, Right       Specimen Description Breast aspirate Right Breast      Culture Micro Heavy growth   Streptococcus agalactiae sero group B    (A)     Culture & Susceptibility      STREPTOCOCCUS AGALACTIAE SERO GROUP B      Antibiotic Interpretation Sensitivity Unit Method Status     AMPICILLIN Sensitive <=0.25 ug/mL BERNICE Final     CLINDAMYCIN Resistant 4 ug/mL BERNICE Final      LEVOFLOXACIN Sensitive 1 ug/mL BERNICE Final     PENICILLIN Sensitive <=0.12 ug/mL BERNICE Final     VANCOMYCIN Sensitive <=0.5 ug/mL BERNICE Final                           Gram stain [[id number]] (Abnormal) Collected: 08/27/18 1130     Order Status: Completed Lab Status: Final result Updated: 08/28/18 0740     Specimen: Breast aspirate       Specimen Description Breast aspirate Right Breast      Gram Stain Many   WBC'S seen          Few   Gram positive cocci    (A)     Blood culture [[id number]] Collected: 08/24/18 1150     Order Status: Completed Lab Status: Final result Updated: 08/30/18 0826     Specimen: Blood from [place]acat       Specimen Description Blood      Culture Micro No growth after 6 days              Recommendations/Interventions:  1. None at this time.    Alma Cannon, [facility]  August 30, 2018

## 2018-08-30 NOTE — PROGRESS NOTES
[facility]    Spiritual Health Progress Note    Date of Service (when I saw the patient): 08/30/2018     Assessment & Plan   Marti Javier is a 28 year old female who was admitted on 8/6/2018. Visit was a follow-up to check in with patient from previous visit. Sita seems to be a little more stable in her thinking today.  She explained that she has had a fear of death and had been occupied by it.  She stated that as she had been thinking more about it, she realized that her fear or worry was not going to help but most likely only make things worse.  She said she had a recent possibility of being discharged but was not sure when this would happen.    The patient seemed to be in a more thoughtful mood this time.  She told me she was reading more through Psalms and Proverbs and wanted to have more wisdom.  The one more unusual thought she expressed was that she thought she might be Yazidism.  But not just Yazidism, a Messianic Confucianism, one who believes in [place].  The  wasn't sure where this thought was coming from.      Overall, generally in an even mood with mostly coherent thoughts.  We closed our time in prayer.    Rev. Kenn Savage  Volunteer

## 2018-08-30 NOTE — PLAN OF CARE
Face to face end of shift report received from Chuy MOYA RN. Rounding completed. Patient observed in room.     Meghana Garza  8/30/2018  4:20 PM

## 2018-08-30 NOTE — PLAN OF CARE
Problem: Patient Care Overview  Goal: Individualization & Mutuality  Patient will be compliant with treatment team recommendations.  Patient will report at least 6-8 hours of sleep each night.    8/21/18 Patient may keep wet wipes in her bathroom per patient care order for proper hygiene after chemo treatments.  Patient will attend groups.   Patient is unable to have a second scrub jacket as patient has been using jackets inappropriately.    Pt appeared to be sleeping at the beginning of this shift, normal respirations and position changes noted. Pt awake and walking hallways at 0030. Pt given Tylenol 650 mg for 5/10 back pain, Ativan 1 mg, Benadryl 50 mg at 0125 per request for anxiety and insomnia. Pt appeared to return to sleep from [id number].

## 2018-08-31 PROCEDURE — A9270 NON-COVERED ITEM OR SERVICE: HCPCS | Mod: GY | Performed by: NURSE PRACTITIONER

## 2018-08-31 PROCEDURE — 12400011

## 2018-08-31 PROCEDURE — 25000132 ZZH RX MED GY IP 250 OP 250 PS 637: Mod: GY | Performed by: SURGERY

## 2018-08-31 PROCEDURE — 25000132 ZZH RX MED GY IP 250 OP 250 PS 637: Mod: GY | Performed by: NURSE PRACTITIONER

## 2018-08-31 PROCEDURE — A9270 NON-COVERED ITEM OR SERVICE: HCPCS | Mod: GY | Performed by: SURGERY

## 2018-08-31 RX ADMIN — Medication 25 MG: at 08:25

## 2018-08-31 RX ADMIN — NICOTINE POLACRILEX 4 MG: 2 GUM, CHEWING ORAL at 08:25

## 2018-08-31 RX ADMIN — LORAZEPAM 1 MG: 1 TABLET ORAL at 09:26

## 2018-08-31 RX ADMIN — NICOTINE POLACRILEX 4 MG: 2 GUM, CHEWING ORAL at 17:10

## 2018-08-31 RX ADMIN — CEPHALEXIN 250 MG: 250 CAPSULE ORAL at 06:23

## 2018-08-31 RX ADMIN — Medication 1 G: at 08:24

## 2018-08-31 RX ADMIN — RANITIDINE 150 MG: 150 TABLET ORAL at 20:46

## 2018-08-31 RX ADMIN — SELENIUM SULFIDE 2 ML: 10 SHAMPOO TOPICAL at 09:07

## 2018-08-31 RX ADMIN — VITAMIN A, VITAMIN C, VITAMIN D-3, VITAMIN E, VITAMIN B-1, VITAMIN B-2, NIACIN, VITAMIN B-6, CALCIUM, IRON, ZINC, COPPER 1 TABLET: 4000; 120; 400; 22; 1.84; 3; 20; 10; 1; 12; 200; 27; 25; 2 TABLET ORAL at 08:26

## 2018-08-31 RX ADMIN — RISPERIDONE 1 MG: 1 TABLET, ORALLY DISINTEGRATING ORAL at 09:26

## 2018-08-31 RX ADMIN — NICOTINE 1 PATCH: 21 PATCH, EXTENDED RELEASE TRANSDERMAL at 08:24

## 2018-08-31 RX ADMIN — GABAPENTIN 300 MG: 300 CAPSULE ORAL at 12:22

## 2018-08-31 RX ADMIN — NICOTINE POLACRILEX 4 MG: 2 GUM, CHEWING ORAL at 12:20

## 2018-08-31 RX ADMIN — NICOTINE POLACRILEX 4 MG: 2 GUM, CHEWING ORAL at 14:04

## 2018-08-31 RX ADMIN — LORATADINE 10 MG: 10 TABLET ORAL at 08:25

## 2018-08-31 RX ADMIN — LAMOTRIGINE 50 MG: 25 TABLET ORAL at 08:26

## 2018-08-31 RX ADMIN — WHITE PETROLATUM: 1.75 OINTMENT TOPICAL at 08:26

## 2018-08-31 RX ADMIN — NICOTINE POLACRILEX 4 MG: 2 GUM, CHEWING ORAL at 06:23

## 2018-08-31 RX ADMIN — NICOTINE POLACRILEX 4 MG: 2 GUM, CHEWING ORAL at 09:30

## 2018-08-31 RX ADMIN — CELECOXIB 200 MG: 100 CAPSULE ORAL at 08:25

## 2018-08-31 RX ADMIN — Medication 250 MG: at 08:26

## 2018-08-31 RX ADMIN — AMANTADINE 100 MG: 100 CAPSULE ORAL at 20:46

## 2018-08-31 RX ADMIN — GABAPENTIN 600 MG: 300 CAPSULE ORAL at 20:47

## 2018-08-31 RX ADMIN — CLONIDINE HYDROCHLORIDE 0.1 MG: 0.1 TABLET ORAL at 14:04

## 2018-08-31 RX ADMIN — CEPHALEXIN 250 MG: 250 CAPSULE ORAL at 21:24

## 2018-08-31 RX ADMIN — Medication 250 MG: at 20:55

## 2018-08-31 RX ADMIN — CYANOCOBALAMIN TAB 1000 MCG 1000 MCG: 1000 TAB at 08:25

## 2018-08-31 RX ADMIN — CELECOXIB 200 MG: 100 CAPSULE ORAL at 20:46

## 2018-08-31 RX ADMIN — CEPHALEXIN 250 MG: 250 CAPSULE ORAL at 14:04

## 2018-08-31 RX ADMIN — GABAPENTIN 600 MG: 300 CAPSULE ORAL at 12:20

## 2018-08-31 RX ADMIN — NICOTINE POLACRILEX 4 MG: 2 GUM, CHEWING ORAL at 20:55

## 2018-08-31 RX ADMIN — RANITIDINE 150 MG: 150 TABLET ORAL at 08:25

## 2018-08-31 RX ADMIN — VITAMIN D, TAB 1000IU (100/BT) 2000 UNITS: 25 TAB at 08:25

## 2018-08-31 RX ADMIN — LORAZEPAM 1 MG: 1 TABLET ORAL at 17:18

## 2018-08-31 RX ADMIN — CLONIDINE HYDROCHLORIDE 0.1 MG: 0.1 TABLET ORAL at 20:47

## 2018-08-31 RX ADMIN — LAMOTRIGINE 50 MG: 25 TABLET ORAL at 20:48

## 2018-08-31 RX ADMIN — AMANTADINE 100 MG: 100 CAPSULE ORAL at 08:26

## 2018-08-31 RX ADMIN — GABAPENTIN 600 MG: 300 CAPSULE ORAL at 08:24

## 2018-08-31 RX ADMIN — GABAPENTIN 600 MG: 300 CAPSULE ORAL at 17:37

## 2018-08-31 RX ADMIN — CLONIDINE HYDROCHLORIDE 0.1 MG: 0.1 TABLET ORAL at 08:26

## 2018-08-31 RX ADMIN — Medication 1 G: at 20:46

## 2018-08-31 ASSESSMENT — ACTIVITIES OF DAILY LIVING (ADL)
ORAL_HYGIENE: INDEPENDENT
DRESS: SCRUBS (BEHAVIORAL HEALTH);INDEPENDENT
GROOMING: INDEPENDENT
GROOMING: INDEPENDENT
ORAL_HYGIENE: INDEPENDENT
DRESS: INDEPENDENT
LAUNDRY: UNABLE TO COMPLETE

## 2018-08-31 NOTE — PHARMACY
[facility]    Pharmacy      Antimicrobial Stewardship Note     Current antimicrobial therapy:  Anti-infectives (Future)    Start     Dose/Rate Route Frequency Ordered Stop    08/29/18 1400  cephalexin (KEFLEX) capsule 250 mg      250 mg Oral EVERY 8 HOURS SCHEDULED 08/29/18 1006 09/03/18 1359          Indication: SSTI    Days of Therapy: 8 (stop date of 9/3/18)     Pertinent labs:  Creatinine   Creatinine   Date Value Ref Range Status   08/24/2018 0.46 (L) 0.52 - 1.04 mg/dL Final   08/20/2018 0.35 (L) 0.52 - 1.04 mg/dL Final   08/13/2018 0.44 (L) 0.52 - 1.04 mg/dL Final     WBC   WBC   Date Value Ref Range Status   08/30/2018 7.9 4.0 - 11.0 10e9/L Final   08/27/2018 9.4 4.0 - 11.0 10e9/L Final   08/24/2018 9.5 4.0 - 11.0 10e9/L Final     Procalcitonin   Procalcitonin   Date Value Ref Range Status   08/24/2018 <0.05 ng/ml Final     Comment:     <0.05 ng/ml  Normal  Recommendation: Very low risk of bacterial infection.   Discourage antibiotics unless strong clinical suspicion for serious infection.       CRP   CRP Inflammation   Date Value Ref Range Status   01/29/2016 8.2 (H) 0.0 - 8.0 mg/L Final       Culture Results:      Recommendations/Interventions:  1. none    Toya Savage RPH  August 31, 2018

## 2018-08-31 NOTE — PLAN OF CARE
Problem: Patient Care Overview  Goal: Individualization & Mutuality  Patient will be compliant with treatment team recommendations.  Patient will report at least 6-8 hours of sleep each night.    8/21/18 Patient may keep wet wipes in her bathroom per patient care order for proper hygiene after chemo treatments.  Patient will attend groups.   Patient is unable to have a second scrub jacket as patient has been using jackets inappropriately.    Pt appeared to be sleeping most of this shift, normal respirations and position changes noted.

## 2018-08-31 NOTE — PLAN OF CARE
Face to face end of shift report received from Chuy MEDRANO. Rounding completed. Patient observed. Pt in lobby with peers.    SIRISHA GONCALVES  8/31/2018  4:05 PM

## 2018-08-31 NOTE — PLAN OF CARE
Problem: Patient Care Overview  Goal: Individualization & Mutuality  Patient will be compliant with treatment team recommendations.  Patient will report at least 6-8 hours of sleep each night.    8/21/18 Patient may keep wet wipes in her bathroom per patient care order for proper hygiene after chemo treatments.  Patient will attend groups.   Patient is unable to have a second scrub jacket as patient has been using jackets inappropriately.    Outcome: Improving  Pt has been up and active writing in her journal, attending some of the groups, and walking the halls listing to music.  She has been very polite and cheerful today. She told me that she feels like things are going well and she has a positive outlook.  She used Alpha Stim once today and said that it helped her a great deal. She received PRN Ativan and Risperdal at 0925 and 300 mg of Gabapentin at 1220.  Pt took a nap after lunch.    Problem: Coping, Compromised Individual (Adult,Obstetrics,Pediatric)  Goal: Effective Coping  Patient will apply her coping skills, actively deal with grief issues, and talk with nursing staff about her issues in an appropriate manner.   Outcome: Improving  Pt has applied all of her coping skills and is doing well

## 2018-08-31 NOTE — PLAN OF CARE
Problem: Patient Care Overview  Goal: Discharge Needs Assessment  Outcome: Improving  Emailed pt's CM Angie this morning regarding update on discharge plan of [facility].      Spoke with pt's CM Angie who states [facility] will provider her with an update on the referral sometime next week. Angie also states she does not agree with pt moving to [place] or staying at [facility] until there is an opening- as pt had requested.

## 2018-08-31 NOTE — PLAN OF CARE
Problem: Patient Care Overview  Goal: Team Discussion  Team Plan:   BEHAVIORAL TEAM DISCUSSION    Participants:  Haily Tomlinson NP, Amberly Sommers NP, Renetta Quinteros LSW, Venus Lyons RN, Chuy Quijano RN, Marilyn Irvin OT, Haily Fuchs OT  Progress: fair, brighter  Continued Stay Criteria/Rationale: unable to DC to lesser level of care due to high risk of hospital readmission, finish chemo  Medical/Physical: active cancer - chemo, resolving port infection  Precautions:   Behavioral Orders   Procedures     Code 1 - Restrict to Unit     Routine Programming     As clinically indicated     Status 15     Every 15 minutes.     Plan: DC to [facility] when available  Rationale for change in precautions or plan: none

## 2018-08-31 NOTE — PLAN OF CARE
Face to face end of shift report received from Angie MEDRANO. Rounding completed. Patient observed in dayroom and introduced to nursing for the shift    Chuy Quijano  8/31/2018  7:53 AM

## 2018-08-31 NOTE — PLAN OF CARE
Face to face end of shift report received from Meghana MEDRANO. Rounding completed. Patient observed.     Angie Savage  8/30/2018  11:29 PM

## 2018-08-31 NOTE — PLAN OF CARE
Problem: Patient Care Overview  Goal: Individualization & Mutuality  Patient will be compliant with treatment team recommendations.  Patient will report at least 6-8 hours of sleep each night.    8/21/18 Patient may keep wet wipes in her bathroom per patient care order for proper hygiene after chemo treatments.  Patient will attend groups.   Patient is unable to have a second scrub jacket as patient has been using jackets inappropriately.    Outcome: Improving  Patient has been calm, cooperative, and medication compliant this shift.  She has been polite and friendly with staff today and has had a bright affect.  She complained of some depression and anxiety and received 100 mg Vistaril at 1650.  Attended 1 group.  No complaints of pain.  VS WNL.     Problem: Coping, Compromised Individual (Adult,Obstetrics,Pediatric)  Goal: Effective Coping  Patient will apply her coping skills, actively deal with grief issues, and talk with nursing staff about her issues in an appropriate manner.   Outcome: Improving  Patient states she has been using her coping skills this shift.  She has been listening to music in her room and meditating.

## 2018-09-01 PROCEDURE — 99232 SBSQ HOSP IP/OBS MODERATE 35: CPT | Performed by: NURSE PRACTITIONER

## 2018-09-01 PROCEDURE — A9270 NON-COVERED ITEM OR SERVICE: HCPCS | Mod: GY | Performed by: NURSE PRACTITIONER

## 2018-09-01 PROCEDURE — A9270 NON-COVERED ITEM OR SERVICE: HCPCS | Mod: GY | Performed by: SURGERY

## 2018-09-01 PROCEDURE — 12400011

## 2018-09-01 PROCEDURE — 25000132 ZZH RX MED GY IP 250 OP 250 PS 637: Mod: GY | Performed by: SURGERY

## 2018-09-01 PROCEDURE — 25000132 ZZH RX MED GY IP 250 OP 250 PS 637: Mod: GY | Performed by: NURSE PRACTITIONER

## 2018-09-01 RX ORDER — LAMOTRIGINE 25 MG/1
50 TABLET ORAL AT BEDTIME
Status: DISCONTINUED | OUTPATIENT
Start: 2018-09-01 | End: 2018-09-06

## 2018-09-01 RX ORDER — LAMOTRIGINE 25 MG/1
75 TABLET ORAL DAILY
Status: DISCONTINUED | OUTPATIENT
Start: 2018-09-02 | End: 2018-09-06

## 2018-09-01 RX ADMIN — LORAZEPAM 1 MG: 1 TABLET ORAL at 06:28

## 2018-09-01 RX ADMIN — HYDROXYZINE PAMOATE 50 MG: 50 CAPSULE ORAL at 08:46

## 2018-09-01 RX ADMIN — LAMOTRIGINE 50 MG: 25 TABLET ORAL at 21:18

## 2018-09-01 RX ADMIN — Medication 1 G: at 21:49

## 2018-09-01 RX ADMIN — RANITIDINE 150 MG: 150 TABLET ORAL at 21:18

## 2018-09-01 RX ADMIN — CEPHALEXIN 250 MG: 250 CAPSULE ORAL at 21:19

## 2018-09-01 RX ADMIN — NICOTINE 1 PATCH: 21 PATCH, EXTENDED RELEASE TRANSDERMAL at 08:47

## 2018-09-01 RX ADMIN — LAMOTRIGINE 50 MG: 25 TABLET ORAL at 08:45

## 2018-09-01 RX ADMIN — Medication 1 G: at 08:45

## 2018-09-01 RX ADMIN — Medication 25 MG: at 09:25

## 2018-09-01 RX ADMIN — NICOTINE POLACRILEX 4 MG: 2 GUM, CHEWING ORAL at 17:41

## 2018-09-01 RX ADMIN — NICOTINE POLACRILEX 4 MG: 2 GUM, CHEWING ORAL at 14:14

## 2018-09-01 RX ADMIN — GABAPENTIN 600 MG: 300 CAPSULE ORAL at 12:55

## 2018-09-01 RX ADMIN — CLONIDINE HYDROCHLORIDE 0.1 MG: 0.1 TABLET ORAL at 08:54

## 2018-09-01 RX ADMIN — RISPERIDONE 1 MG: 1 TABLET, ORALLY DISINTEGRATING ORAL at 12:55

## 2018-09-01 RX ADMIN — LORATADINE 10 MG: 10 TABLET ORAL at 08:45

## 2018-09-01 RX ADMIN — Medication 250 MG: at 21:19

## 2018-09-01 RX ADMIN — AMANTADINE 100 MG: 100 CAPSULE ORAL at 21:18

## 2018-09-01 RX ADMIN — CELECOXIB 200 MG: 100 CAPSULE ORAL at 21:18

## 2018-09-01 RX ADMIN — NICOTINE POLACRILEX 4 MG: 2 GUM, CHEWING ORAL at 15:44

## 2018-09-01 RX ADMIN — HYDROXYZINE PAMOATE 100 MG: 50 CAPSULE ORAL at 12:55

## 2018-09-01 RX ADMIN — RANITIDINE 150 MG: 150 TABLET ORAL at 08:45

## 2018-09-01 RX ADMIN — NICOTINE POLACRILEX 4 MG: 2 GUM, CHEWING ORAL at 21:19

## 2018-09-01 RX ADMIN — GABAPENTIN 600 MG: 300 CAPSULE ORAL at 08:46

## 2018-09-01 RX ADMIN — CEPHALEXIN 250 MG: 250 CAPSULE ORAL at 14:14

## 2018-09-01 RX ADMIN — GABAPENTIN 600 MG: 300 CAPSULE ORAL at 17:39

## 2018-09-01 RX ADMIN — NICOTINE POLACRILEX 4 MG: 2 GUM, CHEWING ORAL at 06:28

## 2018-09-01 RX ADMIN — CEPHALEXIN 250 MG: 250 CAPSULE ORAL at 06:28

## 2018-09-01 RX ADMIN — VITAMIN D, TAB 1000IU (100/BT) 2000 UNITS: 25 TAB at 08:45

## 2018-09-01 RX ADMIN — GABAPENTIN 600 MG: 300 CAPSULE ORAL at 21:19

## 2018-09-01 RX ADMIN — NICOTINE POLACRILEX 4 MG: 2 GUM, CHEWING ORAL at 12:59

## 2018-09-01 RX ADMIN — NICOTINE POLACRILEX 4 MG: 2 GUM, CHEWING ORAL at 04:29

## 2018-09-01 RX ADMIN — NICOTINE POLACRILEX 2 MG: 2 GUM, CHEWING ORAL at 08:46

## 2018-09-01 RX ADMIN — AMANTADINE 100 MG: 100 CAPSULE ORAL at 08:45

## 2018-09-01 RX ADMIN — GABAPENTIN 300 MG: 300 CAPSULE ORAL at 08:46

## 2018-09-01 RX ADMIN — CELECOXIB 200 MG: 100 CAPSULE ORAL at 08:45

## 2018-09-01 RX ADMIN — CLONIDINE HYDROCHLORIDE 0.1 MG: 0.1 TABLET ORAL at 21:19

## 2018-09-01 RX ADMIN — CLONIDINE HYDROCHLORIDE 0.1 MG: 0.1 TABLET ORAL at 14:14

## 2018-09-01 RX ADMIN — LORAZEPAM 1 MG: 1 TABLET ORAL at 17:39

## 2018-09-01 RX ADMIN — WHITE PETROLATUM: 1.75 OINTMENT TOPICAL at 08:45

## 2018-09-01 RX ADMIN — Medication 250 MG: at 08:45

## 2018-09-01 RX ADMIN — VITAMIN A, VITAMIN C, VITAMIN D-3, VITAMIN E, VITAMIN B-1, VITAMIN B-2, NIACIN, VITAMIN B-6, CALCIUM, IRON, ZINC, COPPER 1 TABLET: 4000; 120; 400; 22; 1.84; 3; 20; 10; 1; 12; 200; 27; 25; 2 TABLET ORAL at 08:46

## 2018-09-01 RX ADMIN — CYANOCOBALAMIN TAB 1000 MCG 1000 MCG: 1000 TAB at 08:45

## 2018-09-01 ASSESSMENT — ACTIVITIES OF DAILY LIVING (ADL)
GROOMING: INDEPENDENT
LAUNDRY: UNABLE TO COMPLETE
DRESS: SCRUBS (BEHAVIORAL HEALTH);INDEPENDENT
DRESS: SCRUBS (BEHAVIORAL HEALTH);INDEPENDENT
GROOMING: INDEPENDENT
ORAL_HYGIENE: INDEPENDENT
ORAL_HYGIENE: INDEPENDENT
LAUNDRY: UNABLE TO COMPLETE

## 2018-09-01 NOTE — PLAN OF CARE
Problem: Patient Care Overview  Goal: Individualization & Mutuality  Patient will be compliant with treatment team recommendations.  Patient will report at least 6-8 hours of sleep each night.    8/21/18 Patient may keep wet wipes in her bathroom per patient care order for proper hygiene after chemo treatments.  Patient will attend groups.   Patient is unable to have a second scrub jacket as patient has been using jackets inappropriately.    Outcome: Improving  Pt was up and active this morning sitting in the dayroom writing in her journal while listening to music. She talked with me about the conversation with her son last night and how it brought her sandrine as well as triggered her guilt and shame issues. She is struggling to let go of these feelings and forgive herself. She had PRN Gabapentin and Visteril 50 with her AM medications. She also used the Alpha Stim twice before lunch. She did some reading in her Bahai reader and found a passage that helped her let go and accept the present. She smiled some today and appears to be trying very hard to work on her issues and feeling in an appropriate manner.    Problem: Coping, Compromised Individual (Adult,Obstetrics,Pediatric)  Goal: Effective Coping  Patient will apply her coping skills, actively deal with grief issues, and talk with nursing staff about her issues in an appropriate manner.   Outcome: Improving  Pt has talked with staff about her struggles and used her coping skills effectively this morning

## 2018-09-01 NOTE — PLAN OF CARE
Face to face end of shift report received from Nasima MEDRANO. Rounding completed. Patient observed.     Angie Savage  8/31/2018  11:43 PM

## 2018-09-01 NOTE — PLAN OF CARE
Problem: Patient Care Overview  Goal: Individualization & Mutuality  Patient will be compliant with treatment team recommendations.  Patient will report at least 6-8 hours of sleep each night.    8/21/18 Patient may keep wet wipes in her bathroom per patient care order for proper hygiene after chemo treatments.  Patient will attend groups.   Patient is unable to have a second scrub jacket as patient has been using jackets inappropriately.    Pt appeared to be sleeping most of this shift, normal respirations and position changes noted. Pt given Ativan 1 mg per request for anxiety at 0628.

## 2018-09-01 NOTE — PHARMACY
[facility]    Pharmacy    Antimicrobial Stewardship Note     Current antimicrobial therapy:  Anti-infectives (Future)    Start     Dose/Rate Route Frequency Ordered Stop    08/29/18 1400  cephalexin (KEFLEX) capsule 250 mg      250 mg Oral EVERY 8 HOURS SCHEDULED 08/29/18 1006 09/03/18 7029        Indication: SSTI    Days of Therapy: 9 (stop date of 9/3/18)     Pertinent labs:  Creatinine   Creatinine   Date Value Ref Range Status   08/24/2018 0.46 (L) 0.52 - 1.04 mg/dL Final   08/20/2018 0.35 (L) 0.52 - 1.04 mg/dL Final   08/13/2018 0.44 (L) 0.52 - 1.04 mg/dL Final     WBC   WBC   Date Value Ref Range Status   08/30/2018 7.9 4.0 - 11.0 10e9/L Final   08/27/2018 9.4 4.0 - 11.0 10e9/L Final   08/24/2018 9.5 4.0 - 11.0 10e9/L Final     Procalcitonin   Procalcitonin   Date Value Ref Range Status   08/24/2018 <0.05 ng/ml Final     Comment:     <0.05 ng/ml  Normal  Recommendation: Very low risk of bacterial infection.   Discourage antibiotics unless strong clinical suspicion for serious infection.       CRP   CRP Inflammation   Date Value Ref Range Status   01/29/2016 8.2 (H) 0.0 - 8.0 mg/L Final     Culture Results:   7-Day Micro Results      Procedure Component Value Units Date/Time     Wound Culture Aerobic Bacterial      Order Status: No result Lab Status: No result      Specimen: Wound      Gram stain      Order Status: No result Lab Status: No result      Fluid Culture Aerobic Bacterial [[id number]] (Abnormal)  Collected: 08/27/18 1130     Order Status: Completed Lab Status: Final result Updated: 08/29/18 1258     Specimen: Breast aspirate from Breast, Right       Specimen Description Breast aspirate Right Breast      Culture Micro Heavy growth   Streptococcus agalactiae sero group B    (A)     Culture & Susceptibility      STREPTOCOCCUS AGALACTIAE SERO GROUP B      Antibiotic Interpretation Sensitivity Unit Method Status     AMPICILLIN Sensitive <=0.25 ug/mL BERNICE Final     CLINDAMYCIN Resistant 4 ug/mL BERNICE  Final     LEVOFLOXACIN Sensitive 1 ug/mL BERNICE Final     PENICILLIN Sensitive <=0.12 ug/mL BERNICE Final     VANCOMYCIN Sensitive <=0.5 ug/mL BERNICE Final                           Gram stain [[id number]] (Abnormal) Collected: 08/27/18 1130     Order Status: Completed Lab Status: Final result Updated: 08/28/18 0719     Specimen: Breast aspirate       Specimen Description Breast aspirate Right Breast      Gram Stain Many   WBC'S seen          Few   Gram positive cocci    (A)     Recommendations/Interventions:  1. None at this time.     Ana Santos RPH  September 1, 2018

## 2018-09-01 NOTE — PLAN OF CARE
Problem: Patient Care Overview  Goal: Individualization & Mutuality  Patient will be compliant with treatment team recommendations.  Patient will report at least 6-8 hours of sleep each night.    8/21/18 Patient may keep wet wipes in her bathroom per patient care order for proper hygiene after chemo treatments.  Patient will attend groups.   Patient is unable to have a second scrub jacket as patient has been using jackets inappropriately.    Outcome: Improving  Pt denied depression, was medicated for anxiety 5/10 with ativan 1 mg and had relief. Also uses alpha-stim with relief of anxiety. Pt gets along with peers and attends some groups of her choosing. Cooperative and agreeable with plan of care. Asks for information on alternative relaxation techniques. Has not been using clothing inappropriately.

## 2018-09-01 NOTE — PLAN OF CARE
Face to face end of shift report received from Angie MEDRANO. Rounding completed. Patient observed in dayroom and introduced to nursing for the shift    Chuy Quijano  9/1/2018  7:59 AM

## 2018-09-02 PROCEDURE — 12400011

## 2018-09-02 PROCEDURE — 25000132 ZZH RX MED GY IP 250 OP 250 PS 637: Mod: GY | Performed by: SURGERY

## 2018-09-02 PROCEDURE — A9270 NON-COVERED ITEM OR SERVICE: HCPCS | Mod: GY | Performed by: SURGERY

## 2018-09-02 PROCEDURE — 25000132 ZZH RX MED GY IP 250 OP 250 PS 637: Mod: GY | Performed by: NURSE PRACTITIONER

## 2018-09-02 PROCEDURE — A9270 NON-COVERED ITEM OR SERVICE: HCPCS | Mod: GY | Performed by: NURSE PRACTITIONER

## 2018-09-02 PROCEDURE — 99232 SBSQ HOSP IP/OBS MODERATE 35: CPT | Performed by: NURSE PRACTITIONER

## 2018-09-02 RX ADMIN — NICOTINE 1 PATCH: 21 PATCH, EXTENDED RELEASE TRANSDERMAL at 08:34

## 2018-09-02 RX ADMIN — LAMOTRIGINE 50 MG: 25 TABLET ORAL at 20:31

## 2018-09-02 RX ADMIN — AMANTADINE 100 MG: 100 CAPSULE ORAL at 08:36

## 2018-09-02 RX ADMIN — NICOTINE POLACRILEX 4 MG: 2 GUM, CHEWING ORAL at 18:53

## 2018-09-02 RX ADMIN — VITAMIN A, VITAMIN C, VITAMIN D-3, VITAMIN E, VITAMIN B-1, VITAMIN B-2, NIACIN, VITAMIN B-6, CALCIUM, IRON, ZINC, COPPER 1 TABLET: 4000; 120; 400; 22; 1.84; 3; 20; 10; 1; 12; 200; 27; 25; 2 TABLET ORAL at 08:35

## 2018-09-02 RX ADMIN — CELECOXIB 200 MG: 100 CAPSULE ORAL at 08:35

## 2018-09-02 RX ADMIN — GABAPENTIN 300 MG: 300 CAPSULE ORAL at 13:33

## 2018-09-02 RX ADMIN — CLONIDINE HYDROCHLORIDE 0.1 MG: 0.1 TABLET ORAL at 08:36

## 2018-09-02 RX ADMIN — RISPERIDONE 1 MG: 1 TABLET, ORALLY DISINTEGRATING ORAL at 13:33

## 2018-09-02 RX ADMIN — VITAMIN D, TAB 1000IU (100/BT) 2000 UNITS: 25 TAB at 08:35

## 2018-09-02 RX ADMIN — LAMOTRIGINE 75 MG: 25 TABLET ORAL at 08:36

## 2018-09-02 RX ADMIN — CLONIDINE HYDROCHLORIDE 0.1 MG: 0.1 TABLET ORAL at 20:32

## 2018-09-02 RX ADMIN — RANITIDINE 150 MG: 150 TABLET ORAL at 08:35

## 2018-09-02 RX ADMIN — GABAPENTIN 600 MG: 300 CAPSULE ORAL at 12:18

## 2018-09-02 RX ADMIN — HYDROXYZINE PAMOATE 100 MG: 50 CAPSULE ORAL at 09:55

## 2018-09-02 RX ADMIN — AMANTADINE 100 MG: 100 CAPSULE ORAL at 20:32

## 2018-09-02 RX ADMIN — GABAPENTIN 300 MG: 300 CAPSULE ORAL at 09:55

## 2018-09-02 RX ADMIN — CEPHALEXIN 250 MG: 250 CAPSULE ORAL at 13:33

## 2018-09-02 RX ADMIN — NICOTINE POLACRILEX 4 MG: 2 GUM, CHEWING ORAL at 20:32

## 2018-09-02 RX ADMIN — GABAPENTIN 600 MG: 300 CAPSULE ORAL at 17:16

## 2018-09-02 RX ADMIN — Medication 1 G: at 20:32

## 2018-09-02 RX ADMIN — CLONIDINE HYDROCHLORIDE 0.1 MG: 0.1 TABLET ORAL at 13:33

## 2018-09-02 RX ADMIN — Medication 1 G: at 08:35

## 2018-09-02 RX ADMIN — HYDROXYZINE PAMOATE 100 MG: 50 CAPSULE ORAL at 13:33

## 2018-09-02 RX ADMIN — SELENIUM SULFIDE 3 ML: 10 SHAMPOO TOPICAL at 08:12

## 2018-09-02 RX ADMIN — Medication 250 MG: at 08:35

## 2018-09-02 RX ADMIN — GABAPENTIN 600 MG: 300 CAPSULE ORAL at 20:31

## 2018-09-02 RX ADMIN — Medication 250 MG: at 20:32

## 2018-09-02 RX ADMIN — RISPERIDONE 1 MG: 1 TABLET, ORALLY DISINTEGRATING ORAL at 09:55

## 2018-09-02 RX ADMIN — Medication 25 MG: at 08:35

## 2018-09-02 RX ADMIN — NICOTINE POLACRILEX 4 MG: 2 GUM, CHEWING ORAL at 04:28

## 2018-09-02 RX ADMIN — RISPERIDONE 1 MG: 1 TABLET, ORALLY DISINTEGRATING ORAL at 18:53

## 2018-09-02 RX ADMIN — NICOTINE POLACRILEX 4 MG: 2 GUM, CHEWING ORAL at 12:18

## 2018-09-02 RX ADMIN — CYANOCOBALAMIN TAB 1000 MCG 1000 MCG: 1000 TAB at 08:36

## 2018-09-02 RX ADMIN — LORAZEPAM 1 MG: 1 TABLET ORAL at 18:53

## 2018-09-02 RX ADMIN — RANITIDINE 150 MG: 150 TABLET ORAL at 20:32

## 2018-09-02 RX ADMIN — NICOTINE POLACRILEX 4 MG: 2 GUM, CHEWING ORAL at 13:33

## 2018-09-02 RX ADMIN — NICOTINE POLACRILEX 4 MG: 2 GUM, CHEWING ORAL at 06:29

## 2018-09-02 RX ADMIN — CEPHALEXIN 250 MG: 250 CAPSULE ORAL at 22:43

## 2018-09-02 RX ADMIN — LORATADINE 10 MG: 10 TABLET ORAL at 08:36

## 2018-09-02 RX ADMIN — NICOTINE POLACRILEX 4 MG: 2 GUM, CHEWING ORAL at 17:16

## 2018-09-02 RX ADMIN — NICOTINE POLACRILEX 4 MG: 2 GUM, CHEWING ORAL at 08:37

## 2018-09-02 RX ADMIN — CELECOXIB 200 MG: 100 CAPSULE ORAL at 20:32

## 2018-09-02 RX ADMIN — CEPHALEXIN 250 MG: 250 CAPSULE ORAL at 06:29

## 2018-09-02 RX ADMIN — DIPHENHYDRAMINE HYDROCHLORIDE 50 MG: 50 CAPSULE ORAL at 18:53

## 2018-09-02 RX ADMIN — WHITE PETROLATUM: 1.75 OINTMENT TOPICAL at 08:33

## 2018-09-02 RX ADMIN — GABAPENTIN 600 MG: 300 CAPSULE ORAL at 08:35

## 2018-09-02 RX ADMIN — LORAZEPAM 1 MG: 1 TABLET ORAL at 06:29

## 2018-09-02 ASSESSMENT — ACTIVITIES OF DAILY LIVING (ADL)
ORAL_HYGIENE: INDEPENDENT
GROOMING: INDEPENDENT
LAUNDRY: UNABLE TO COMPLETE
LAUNDRY: UNABLE TO COMPLETE
ORAL_HYGIENE: INDEPENDENT
DRESS: INDEPENDENT;SCRUBS (BEHAVIORAL HEALTH)
GROOMING: INDEPENDENT

## 2018-09-02 NOTE — PROGRESS NOTES
"[facility]  Psychiatric Progress Note    Subjective     Patient is sitting in commons area, looking at a book and journaling.  She tells me she is \"completely bored\".  She tells me that a therapist has not yet come to talk with her, remind her this is a Holiday weekend for most employees and she states that the nurses are really good about coming to talk with her.  Patient does affirm depressed mood as well and asks about Lamictal increase - which her doses were just increased yesterday and today.  Patient otherwise talks about the book she is trying to follow regarding Congregation and she finds this helpful.  She requests to wear a beanie on her head because it gets cold and also requests to have her \"gel pens\" that are currently in her possessions.  Inform her I would have to check policy for those things and get back to her.  Patient denies si/hi and tells me she is going to go back to her room and sleep.      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          Medications:       amantadine  100 mg Oral BID     celecoxib  200 mg Oral BID     cephalexin  250 mg Oral Q8H MICHAEL     cholecalciferol  2,000 Units Oral Daily     cloNIDine  0.1 mg Oral TID     cyanocobalamin  1,000 mcg Oral Daily     fish oil-omega-3 fatty acids  1 g Oral BID     gabapentin  600 mg Oral 4x Daily     [START ON 9/4/2018] lamoTRIgine  100 mg Oral BID     lamoTRIgine (LaMICtal) tablet 50 mg  50 mg Oral At Bedtime     lamoTRIgine  75 mg Oral Daily     loratadine  10 " "mg Oral Daily     nicotine  1 patch Transdermal Daily     nicotine   Transdermal Q8H     nicotine   Transdermal Daily     PNV PRENATAL PLUS MULTIVITAMIN  1 tablet Oral Daily     pyridOXINE  25 mg Oral Daily     ranitidine  150 mg Oral BID     risperiDONE microspheres  50 mg Intramuscular Q14 Days     saccharomyces boulardii  250 mg Oral BID     acetaminophen, alum & mag hydroxide-simethicone, amantadine, diphenhydrAMINE, gabapentin, hydrOXYzine (VISTARIL) capsule  mg, LORazepam, magnesium hydroxide, mineral oil-hydrophilic petrolatum, nicotine polacrilex, risperiDONE, selenium sulfide          Allergies:     Allergies   Allergen Reactions     Azithromycin Anaphylaxis     Abilify [Aripiprazole] Unknown     Wellbutrin [Bupropion] Other (See Comments)     suicidal     Zithromax [Azithromycin Dihydrate]           Psychiatric Examination:   /80  Pulse 96  Temp 98.1  F (36.7  C) (Tympanic)  Resp 18  Ht 1.651 m (5' 5\")  Wt 99.9 kg (220 lb 3.2 oz)  SpO2 98%  BMI 36.64 kg/m2  Weight is 220 lbs 3.2 oz  Body mass index is 36.64 kg/(m^2).    Appearance:  awake, alert, adequately groomed and dressed in hospital scrubs  Attitude:  cooperative  Eye Contact:  good  Mood:  bighter  Affect:  mood congruent, intensity is heightened and intensity is dramatic  Speech:  clear, coherent   Psychomotor Behavior:  no evidence of tardive dyskinesia, dystonia, or tics and intact station, gait and muscle tone  Thought Process:  Less disorganized  Associations:  Morris are less  Thought Content:  no evidence of suicidal ideation or homicidal ideation  Insight:  improving  Judgment:  fair  Oriented to:  time, person, and place  Attention Span and Concentration:  fair  Recent and Remote Memory:  fair  Fund of Knowledge: appropriate  Muscle Strength and Tone: normal  Gait and Station: Normal  Perception: no perceptual disturbance noted         Labs:     No results found for this or any previous visit (from the past 24 hour(s)).      " Assessment/ Plan:         Recent increase in Lamictal    Patient requests aleah for her head and her gel pens    ELOS:  >5 days due to completion of cancer infusions on 8/30/18 - next treatment Tuesday 9/4  Discharge to safe, supportive environment.   Recommend close to [facility] so can continue Cancer treatments along with support groups for low anxiety.

## 2018-09-02 NOTE — PLAN OF CARE
Problem: Patient Care Overview  Goal: Individualization & Mutuality  Patient will be compliant with treatment team recommendations.  Patient will report at least 6-8 hours of sleep each night.    8/21/18 Patient may keep wet wipes in her bathroom per patient care order for proper hygiene after chemo treatments.  Patient will attend groups.   Patient is unable to have a second scrub jacket as patient has been using jackets inappropriately.    Pt appeared sleeping at the beginning of this shift, normal respirations and position changes noted. Pt awake at 0415 and given a ginger ale and crackers for nausea, pt in and out of lobby the rest of this shift. Pt put a scrub jacket around her head like a hat/turbin, writer reminded pt that was not appropriate and she was care planned to not use the jackets inappropriately. Pt talks about having a hat in her belongings that she would like to be able to wear due to the hair loss and being cold from having cancer and doing chemo treatments. Pt given Ativan 1 mg at 0630 per request for anxiety.

## 2018-09-02 NOTE — PLAN OF CARE
Problem: Patient Care Overview  Goal: Individualization & Mutuality  Patient will be compliant with treatment team recommendations.  Patient will report at least 6-8 hours of sleep each night.    8/21/18 Patient may keep wet wipes in her bathroom per patient care order for proper hygiene after chemo treatments.  Patient will attend groups.   Patient is unable to have a second scrub jacket as patient has been using jackets inappropriately.    Outcome: Improving  Pt was up early today and struggled with feelings of being bored and irritable. She received PRN Gabapentin and Visteril 100 mg at 0925. She used the Alpha Stim and talked with me about her struggle to stay centered in the present. She told me that she is really feeling bored and irritated by other people today. She feels distracted by the TV and other Pt's talking. She did spend time listening to music and writing in her room. She is hopeful that she will be receiving he last chemo treatment Tuesday after her port is assessed by the surgeon.  She has not attended groups today because of feeling over stimulated.    Problem: Coping, Compromised Individual (Adult,Obstetrics,Pediatric)  Goal: Effective Coping  Patient will apply her coping skills, actively deal with grief issues, and talk with nursing staff about her issues in an appropriate manner.   Outcome: Improving  Pt has used appropriate coping skills. She wrote in her journal, used the Alpha Stim, asked for appropriate PRN's, and talked with staff.

## 2018-09-02 NOTE — PROGRESS NOTES
"[facility]  Psychiatric Progress Note    Subjective     She states she is feeling much better today than she has the past few days and states \"i should have known I was going to get my period.\" she states her mood lability increases drastically prior to menses. It improves within a day after she gets it most times. She has been very irregular since she started chemo. She has more insight today than she did the last few days. She is less pressured and is able to have delusions challenged which brings her back to having more insight. No SI. Does have AH at tiimes and still talks about bizarre somewhat grandiose knutson that she believes she has. Is now not trying to avoid taking her EARL and states se will agree to take it.     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          Medications:       amantadine  100 mg Oral BID     celecoxib  200 mg Oral BID     cephalexin  250 mg Oral Q8H MICHAEL     cholecalciferol  2,000 Units Oral Daily     cloNIDine  0.1 mg Oral TID     cyanocobalamin  1,000 mcg Oral Daily     fish oil-omega-3 fatty acids  1 g Oral BID     gabapentin  600 mg Oral 4x Daily     [START ON 9/4/2018] lamoTRIgine  100 mg Oral BID     lamoTRIgine (LaMICtal) tablet 50 mg  50 mg Oral BID     loratadine  10 mg Oral Daily     nicotine  1 patch Transdermal Daily     nicotine   Transdermal Q8H     nicotine   Transdermal Daily     PNV PRENATAL PLUS MULTIVITAMIN  1 tablet Oral Daily     pyridOXINE  25 mg Oral Daily     ranitidine  150 mg " "Oral BID     risperiDONE microspheres  50 mg Intramuscular Q14 Days     saccharomyces boulardii  250 mg Oral BID     acetaminophen, alum & mag hydroxide-simethicone, amantadine, diphenhydrAMINE, gabapentin, hydrOXYzine (VISTARIL) capsule  mg, LORazepam, magnesium hydroxide, mineral oil-hydrophilic petrolatum, nicotine polacrilex, risperiDONE, selenium sulfide          Allergies:     Allergies   Allergen Reactions     Azithromycin Anaphylaxis     Abilify [Aripiprazole] Unknown     Wellbutrin [Bupropion] Other (See Comments)     suicidal     Zithromax [Azithromycin Dihydrate]           Psychiatric Examination:   /76  Pulse 96  Temp 98  F (36.7  C) (Tympanic)  Resp 12  Ht 1.651 m (5' 5\")  Wt 99.9 kg (220 lb 3.2 oz)  SpO2 96%  BMI 36.64 kg/m2  Weight is 220 lbs 3.2 oz  Body mass index is 36.64 kg/(m^2).    Appearance:  awake, alert, adequately groomed and dressed in hospital scrubs  Attitude:  cooperative  Eye Contact:  good  Mood:  bighter  Affect:  mood congruent, intensity is heightened and intensity is dramatic  Speech:  clear, coherent   Psychomotor Behavior:  no evidence of tardive dyskinesia, dystonia, or tics and intact station, gait and muscle tone  Thought Process:  Less disorganized  Associations:  [place] are less  Thought Content:  no evidence of suicidal ideation or homicidal ideation  Insight:  improving  Judgment:  fair  Oriented to:  time, person, and place  Attention Span and Concentration:  fair  Recent and Remote Memory:  fair  Fund of Knowledge: appropriate  Muscle Strength and Tone: normal  Gait and Station: Normal  Perception: no perceptual disturbance noted         Labs:     No results found for this or any previous visit (from the past 24 hour(s)).      Assessment/ Plan:         Increase lamictal likely in the next couple of days. She was on 200 mg prior to admission and had not been off of it long.         ELOS:  >5 days due to completion of cancer infusions on 8/30/18  Discharge " to safe, supportive environment.   Recommend close to [facility] so can continue Cancer treatments along with support groups for low anxiety.

## 2018-09-02 NOTE — PHARMACY
[facility]    Pharmacy    Antimicrobial Stewardship Note     Current antimicrobial therapy:  Anti-infectives (Future)    Start     Dose/Rate Route Frequency Ordered Stop    08/29/18 1400  cephalexin (KEFLEX) capsule 250 mg      250 mg Oral EVERY 8 HOURS SCHEDULED 08/29/18 1006 09/03/18 0819        Indication: SSTI    Days of Therapy: 10 (stop date of 9/3/18)     Pertinent labs:  Creatinine   Creatinine   Date Value Ref Range Status   08/24/2018 0.46 (L) 0.52 - 1.04 mg/dL Final   08/20/2018 0.35 (L) 0.52 - 1.04 mg/dL Final   08/13/2018 0.44 (L) 0.52 - 1.04 mg/dL Final     WBC   WBC   Date Value Ref Range Status   08/30/2018 7.9 4.0 - 11.0 10e9/L Final   08/27/2018 9.4 4.0 - 11.0 10e9/L Final   08/24/2018 9.5 4.0 - 11.0 10e9/L Final     Procalcitonin   Procalcitonin   Date Value Ref Range Status   08/24/2018 <0.05 ng/ml Final     Comment:     <0.05 ng/ml  Normal  Recommendation: Very low risk of bacterial infection.   Discourage antibiotics unless strong clinical suspicion for serious infection.       CRP   CRP Inflammation   Date Value Ref Range Status   01/29/2016 8.2 (H) 0.0 - 8.0 mg/L Final     Culture Results:   30-Day Micro Results      Procedure Component Value Units Date/Time     Wound Culture Aerobic Bacterial      Order Status: No result Lab Status: No result      Specimen: Wound      Gram stain      Order Status: No result Lab Status: No result      Fluid Culture Aerobic Bacterial [[id number]] (Abnormal)  Collected: 08/27/18 1130     Order Status: Completed Lab Status: Final result Updated: 08/29/18 1258     Specimen: Breast aspirate from Breast, Right       Specimen Description Breast aspirate Right Breast      Culture Micro Heavy growth   Streptococcus agalactiae sero group B    (A)     Culture & Susceptibility      STREPTOCOCCUS AGALACTIAE SERO GROUP B      Antibiotic Interpretation Sensitivity Unit Method Status     AMPICILLIN Sensitive <=0.25 ug/mL BERNICE Final     CLINDAMYCIN Resistant 4 ug/mL  BERNICE Final     LEVOFLOXACIN Sensitive 1 ug/mL BERNICE Final     PENICILLIN Sensitive <=0.12 ug/mL BERNICE Final     VANCOMYCIN Sensitive <=0.5 ug/mL BERNICE Final                           Gram stain [[id number]] (Abnormal) Collected: 08/27/18 1130     Order Status: Completed Lab Status: Final result Updated: 08/28/18 0740     Specimen: Breast aspirate       Specimen Description Breast aspirate Right Breast      Gram Stain Many   WBC'S seen          Few   Gram positive cocci    (A)     Blood culture [[id number]] Collected: 08/24/18 1150     Order Status: Completed Lab Status: Final result Updated: 08/30/18 0826     Specimen: Blood from Portacath       Specimen Description Blood      Culture Micro No growth after 6 days     Recommendations/Interventions:  1. None at this time.     Ana Santos RPH  September 2, 2018

## 2018-09-02 NOTE — PLAN OF CARE
Face to face end of shift report received from Angie MEDRANO. Rounding completed. Patient observed in dayroom and introduced to nursing for the shift    Chuy Quijano  9/2/2018  7:51 AM

## 2018-09-02 NOTE — PLAN OF CARE
Face to face end of shift report received from Chuy MOYA RN. Rounding completed. Patient observed in [facility].     Mary Velasquez  9/1/2018  3:36 PM

## 2018-09-02 NOTE — PLAN OF CARE
"Problem: Patient Care Overview  Goal: Individualization & Mutuality  Patient will be compliant with treatment team recommendations.  Patient will report at least 6-8 hours of sleep each night.    8/21/18 Patient may keep wet wipes in her bathroom per patient care order for proper hygiene after chemo treatments.  Patient will attend groups.   Patient is unable to have a second scrub jacket as patient has been using jackets inappropriately.    Outcome: Improving  Pt is up in the lounge when nurse arrives to this shift. She states she is feeling well today, pt does talk with peer who is upset and does give good feed back to peer such as \"try not to let others actions over take you, you choose how you react\". However, after talking with peer, pt approaches nurses station and asks for an Ativan. Pt states she became \"amped up\" from her peers yelling. Nurse explains to pt that she can not have another dose unit 5pm due to the orders I then ask patient to use her coping skills such as meditation which pt reports works well for her. Pt does go to her room and states she is trying and feels like she is going to hurt herself and needs someone to talk with. Nurse does sit with patient and I do listen to her vent. Pt rambles and jumps from topic to topic, she talks about her worry of dying s/t cancer. She then talks about wanting to die and wishes this life would be over. Pt talks about different occasion good and bad in her life but not completing one topic before jumping to the next. Nurse attempts to complete guided meditation with patient. She then states \"all of those coping skills are not working for me anymore, they are crap\" pt talks about not wanting to rely on \"drugs\" to get by but then states she needs her PRN Ativan. Dinner cart comes and pt then states \"maybe that is what is wrong with me, I am so hungry.\" Pt then walks to get her meal. After dinner patient comes to the nurses station stating she is anxious and " needs her Ativan.   1739 ativan 1 mg prn given for c/o anxiety     Problem: Coping, Compromised Individual (Adult,Obstetrics,Pediatric)  Goal: Effective Coping  Patient will apply her coping skills, actively deal with grief issues, and talk with nursing staff about her issues in an appropriate manner.   Outcome: Improving  Pt states he coping skills are not working today. Pt talks with nurse about grief and difficult time coping. She talks about her loss of her father, the adoption of her son, and the loss of her breasts. She also talks about learning to love herself and not needing anyone else anymore.     Pt does contract for safety today.

## 2018-09-03 PROCEDURE — A9270 NON-COVERED ITEM OR SERVICE: HCPCS | Mod: GY | Performed by: SURGERY

## 2018-09-03 PROCEDURE — A9270 NON-COVERED ITEM OR SERVICE: HCPCS | Mod: GY | Performed by: NURSE PRACTITIONER

## 2018-09-03 PROCEDURE — 25000132 ZZH RX MED GY IP 250 OP 250 PS 637: Mod: GY | Performed by: NURSE PRACTITIONER

## 2018-09-03 PROCEDURE — 25000132 ZZH RX MED GY IP 250 OP 250 PS 637: Mod: GY | Performed by: SURGERY

## 2018-09-03 PROCEDURE — 99232 SBSQ HOSP IP/OBS MODERATE 35: CPT | Performed by: NURSE PRACTITIONER

## 2018-09-03 PROCEDURE — 12400011

## 2018-09-03 RX ORDER — LORAZEPAM 0.5 MG/1
0.5 TABLET ORAL 2 TIMES DAILY PRN
Status: DISCONTINUED | OUTPATIENT
Start: 2018-09-03 | End: 2018-09-03

## 2018-09-03 RX ORDER — LORAZEPAM 0.5 MG/1
1-1.5 TABLET ORAL 2 TIMES DAILY PRN
Status: DISCONTINUED | OUTPATIENT
Start: 2018-09-03 | End: 2018-09-05

## 2018-09-03 RX ADMIN — Medication 250 MG: at 08:26

## 2018-09-03 RX ADMIN — Medication 1 G: at 20:59

## 2018-09-03 RX ADMIN — GABAPENTIN 600 MG: 300 CAPSULE ORAL at 08:27

## 2018-09-03 RX ADMIN — VITAMIN A, VITAMIN C, VITAMIN D-3, VITAMIN E, VITAMIN B-1, VITAMIN B-2, NIACIN, VITAMIN B-6, CALCIUM, IRON, ZINC, COPPER 1 TABLET: 4000; 120; 400; 22; 1.84; 3; 20; 10; 1; 12; 200; 27; 25; 2 TABLET ORAL at 08:27

## 2018-09-03 RX ADMIN — Medication 250 MG: at 20:59

## 2018-09-03 RX ADMIN — RISPERIDONE 1 MG: 1 TABLET, ORALLY DISINTEGRATING ORAL at 11:34

## 2018-09-03 RX ADMIN — CEPHALEXIN 250 MG: 250 CAPSULE ORAL at 06:30

## 2018-09-03 RX ADMIN — CYANOCOBALAMIN TAB 1000 MCG 1000 MCG: 1000 TAB at 08:28

## 2018-09-03 RX ADMIN — Medication 1 G: at 08:28

## 2018-09-03 RX ADMIN — RANITIDINE 150 MG: 150 TABLET ORAL at 08:27

## 2018-09-03 RX ADMIN — VITAMIN D, TAB 1000IU (100/BT) 2000 UNITS: 25 TAB at 08:26

## 2018-09-03 RX ADMIN — NICOTINE POLACRILEX 4 MG: 2 GUM, CHEWING ORAL at 01:35

## 2018-09-03 RX ADMIN — NICOTINE POLACRILEX 4 MG: 2 GUM, CHEWING ORAL at 20:22

## 2018-09-03 RX ADMIN — GABAPENTIN 600 MG: 300 CAPSULE ORAL at 20:58

## 2018-09-03 RX ADMIN — GABAPENTIN 600 MG: 300 CAPSULE ORAL at 17:21

## 2018-09-03 RX ADMIN — LORAZEPAM 1.5 MG: 0.5 TABLET ORAL at 21:01

## 2018-09-03 RX ADMIN — NICOTINE 1 PATCH: 21 PATCH, EXTENDED RELEASE TRANSDERMAL at 08:29

## 2018-09-03 RX ADMIN — NICOTINE POLACRILEX 4 MG: 2 GUM, CHEWING ORAL at 11:35

## 2018-09-03 RX ADMIN — CELECOXIB 200 MG: 100 CAPSULE ORAL at 08:27

## 2018-09-03 RX ADMIN — LAMOTRIGINE 75 MG: 25 TABLET ORAL at 08:26

## 2018-09-03 RX ADMIN — CLONIDINE HYDROCHLORIDE 0.1 MG: 0.1 TABLET ORAL at 20:58

## 2018-09-03 RX ADMIN — GABAPENTIN 600 MG: 300 CAPSULE ORAL at 12:41

## 2018-09-03 RX ADMIN — AMANTADINE 100 MG: 100 CAPSULE ORAL at 08:27

## 2018-09-03 RX ADMIN — RANITIDINE 150 MG: 150 TABLET ORAL at 20:58

## 2018-09-03 RX ADMIN — NICOTINE POLACRILEX 4 MG: 2 GUM, CHEWING ORAL at 21:35

## 2018-09-03 RX ADMIN — AMANTADINE 100 MG: 100 CAPSULE ORAL at 20:59

## 2018-09-03 RX ADMIN — LAMOTRIGINE 50 MG: 25 TABLET ORAL at 20:59

## 2018-09-03 RX ADMIN — NICOTINE POLACRILEX 4 MG: 2 GUM, CHEWING ORAL at 08:29

## 2018-09-03 RX ADMIN — CELECOXIB 200 MG: 100 CAPSULE ORAL at 20:58

## 2018-09-03 RX ADMIN — NICOTINE POLACRILEX 4 MG: 2 GUM, CHEWING ORAL at 14:01

## 2018-09-03 RX ADMIN — CLONIDINE HYDROCHLORIDE 0.1 MG: 0.1 TABLET ORAL at 14:01

## 2018-09-03 RX ADMIN — LORAZEPAM 1 MG: 1 TABLET ORAL at 08:32

## 2018-09-03 RX ADMIN — CLONIDINE HYDROCHLORIDE 0.1 MG: 0.1 TABLET ORAL at 08:28

## 2018-09-03 RX ADMIN — LORATADINE 10 MG: 10 TABLET ORAL at 08:27

## 2018-09-03 RX ADMIN — Medication 25 MG: at 08:27

## 2018-09-03 RX ADMIN — NICOTINE POLACRILEX 4 MG: 2 GUM, CHEWING ORAL at 17:18

## 2018-09-03 ASSESSMENT — ACTIVITIES OF DAILY LIVING (ADL)
GROOMING: INDEPENDENT
DRESS: SCRUBS (BEHAVIORAL HEALTH);INDEPENDENT
ORAL_HYGIENE: INDEPENDENT
GROOMING: INDEPENDENT
DRESS: INDEPENDENT;SCRUBS (BEHAVIORAL HEALTH)
LAUNDRY: UNABLE TO COMPLETE

## 2018-09-03 NOTE — PLAN OF CARE
Face to face end of shift report received from MITCHELL Adams. Rounding completed. Patient observed.     Sally Emanuel  9/3/2018  9:01 AM

## 2018-09-03 NOTE — PLAN OF CARE
"Problem: Patient Care Overview  Goal: Individualization & Mutuality  Patient will be compliant with treatment team recommendations.  Patient will report at least 6-8 hours of sleep each night.    8/21/18 Patient may keep wet wipes in her bathroom per patient care order for proper hygiene after chemo treatments.  Patient will attend groups.   Patient is unable to have a second scrub jacket as patient has been using jackets inappropriately.    Pt up on unit early today, not attending groups, but writing in journal and visiting with staff and peers. Pt listening to music, requested PRN Ativan with morning medications and has used alpha stim unit for anxiety. Rambling in conversation, reported no SI, depression or hallucinations. Irritable with peer in [facility] earlier, but pt stated she came to terms with others also being ill and is trying to be more compassionate and understanding. Stated she is working to focus on just one thing at a time today. Taking medication as ordered, slept well and is eating 100% of meals. No physical complaint, stated she feels right chest inflammation is much better, no c/o pain. Pt does hope to have F/U lab to check that infection is resolved.  1135 - Pt in [facility] visiting with peer, requested Cholo gum. Given nicotine gum, and made request for PRN Risperdal and to again use alpha-stim unit in [facility], adding that she \"didn't need anyone to watch her, since she is on camera anyway. Pt quite irritable in making these requests. Writer did administer Risperdal, gave alpha-stim to pt to use in [facility] and sat across the room from pt while she was in observation for safety. Pt sarcastic, stated she \"wants privacy.\" Several peers in [facility] at the time, but pt stated having staff present was the problem, and wanted staff to leave. Pt became more agitated, took off and handed the alpha-stim unit back to writer saying she does not want to be watched while using it. Pt retreated to her room, did not eat " "lunch with peers.   1245 - Pr refused to eat any lunch, remained in room and became increasingly agitated and angry. Pt requested further PRN medication \"or the alpha-stim unit in my room.\" Scheduled Gabapentin provided, writer spent 1:1 time listening to pt's frustration with peers on the unit. Pt stated her \"insecurities are being targeted,\" and other pts are \"spreading poison.\" Pt referred to this as toxic shame, as she said her weight is being criticized for the second time today. Staff is unaware of other pts making any comments, continued monitoring.    Problem: Coping, Compromised Individual (Adult,Obstetrics,Pediatric)  Goal: Effective Coping  Patient will apply her coping skills, actively deal with grief issues, and talk with nursing staff about her issues in an appropriate manner.   Pt is using her journal, read part of today's entry to writer and described how she is trying to have more compassion for peers on the unit that also struggle with mental illness and TBI.      "

## 2018-09-03 NOTE — PROGRESS NOTES
"[facility]  Psychiatric Progress Note    Subjective     She is quite irritable today. She agirly asks me to double her lamictal dose. I did tell her that I would not be doubling it and that it was increased yesterday. I ask her why she wants it doubled and she states \"becuase im not doing well yet\". She then angirly states \"can you at least icrease my ativan today because I feel like freaking out\". She was able to calm herself down. She is very fixated again that lamictal \"is the only medication that works well ad that all I need\".       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          Medications:       amantadine  100 mg Oral BID     celecoxib  200 mg Oral BID     cholecalciferol  2,000 Units Oral Daily     cloNIDine  0.1 mg Oral TID     cyanocobalamin  1,000 mcg Oral Daily     fish oil-omega-3 fatty acids  1 g Oral BID     gabapentin  600 mg Oral 4x Daily     [START ON 9/4/2018] lamoTRIgine  100 mg Oral BID     lamoTRIgine (LaMICtal) tablet 50 mg  50 mg Oral At Bedtime     lamoTRIgine  75 mg Oral Daily     loratadine  10 mg Oral Daily     nicotine  1 patch Transdermal Daily     nicotine   Transdermal Q8H     nicotine   Transdermal Daily     PNV PRENATAL PLUS MULTIVITAMIN  1 tablet Oral Daily     pyridOXINE  25 mg Oral Daily     ranitidine  150 mg Oral BID     risperiDONE microspheres  50 mg Intramuscular Q14 Days     saccharomyces boulardii  250 mg Oral BID     acetaminophen, alum & mag hydroxide-simethicone, amantadine, " "diphenhydrAMINE, gabapentin, hydrOXYzine (VISTARIL) capsule  mg, LORazepam, LORazepam, magnesium hydroxide, mineral oil-hydrophilic petrolatum, nicotine polacrilex, risperiDONE, selenium sulfide          Allergies:     Allergies   Allergen Reactions     Azithromycin Anaphylaxis     Abilify [Aripiprazole] Unknown     Wellbutrin [Bupropion] Other (See Comments)     suicidal     Zithromax [Azithromycin Dihydrate]           Psychiatric Examination:   BP (!) 139/102  Pulse 99  Temp 96.8  F (36  C) (Tympanic)  Resp 18  Ht 1.651 m (5' 5\")  Wt 99.9 kg (220 lb 3.2 oz)  SpO2 99%  BMI 36.64 kg/m2  Weight is 220 lbs 3.2 oz  Body mass index is 36.64 kg/(m^2).    Appearance:  awake, alert, adequately groomed and dressed in hospital scrubs  Attitude:  cooperative  Eye Contact:  good  Mood:  irritable  Affect:  mood congruent, intensity is heightened and intensity is dramatic  Speech:  clear, coherent   Psychomotor Behavior:  no evidence of tardive dyskinesia, dystonia, or tics and intact station, gait and muscle tone  Thought Process:  Less disorganized  Associations:  [place] are less  Thought Content:  no evidence of suicidal ideation or homicidal ideation  Insight:  improving  Judgment:  fair  Oriented to:  time, person, and place  Attention Span and Concentration:  fair  Recent and Remote Memory:  fair  Fund of Knowledge: appropriate  Muscle Strength and Tone: normal  Gait and Station: Normal  Perception: no perceptual disturbance noted         Labs:     No results found for this or any previous visit (from the past 24 hour(s)).      Assessment/ Plan:       Increase ativan prn by 0.5    Will not increase lamictal today. Was just increased.     ELOS:  >5 days due to completion of cancer infusions on 8/30/18 - next treatment Tuesday 9/4  Discharge to safe, supportive environment.   Recommend close to [facility] so can continue Cancer treatments along with support groups for low anxiety.  "

## 2018-09-03 NOTE — PLAN OF CARE
Face to face end of shift report received from Sally MEDRANO. Rounding completed. Patient observed.     Laura Nettles  9/3/2018   3:45 PM

## 2018-09-03 NOTE — PLAN OF CARE
Face to face end of shift report received from Geo MEDRANO. Rounding completed. Patient observed.     Angie Savage  9/2/2018  11:46 PM

## 2018-09-03 NOTE — PLAN OF CARE
"Problem: Patient Care Overview  Goal: Individualization & Mutuality  Patient will be compliant with treatment team recommendations.  Patient will report at least 6-8 hours of sleep each night.    8/21/18 Patient may keep wet wipes in her bathroom per patient care order for proper hygiene after chemo treatments.  Patient will attend groups.   Patient is unable to have a second scrub jacket as patient has been using jackets inappropriately.    Outcome: Improving  Pt is up attending groups this shift. Pt states \"I feel so much better today\" she denies anxiety, depression, SI, HI, and hallucinations upon initial assessment. Pt is free from self harm this shift. She does spend time pacing the hallway listening to music. Pt does sit with peers in the lounge but is withdrawn from others.     Problem: Coping, Compromised Individual (Adult,Obstetrics,Pediatric)  Goal: Effective Coping  Patient will apply her coping skills, actively deal with grief issues, and talk with nursing staff about her issues in an appropriate manner.   Outcome: Improving  Pt states she continues to grieve but feels she is doing better today. During visiting hours pt does approach nurse and requests to have a prn, pt states the amount of people here today and the noise has increased her anxiety. Pt states she has been trying to use her coping skills and it is not working. Pt reports feeling paranoid. Nurse does administer Ativan, Risperdal, and Benadryl per patient request. Nurse administers Ativan and Benadryl first and began to open the dissolvable Risperdal. Pt states \"you didn't give me my ativan, why didn't you give me my ativan. Nurse explains that I did give her, her ativan and I still have the Risperdal due to it being dissolvable I am giving it separately as I had explained prior to handing pt the cup with her ativan and benadryl. I then hand patient her cup with Risperdal. She states \"oh, this is my Risperdal.\" Nurse clarifies again what " this writer had done. Pt states an understanding. Pt is compliant with medications.

## 2018-09-03 NOTE — PLAN OF CARE
Problem: Patient Care Overview  Goal: Individualization & Mutuality  Patient will be compliant with treatment team recommendations.  Patient will report at least 6-8 hours of sleep each night.    8/21/18 Patient may keep wet wipes in her bathroom per patient care order for proper hygiene after chemo treatments.  Patient will attend groups.   Patient is unable to have a second scrub jacket as patient has been using jackets inappropriately.    Pt appeared to be sleeping most of this shift, normal respirations and position changes noted. Pt did get up once during the night and was given a snack and returned to bed.

## 2018-09-04 ENCOUNTER — OFFICE VISIT (OUTPATIENT)
Dept: SURGERY | Facility: OTHER | Age: 28
DRG: 885 | End: 2018-09-04
Attending: SURGERY
Payer: MEDICARE

## 2018-09-04 VITALS
SYSTOLIC BLOOD PRESSURE: 110 MMHG | HEIGHT: 65 IN | TEMPERATURE: 98.7 F | DIASTOLIC BLOOD PRESSURE: 66 MMHG | HEART RATE: 101 BPM | OXYGEN SATURATION: 93 % | BODY MASS INDEX: 36.65 KG/M2 | RESPIRATION RATE: 18 BRPM | WEIGHT: 220 LBS

## 2018-09-04 DIAGNOSIS — N64.89 SEROMA OF BREAST: Primary | ICD-10-CM

## 2018-09-04 PROCEDURE — 99024 POSTOP FOLLOW-UP VISIT: CPT | Performed by: SURGERY

## 2018-09-04 PROCEDURE — A9270 NON-COVERED ITEM OR SERVICE: HCPCS | Mod: GY | Performed by: NURSE PRACTITIONER

## 2018-09-04 PROCEDURE — 25000128 H RX IP 250 OP 636: Performed by: NURSE PRACTITIONER

## 2018-09-04 PROCEDURE — 25000132 ZZH RX MED GY IP 250 OP 250 PS 637: Mod: GY | Performed by: NURSE PRACTITIONER

## 2018-09-04 PROCEDURE — G0463 HOSPITAL OUTPT CLINIC VISIT: HCPCS | Mod: 25

## 2018-09-04 PROCEDURE — G0463 HOSPITAL OUTPT CLINIC VISIT: HCPCS

## 2018-09-04 PROCEDURE — 12400011

## 2018-09-04 RX ADMIN — LAMOTRIGINE 50 MG: 25 TABLET ORAL at 20:15

## 2018-09-04 RX ADMIN — GABAPENTIN 600 MG: 300 CAPSULE ORAL at 20:15

## 2018-09-04 RX ADMIN — HYDROXYZINE PAMOATE 100 MG: 50 CAPSULE ORAL at 11:08

## 2018-09-04 RX ADMIN — RANITIDINE 150 MG: 150 TABLET ORAL at 08:41

## 2018-09-04 RX ADMIN — AMANTADINE 100 MG: 100 CAPSULE ORAL at 08:49

## 2018-09-04 RX ADMIN — LORAZEPAM 1.5 MG: 0.5 TABLET ORAL at 09:13

## 2018-09-04 RX ADMIN — SELENIUM SULFIDE: 10 SHAMPOO TOPICAL at 08:16

## 2018-09-04 RX ADMIN — NICOTINE 1 PATCH: 21 PATCH, EXTENDED RELEASE TRANSDERMAL at 08:52

## 2018-09-04 RX ADMIN — NICOTINE POLACRILEX 4 MG: 2 GUM, CHEWING ORAL at 05:51

## 2018-09-04 RX ADMIN — VITAMIN A, VITAMIN C, VITAMIN D-3, VITAMIN E, VITAMIN B-1, VITAMIN B-2, NIACIN, VITAMIN B-6, CALCIUM, IRON, ZINC, COPPER 1 TABLET: 4000; 120; 400; 22; 1.84; 3; 20; 10; 1; 12; 200; 27; 25; 2 TABLET ORAL at 08:46

## 2018-09-04 RX ADMIN — Medication 1 G: at 08:48

## 2018-09-04 RX ADMIN — CYANOCOBALAMIN TAB 1000 MCG 1000 MCG: 1000 TAB at 08:52

## 2018-09-04 RX ADMIN — Medication 250 MG: at 20:15

## 2018-09-04 RX ADMIN — RANITIDINE 150 MG: 150 TABLET ORAL at 20:14

## 2018-09-04 RX ADMIN — GABAPENTIN 300 MG: 300 CAPSULE ORAL at 11:08

## 2018-09-04 RX ADMIN — LORATADINE 10 MG: 10 TABLET ORAL at 08:46

## 2018-09-04 RX ADMIN — NICOTINE POLACRILEX 4 MG: 2 GUM, CHEWING ORAL at 22:13

## 2018-09-04 RX ADMIN — LAMOTRIGINE 100 MG: 100 TABLET ORAL at 20:17

## 2018-09-04 RX ADMIN — NICOTINE POLACRILEX 4 MG: 2 GUM, CHEWING ORAL at 17:11

## 2018-09-04 RX ADMIN — WHITE PETROLATUM: 1.75 OINTMENT TOPICAL at 08:16

## 2018-09-04 RX ADMIN — LAMOTRIGINE 75 MG: 25 TABLET ORAL at 08:43

## 2018-09-04 RX ADMIN — Medication 250 MG: at 08:41

## 2018-09-04 RX ADMIN — LORAZEPAM 1.5 MG: 0.5 TABLET ORAL at 17:10

## 2018-09-04 RX ADMIN — VITAMIN D, TAB 1000IU (100/BT) 2000 UNITS: 25 TAB at 08:47

## 2018-09-04 RX ADMIN — NICOTINE POLACRILEX 4 MG: 2 GUM, CHEWING ORAL at 11:07

## 2018-09-04 RX ADMIN — GABAPENTIN 600 MG: 300 CAPSULE ORAL at 08:45

## 2018-09-04 RX ADMIN — CELECOXIB 200 MG: 100 CAPSULE ORAL at 08:40

## 2018-09-04 RX ADMIN — Medication 1 G: at 20:15

## 2018-09-04 RX ADMIN — CELECOXIB 200 MG: 100 CAPSULE ORAL at 20:14

## 2018-09-04 RX ADMIN — Medication 25 MG: at 08:42

## 2018-09-04 RX ADMIN — CLONIDINE HYDROCHLORIDE 0.1 MG: 0.1 TABLET ORAL at 08:49

## 2018-09-04 RX ADMIN — NICOTINE POLACRILEX 4 MG: 2 GUM, CHEWING ORAL at 09:11

## 2018-09-04 RX ADMIN — RISPERIDONE 1 MG: 1 TABLET, ORALLY DISINTEGRATING ORAL at 09:15

## 2018-09-04 RX ADMIN — DIPHENHYDRAMINE HYDROCHLORIDE 50 MG: 50 CAPSULE ORAL at 09:12

## 2018-09-04 RX ADMIN — NICOTINE POLACRILEX 4 MG: 2 GUM, CHEWING ORAL at 20:14

## 2018-09-04 RX ADMIN — NICOTINE POLACRILEX 4 MG: 2 GUM, CHEWING ORAL at 07:22

## 2018-09-04 RX ADMIN — AMANTADINE 100 MG: 100 CAPSULE ORAL at 20:14

## 2018-09-04 RX ADMIN — RISPERIDONE 50 MG: KIT at 09:19

## 2018-09-04 RX ADMIN — CLONIDINE HYDROCHLORIDE 0.1 MG: 0.1 TABLET ORAL at 20:15

## 2018-09-04 RX ADMIN — LAMOTRIGINE 100 MG: 100 TABLET ORAL at 08:54

## 2018-09-04 RX ADMIN — NICOTINE POLACRILEX 4 MG: 2 GUM, CHEWING ORAL at 18:46

## 2018-09-04 RX ADMIN — GABAPENTIN 600 MG: 300 CAPSULE ORAL at 17:10

## 2018-09-04 ASSESSMENT — ACTIVITIES OF DAILY LIVING (ADL)
ORAL_HYGIENE: INDEPENDENT
GROOMING: INDEPENDENT
DRESS: INDEPENDENT
DRESS: INDEPENDENT;SCRUBS (BEHAVIORAL HEALTH)
LAUNDRY: UNABLE TO COMPLETE
GROOMING: INDEPENDENT
ORAL_HYGIENE: INDEPENDENT

## 2018-09-04 ASSESSMENT — PAIN SCALES - GENERAL: PAINLEVEL: NO PAIN (0)

## 2018-09-04 NOTE — PLAN OF CARE
Problem: Patient Care Overview  Goal: Individualization & Mutuality  Patient will be compliant with treatment team recommendations.  Patient will report at least 6-8 hours of sleep each night.    8/21/18 Patient may keep wet wipes in her bathroom per patient care order for proper hygiene after chemo treatments.  Patient will attend groups.   Patient is unable to have a second scrub jacket as patient has been using jackets inappropriately.    Patient denies SI, HI, pain. Patient reports anxiety and depression, does not rate with number scale, reports that they are controlled, auditory hallucinations reported. Patient is cooperative and pleasant this shift, she is working on having some good insight to her illnesses. Patient reports she is trying very hard to control her feelings, and not have outbursts or negative judgement against anyone. Patient is out in the lounge the majority of the shift, out to dinner, attends groups, uses alpha stim, and interacts appropriately with other patients as well as staff. Patient states she is using her journal to write her feelings often, and this does give her some assistance. Patient is compliant with nurses assess as well as medication regime    Problem: Coping, Compromised Individual (Adult,Obstetrics,Pediatric)  Goal: Effective Coping  Patient will apply her coping skills, actively deal with grief issues, and talk with nursing staff about her issues in an appropriate manner.   Patient is using her journaling to manage her feelings, as her coping skill, patient states this does work for her. Patient also uses alpha stim for anxiety, which is also effective. Patient does speak with nurse several times t/o the shift in re to her feelings.

## 2018-09-04 NOTE — PLAN OF CARE
Face to face end of shift report received from Laura MEDRANO. Rounding completed. Patient observed.     Laura Nettles  9/3/2018  11:41 PM

## 2018-09-04 NOTE — PATIENT INSTRUCTIONS
Thank you for allowing Dr. Garces and our surgical team to participate in your care. Please call with any scheduling questions or concerns to Kamille at [phone]   [facility] [phone]    Patient is cleared to restart chemotherapy per

## 2018-09-04 NOTE — PROGRESS NOTES
"[facility] Progress Note    HPI: 28 y.o. Female with history of stage II left breast cancer undergoing adjuvant chemotherapy. Found to have painful swelling and redness under right mastectomy scar. Aspirated in clinic on 8/27 and found to be consistent with infected, strep, seroma. Unclear if this pocket communicates with port. Completed 10 day course of antibiotics yesterday.          S: Doing well no pain, small pocket of fluid present but no redness.     O:   Vitals:  /66 (BP Location: Right arm, Patient Position: Sitting, Cuff Size: Adult Regular)  Pulse 101  Temp 98.7  F (37.1  C) (Tympanic)  Resp 18  Ht 5' 5\" (1.651 m)  Wt 220 lb (99.8 kg)  SpO2 93%  BMI 36.61 kg/m2      Physical Exam:  G: alert oriented, no acute distress   ENT: sclera non-icteric   Right breast: no erythema, small fluid just inferior to the port     Assessment/Plan:  No erythema noted now off antibiotics, do not feel she would benefit from further intervention at this visit. Ok to resume chemotherapy from my standpoint, if infection recurs my presumption would be that the port hub is seeded and will need to be removed, but as this is her last chemotherapy believe it is ok to use at this time as there is no evidence of infection at this time.  and can follow up PRN to have port removed.     Murray Garces     "

## 2018-09-04 NOTE — PLAN OF CARE
Problem: Patient Care Overview  Goal: Individualization & Mutuality  Patient will be compliant with treatment team recommendations.  Patient will report at least 6-8 hours of sleep each night.    8/21/18 Patient may keep wet wipes in her bathroom per patient care order for proper hygiene after chemo treatments.  Patient will attend groups.   Patient is unable to have a second scrub jacket as patient has been using jackets inappropriately.    Patient asleep this shift, normal respirations and positional changes this shift

## 2018-09-04 NOTE — PLAN OF CARE
"Problem: Patient Care Overview  Goal: Individualization & Mutuality  Patient will be compliant with treatment team recommendations.  Patient will report at least 6-8 hours of sleep each night.    8/21/18 Patient may keep wet wipes in her bathroom per patient care order for proper hygiene after chemo treatments.  Patient will attend groups.   Patient is unable to have a second scrub jacket as patient has been using jackets inappropriately.    Outcome: Therapy, progress toward functional goals is gradual  Pt. Compliant with treatment team recommendations, slept 7 hours last night, attending group therapy sessions this shift, returned from chemotherapy at 4:40 pm, pt. States \"it went well\", has been up and about on unit, used alpha-stim per request, will continue to monitor progress.    Problem: Coping, Compromised Individual (Adult,Obstetrics,Pediatric)  Goal: Effective Coping  Patient will apply her coping skills, actively deal with grief issues, and talk with nursing staff about her issues in an appropriate manner.   Outcome: Therapy, progress toward functional goals is gradual  Pt. States she has been using coping skills at times to deal with any issues as they arise, talking with nursing staff frequently, will continue to monitor.      "

## 2018-09-04 NOTE — PLAN OF CARE
Problem: Patient Care Overview  Goal: Individualization & Mutuality  Patient will be compliant with treatment team recommendations.  Patient will report at least 6-8 hours of sleep each night.    8/21/18 Patient may keep wet wipes in her bathroom per patient care order for proper hygiene after chemo treatments.  Patient will attend groups.   Patient is unable to have a second scrub jacket as patient has been using jackets inappropriately.    Outcome: No Change  Pt I sup this morning, she is in the lounge when nurse arrives. Pt is cooperative with nursing assessment, pt states she is excited to get her Chemo treatment completed. Pt states she wants to finish her treatment so she can feel like she can move forward. Pt is compliant with medications. She does states a great frustration this morning, she states she could hear nurse talking in the nurses station and believes nurse was calling her disgusting. Nurse was talking with another nurse and we did talk about something being disgusting. Nurse explained this to patient however, nurse was not talking about any patient. Pt does calm down and states an understanding. Pt does take a shower, she eats well at breakfast. Pt does request PRN medication prior to going to an appointment with surgeon. Pt is given Risperdal, Ativan, and Benadryl at 0912.   Pt does attend her appointment with Surgeon she returns and reports to this nurse that her infection has cleared in her port and that she may not be getting her Chemo today, she states she is very upset about this because she just wanted to be done with her Chemo treatments, pt is tearful, she talks about the lose of control in her life. Pt does report she feels like a guinea pig today having a student working with her. Pt requests PRN Gabapentin and Hydroxyzine, nurse does administer these at 1108.     1242 nurse receives a call that patient is able to have her Chemo treatment today. Pt is transported via wheelchair down  stairs to chemotherapy department.     Problem: Coping, Compromised Individual (Adult,Obstetrics,Pediatric)  Goal: Effective Coping  Patient will apply her coping skills, actively deal with grief issues, and talk with nursing staff about her issues in an appropriate manner.   Outcome: No Change  Pt does use Alpha stim today as well as PRN medication. Pt does state she is using meditation as well.

## 2018-09-04 NOTE — NURSING NOTE
"Chief Complaint   Patient presents with     Surgical Followup     check seroma on right chest       Initial /66 (BP Location: Right arm, Patient Position: Sitting, Cuff Size: Adult Regular)  Pulse 101  Temp 98.7  F (37.1  C) (Tympanic)  Resp 18  Ht 5' 5\" (1.651 m)  Wt 220 lb (99.8 kg)  SpO2 93%  BMI 36.61 kg/m2 Estimated body mass index is 36.61 kg/(m^2) as calculated from the following:    Height as of this encounter: 5' 5\" (1.651 m).    Weight as of this encounter: 220 lb (99.8 kg).  Medication Reconciliation: complete    Jesica Ingram LPN    "

## 2018-09-04 NOTE — PLAN OF CARE
Face to face end of shift report received from Mary UMANA RN. Rounding completed. Patient observed.     Brittny Mcpherson  9/4/2018  4:40 PM

## 2018-09-04 NOTE — PLAN OF CARE
Face to face end of shift report received from Laura WILKINSON RN. Rounding completed. Patient observed in [facility] [place] [place].     Mary Velasquez  9/4/2018  7:36 AM

## 2018-09-04 NOTE — PLAN OF CARE
Problem: Patient Care Overview  Goal: Team Discussion  Team Plan:   BEHAVIORAL TEAM DISCUSSION    Participants: Jenni Varma NP, Haily Tomlinson NP, Amberly Sommers NP, Renetta Quinteros LSW, Geno ABRAHAM, Edie Eugene RN, Venus Lyons RN, Amelie Cleaning RN, Janice Garcia Recreation Therapy, Haily Fuchs OT, Vanessa Diez OT  Progress: fair, irritable  Continued Stay Criteria/Rationale: chemo, irritable, labile  Medical/Physical: chemo - active breast cancer  Precautions:   Behavioral Orders   Procedures     Code 1 - Restrict to Unit     Routine Programming     As clinically indicated     Status 15     Every 15 minutes.     Plan: DC to [facility] when stabilized and room is ready  Rationale for change in precautions or plan: None

## 2018-09-04 NOTE — MR AVS SNAPSHOT
After Visit Summary   9/4/2018    Marti Javier    MRN: [id number]           Patient Information     Date Of Birth          1990        Visit Information        Provider Department      9/4/2018 11:00 AM [facility] Garces MD [facility]        Care Instructions    Thank you for allowing Dr. Garces and our surgical team to participate in your care. Please call with any scheduling questions or concerns to Kamille at [phone]   [facility] [phone]    Patient is cleared to restart chemotherapy per           Follow-ups after your visit        Your next 10 appointments already scheduled     Sep 04, 2018 11:00 AM CDT   (Arrive by 10:45 AM)   Return Visit with [facility]ces MD   [facility] ([facility] [place] )    [address]  [place] [address] [phone]            Sep 04, 2018 12:30 PM CDT   Level 4 with HC INF RM 3302   [facility] ([facility] [place] )    [address]  [place] [address] [phone]              Who to contact     If you have questions or need follow up information about today's clinic visit or your schedule please contact [facility] directly at [phone].  Normal or non-critical lab and imaging results will be communicated to you by NearVersehart, letter or phone within 4 business days after the clinic has received the results. If you do not hear from us within 7 days, please contact the clinic through NearVersehart or phone. If you have a critical or abnormal lab result, we will notify you by phone as soon as possible.  Submit refill requests through CellScope or call your pharmacy and they will forward the refill request to us. Please allow 3 business days for your refill to be completed.          Additional Information About Your Visit        [facility] Information     CellScope lets you send messages to your doctor, view your test results, renew your prescriptions, schedule  "appointments and more. To sign up, go to [email or web address] . Click on \"Log in\" on the left side of the screen, which will take you to the Welcome page. Then click on \"Sign up Now\" on the right side of the page.     You will be asked to enter the access code listed below, as well as some personal information. Please follow the directions to create your username and password.     Your access code is: [email or web address]  Expires: 2018 10:44 AM     Your access code will  in 90 days. If you need help or a new code, please call your [place] clinic or [phone].        Care EveryWhere ID     This is your Care EveryWhere ID. This could be used by other organizations to access your [place] medical records  [id number]        Your Vitals Were     Pulse Temperature Respirations Height Pulse Oximetry BMI (Body Mass Index)    101 98.7  F (37.1  C) (Tympanic) 18 5' 5\" (1.651 m) 93% 36.61 kg/m2       Blood Pressure from Last 3 Encounters:   18 110/66   18 118/68   18 116/70    Weight from Last 3 Encounters:   18 220 lb (99.8 kg)   18 220 lb (99.8 kg)   18 219 lb (99.3 kg)              Today, you had the following     No orders found for display         Today's Medication Changes      Notice     This visit is during an admission. Changes to the med list made in this visit will be reflected in the After Visit Summary of the admission.             Primary Care Provider Fax #    Physician No Ref-Primary [phone]       No address on file        Equal Access to Services     SHARI JOHNSON : Milan Rodríguez, jah kay, phill hooks. [facility] [phone].    ATENCIÓN: Si habla español, tiene a downey disposición servicios gratuitos de asistencia lingüística. Llame al [phone].    We comply with applicable federal civil rights laws and [place] laws. We do not discriminate on the basis of race, color, " national origin, age, disability, sex, sexual orientation, or gender identity.            Thank you!     Thank you for choosing [facility]  for your care. Our goal is always to provide you with excellent care. Hearing back from our patients is one way we can continue to improve our services. Please take a few minutes to complete the written survey that you may receive in the mail after your visit with us. Thank you!             Your Updated Medication List - Protect others around you: Learn how to safely use, store and throw away your medicines at www.disposemymeds.org.      Notice     This visit is during an admission. Changes to the med list made in this visit will be reflected in the After Visit Summary of the admission.

## 2018-09-05 PROCEDURE — A9270 NON-COVERED ITEM OR SERVICE: HCPCS | Mod: GY | Performed by: NURSE PRACTITIONER

## 2018-09-05 PROCEDURE — 12400011

## 2018-09-05 PROCEDURE — 97140 MANUAL THERAPY 1/> REGIONS: CPT | Mod: GO

## 2018-09-05 PROCEDURE — 25000132 ZZH RX MED GY IP 250 OP 250 PS 637: Mod: GY | Performed by: NURSE PRACTITIONER

## 2018-09-05 PROCEDURE — 97530 THERAPEUTIC ACTIVITIES: CPT | Mod: GO

## 2018-09-05 PROCEDURE — 99232 SBSQ HOSP IP/OBS MODERATE 35: CPT | Performed by: NURSE PRACTITIONER

## 2018-09-05 PROCEDURE — 40000133 ZZH STATISTIC OT WARD VISIT

## 2018-09-05 RX ORDER — LORAZEPAM 0.5 MG/1
1-1.5 TABLET ORAL 3 TIMES DAILY PRN
Status: DISCONTINUED | OUTPATIENT
Start: 2018-09-05 | End: 2018-09-12 | Stop reason: HOSPADM

## 2018-09-05 RX ADMIN — Medication 1 G: at 20:18

## 2018-09-05 RX ADMIN — NICOTINE POLACRILEX 4 MG: 2 GUM, CHEWING ORAL at 12:35

## 2018-09-05 RX ADMIN — LORAZEPAM 1 MG: 0.5 TABLET ORAL at 05:47

## 2018-09-05 RX ADMIN — NICOTINE POLACRILEX 4 MG: 2 GUM, CHEWING ORAL at 22:19

## 2018-09-05 RX ADMIN — AMANTADINE 100 MG: 100 CAPSULE ORAL at 08:37

## 2018-09-05 RX ADMIN — Medication 25 MG: at 08:29

## 2018-09-05 RX ADMIN — DIPHENHYDRAMINE HYDROCHLORIDE 50 MG: 50 CAPSULE ORAL at 16:32

## 2018-09-05 RX ADMIN — GABAPENTIN 300 MG: 300 CAPSULE ORAL at 17:17

## 2018-09-05 RX ADMIN — CLONIDINE HYDROCHLORIDE 0.1 MG: 0.1 TABLET ORAL at 08:33

## 2018-09-05 RX ADMIN — CYANOCOBALAMIN TAB 1000 MCG 1000 MCG: 1000 TAB at 08:35

## 2018-09-05 RX ADMIN — CLONIDINE HYDROCHLORIDE 0.1 MG: 0.1 TABLET ORAL at 20:18

## 2018-09-05 RX ADMIN — NICOTINE POLACRILEX 4 MG: 2 GUM, CHEWING ORAL at 20:07

## 2018-09-05 RX ADMIN — Medication 250 MG: at 08:28

## 2018-09-05 RX ADMIN — NICOTINE POLACRILEX 4 MG: 2 GUM, CHEWING ORAL at 07:28

## 2018-09-05 RX ADMIN — GABAPENTIN 600 MG: 300 CAPSULE ORAL at 16:32

## 2018-09-05 RX ADMIN — CELECOXIB 200 MG: 100 CAPSULE ORAL at 08:36

## 2018-09-05 RX ADMIN — GABAPENTIN 600 MG: 300 CAPSULE ORAL at 20:18

## 2018-09-05 RX ADMIN — Medication 250 MG: at 20:18

## 2018-09-05 RX ADMIN — LAMOTRIGINE 50 MG: 25 TABLET ORAL at 20:18

## 2018-09-05 RX ADMIN — HYDROXYZINE PAMOATE 100 MG: 50 CAPSULE ORAL at 23:58

## 2018-09-05 RX ADMIN — VITAMIN A, VITAMIN C, VITAMIN D-3, VITAMIN E, VITAMIN B-1, VITAMIN B-2, NIACIN, VITAMIN B-6, CALCIUM, IRON, ZINC, COPPER 1 TABLET: 4000; 120; 400; 22; 1.84; 3; 20; 10; 1; 12; 200; 27; 25; 2 TABLET ORAL at 08:31

## 2018-09-05 RX ADMIN — GABAPENTIN 600 MG: 300 CAPSULE ORAL at 12:34

## 2018-09-05 RX ADMIN — LORAZEPAM 1.5 MG: 0.5 TABLET ORAL at 22:18

## 2018-09-05 RX ADMIN — LAMOTRIGINE 100 MG: 100 TABLET ORAL at 08:39

## 2018-09-05 RX ADMIN — CELECOXIB 200 MG: 100 CAPSULE ORAL at 20:18

## 2018-09-05 RX ADMIN — LORAZEPAM 1.5 MG: 0.5 TABLET ORAL at 16:32

## 2018-09-05 RX ADMIN — LORATADINE 10 MG: 10 TABLET ORAL at 08:32

## 2018-09-05 RX ADMIN — CLONIDINE HYDROCHLORIDE 0.1 MG: 0.1 TABLET ORAL at 13:25

## 2018-09-05 RX ADMIN — RISPERIDONE 1 MG: 1 TABLET, ORALLY DISINTEGRATING ORAL at 05:47

## 2018-09-05 RX ADMIN — NICOTINE POLACRILEX 4 MG: 2 GUM, CHEWING ORAL at 05:47

## 2018-09-05 RX ADMIN — LAMOTRIGINE 100 MG: 100 TABLET ORAL at 20:19

## 2018-09-05 RX ADMIN — RANITIDINE 150 MG: 150 TABLET ORAL at 20:18

## 2018-09-05 RX ADMIN — RANITIDINE 150 MG: 150 TABLET ORAL at 08:37

## 2018-09-05 RX ADMIN — Medication 1 G: at 08:41

## 2018-09-05 RX ADMIN — NICOTINE POLACRILEX 4 MG: 2 GUM, CHEWING ORAL at 09:18

## 2018-09-05 RX ADMIN — RISPERIDONE 1 MG: 1 TABLET, ORALLY DISINTEGRATING ORAL at 12:34

## 2018-09-05 RX ADMIN — NICOTINE 1 PATCH: 21 PATCH, EXTENDED RELEASE TRANSDERMAL at 08:34

## 2018-09-05 RX ADMIN — AMANTADINE 100 MG: 100 CAPSULE ORAL at 20:18

## 2018-09-05 RX ADMIN — VITAMIN D, TAB 1000IU (100/BT) 2000 UNITS: 25 TAB at 08:31

## 2018-09-05 RX ADMIN — GABAPENTIN 600 MG: 300 CAPSULE ORAL at 08:34

## 2018-09-05 RX ADMIN — LAMOTRIGINE 75 MG: 25 TABLET ORAL at 08:29

## 2018-09-05 ASSESSMENT — ACTIVITIES OF DAILY LIVING (ADL)
ORAL_HYGIENE: INDEPENDENT
DRESS: SCRUBS (BEHAVIORAL HEALTH);INDEPENDENT
GROOMING: INDEPENDENT
LAUNDRY: UNABLE TO COMPLETE
ORAL_HYGIENE: INDEPENDENT
GROOMING: INDEPENDENT
DRESS: SCRUBS (BEHAVIORAL HEALTH)

## 2018-09-05 NOTE — PLAN OF CARE
Face to face end of shift report received from Angie ROMAN RN. Rounding completed. Patient observed.     Mary Velasquez  9/5/2018  7:47 AM

## 2018-09-05 NOTE — PROGRESS NOTES
[facility]  Psychiatric Progress Note    Subjective     Marti is pleasant ad cooperative when I see her today. She tells me she is working on herself and is done trying to help other people. Marti reports she can empathize with them but she is not going to pity them or offer help when its not appreciated. Marti does also discuss her fear about her PET scan coming back with indications the cancer is still there. She does admit the unknown about her cancer makes her very nervous.       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          Medications:       amantadine  100 mg Oral BID     celecoxib  200 mg Oral BID     cholecalciferol  2,000 Units Oral Daily     cloNIDine  0.1 mg Oral TID     cyanocobalamin  1,000 mcg Oral Daily     fish oil-omega-3 fatty acids  1 g Oral BID     gabapentin  600 mg Oral 4x Daily     lamoTRIgine  100 mg Oral BID     lamoTRIgine (LaMICtal) tablet 50 mg  50 mg Oral At Bedtime     lamoTRIgine  75 mg Oral Daily     loratadine  10 mg Oral Daily     nicotine  1 patch Transdermal Daily     nicotine   Transdermal Q8H     nicotine   Transdermal Daily     PNV PRENATAL PLUS MULTIVITAMIN  1 tablet Oral Daily     pyridOXINE  25 mg Oral Daily     ranitidine  150 mg Oral BID     risperiDONE microspheres  50 mg Intramuscular Q14 Days     saccharomyces boulardii  250 mg Oral BID     acetaminophen, alum & mag hydroxide-simethicone, amantadine, diphenhydrAMINE, gabapentin, hydrOXYzine (VISTARIL) capsule  mg, LORazepam,  "magnesium hydroxide, mineral oil-hydrophilic petrolatum, nicotine polacrilex, risperiDONE, selenium sulfide          Allergies:     Allergies   Allergen Reactions     Azithromycin Anaphylaxis     Abilify [Aripiprazole] Unknown     Wellbutrin [Bupropion] Other (See Comments)     suicidal     Zithromax [Azithromycin Dihydrate]           Psychiatric Examination:   /96  Pulse 103  Temp 98.9  F (37.2  C) (Tympanic)  Resp 16  Ht 1.651 m (5' 5\")  Wt 99.9 kg (220 lb 3.2 oz)  SpO2 98%  BMI 36.64 kg/m2  Weight is 220 lbs 3.2 oz  Body mass index is 36.64 kg/(m^2).    Appearance:  awake, alert, adequately groomed and dressed in hospital scrubs  Attitude:  cooperative  Eye Contact:  good  Mood:  irritable  Affect:  mood congruent, intensity is heightened and intensity is dramatic  Speech:  clear, coherent   Psychomotor Behavior:  no evidence of tardive dyskinesia, dystonia, or tics and intact station, gait and muscle tone  Thought Process:  Less disorganized  Associations:  Morris are less  Thought Content:  no evidence of suicidal ideation or homicidal ideation  Insight:  improving  Judgment:  fair  Oriented to:  time, person, and place  Attention Span and Concentration:  fair  Recent and Remote Memory:  fair  Fund of Knowledge: appropriate  Muscle Strength and Tone: normal  Gait and Station: Normal  Perception: no perceptual disturbance noted         Labs:     No results found for this or any previous visit (from the past 24 hour(s)).      Assessment/ Plan:       Increase ativan prn to three times a day as needed    Will not increase lamictal today. Was just increased.     ELOS:  >5 days due to completion of cancer infusions on 8/30/18 - next treatment Tuesday 9/4  Discharge to safe, supportive environment.   Recommend close to [facility] so can continue Cancer treatments along with support groups for low anxiety.  "

## 2018-09-05 NOTE — PLAN OF CARE
Problem: Patient Care Overview  Goal: Individualization & Mutuality  Patient will be compliant with treatment team recommendations.  Patient will report at least 6-8 hours of sleep each night.    8/21/18 Patient may keep wet wipes in her bathroom per patient care order for proper hygiene after chemo treatments.  Patient will attend groups.   Patient is unable to have a second scrub jacket as patient has been using jackets inappropriately.    Outcome: Therapy, progress toward functional goals is gradual  Pt. Has been up and about in dayroom, resting in bed after supper, has not attended any group therapy sessions yet this shift, slept 4.5 hours last night, will continue to monitor progress.    Problem: Coping, Compromised Individual (Adult,Obstetrics,Pediatric)  Goal: Effective Coping  Patient will apply her coping skills, actively deal with grief issues, and talk with nursing staff about her issues in an appropriate manner.   Outcome: Therapy, progress toward functional goals is gradual  Pt. Is able to carry on a linear conversation with this writer, states she has been using her coping skills when needed.  1715-  Pt. Requested/received gabapentin 300 mg po for anxiety.  2230-  Pt. Requested/received alpha-stim for 20 minutes.

## 2018-09-05 NOTE — PLAN OF CARE
Problem: Patient Care Overview  Goal: Team Discussion  Team Plan:   BEHAVIORAL TEAM DISCUSSION    Participants:  Kusum Cota NP, Toya Wolf NP, Renetta Quinteros LSW, Geno Hargrove LSW, Edie Eugene RN, Venus Lyons RN, Amelie Cleaning RN, Janice Garcia Recreation Therapy, [facility] OT, Haily Fuchs OT  Progress: good, positive, anxious  Continued Stay Criteria/Rationale: increase Lamictal in a few days  Medical/Physical: chemo - active breast cancer  Precautions:   Behavioral Orders   Procedures     Code 1 - Restrict to Unit     Routine Programming     As clinically indicated     Status 15     Every 15 minutes.     Plan: DC to [facility] [place]  Rationale for change in precautions or plan: none

## 2018-09-05 NOTE — PLAN OF CARE
Face to face end of shift report received from Mary UMANA RN. Rounding completed. Patient observed.     Brittny Mcpherson  9/5/2018  3:47 PM

## 2018-09-05 NOTE — PLAN OF CARE
Problem: Patient Care Overview  Goal: Individualization & Mutuality  Patient will be compliant with treatment team recommendations.  Patient will report at least 6-8 hours of sleep each night.    8/21/18 Patient may keep wet wipes in her bathroom per patient care order for proper hygiene after chemo treatments.  Patient will attend groups.   Patient is unable to have a second scrub jacket as patient has been using jackets inappropriately.    Pt awake at beginning of this shift, pt was in lobby completing Alpha Stim cycle with supervision. Pt appeared to be sleeping after 0045 checks, normal respirations and position changes. Pt awake and walking hallway after 0515 checks. Pt given Ativan 1 mg, Risperidone 1 mg at 0547 per request for increased anxiety/pacing.

## 2018-09-05 NOTE — PLAN OF CARE
Face to face end of shift report received from Brittny MEDRANO. Rounding completed. Patient observed.     Angie Savage  9/4/2018  11:33 PM

## 2018-09-05 NOTE — PLAN OF CARE
Problem: Patient Care Overview  Goal: Discharge Needs Assessment  Outcome: Improving  Sent email to pt's JORDON Adams this morning for update on referral to [facility].

## 2018-09-05 NOTE — PLAN OF CARE
Problem: Patient Care Overview  Goal: Individualization & Mutuality  Patient will be compliant with treatment team recommendations.  Patient will report at least 6-8 hours of sleep each night.    8/21/18 Patient may keep wet wipes in her bathroom per patient care order for proper hygiene after chemo treatments.  Patient will attend groups.   Patient is unable to have a second scrub jacket as patient has been using jackets inappropriately.    Outcome: Improving  Pt is up on the unit when nurse arrives, pt is social with peers. She does not attend groups today. Pt does talk with nurse today about changes she would like to make in her personal life and things she can do for her well being such as changing her views and ways of thinking. Such as decreasing her co-dependency and who successful she has been to this point in her life.     Problem: Coping, Compromised Individual (Adult,Obstetrics,Pediatric)  Goal: Effective Coping  Patient will apply her coping skills, actively deal with grief issues, and talk with nursing staff about her issues in an appropriate manner.   Outcome: Improving  Pt does talk about her loses in her life and her continuation of coping with these.     1234 pt is in her she is tearful and talks with nurse about how she has been trying to change how things affect her. Pt does receive Risperdal and Alpha Stim as requested. Pt does request Ativan as well. Nurse reminds her that she can only receive this 2  Times a day and she would be getting her second dose. Pt is encouraged to use coping skills. Pt does state she is going to try coping skills. Pt does attend groups.

## 2018-09-05 NOTE — PROGRESS NOTES
09/05/18 0900   Signing Clinician's Name / Credentials   Signing clinician's name / credentials Marilyn Irvin OTR/L   Quick Adds   Rehab Discipline OT   Therapeutic Activity   Minutes of Treatment 25 minutes   Symptoms Noted During/After Treatment None   Treatment Detail Completed Chakra connection and chakra energizing technique with patient.  The application is to promote relaxation, well being, establishes flow post trauma such as chemotherapy. Patient reports that her last chemotherapy treatment was yesterday. She is happy that this is completed.    Intervention (Other)   Intervention (Other) Minutes of Treatment 15   Symptoms Noted During/After Treatment none   Intervention (Other) Detail Pt. currently asymptomatic for UE edema/lymphedema. Pt. currently done with antibiodics for infection and thus began education for edema management. Discussed  clearing the ditch for edema management. Went over axillary, terminus, axilla to axilla, and axilla to inguinal node self massage. Pt. would like to continue with further education as she doesn't feel comfortable after 1 day of instruction for independence.    Total Session Time   Total Session Time (minutes) 40 minutes

## 2018-09-06 PROCEDURE — A9270 NON-COVERED ITEM OR SERVICE: HCPCS | Mod: GY | Performed by: NURSE PRACTITIONER

## 2018-09-06 PROCEDURE — 40000133 ZZH STATISTIC OT WARD VISIT

## 2018-09-06 PROCEDURE — 99232 SBSQ HOSP IP/OBS MODERATE 35: CPT | Performed by: NURSE PRACTITIONER

## 2018-09-06 PROCEDURE — 12400011

## 2018-09-06 PROCEDURE — 97530 THERAPEUTIC ACTIVITIES: CPT | Mod: GO

## 2018-09-06 PROCEDURE — 25000132 ZZH RX MED GY IP 250 OP 250 PS 637: Mod: GY | Performed by: NURSE PRACTITIONER

## 2018-09-06 PROCEDURE — 97140 MANUAL THERAPY 1/> REGIONS: CPT | Mod: GO

## 2018-09-06 RX ADMIN — LAMOTRIGINE 100 MG: 100 TABLET ORAL at 08:50

## 2018-09-06 RX ADMIN — LAMOTRIGINE 175 MG: 25 TABLET ORAL at 20:06

## 2018-09-06 RX ADMIN — RANITIDINE 150 MG: 150 TABLET ORAL at 08:19

## 2018-09-06 RX ADMIN — GABAPENTIN 600 MG: 300 CAPSULE ORAL at 08:18

## 2018-09-06 RX ADMIN — CLONIDINE HYDROCHLORIDE 0.1 MG: 0.1 TABLET ORAL at 08:18

## 2018-09-06 RX ADMIN — NICOTINE POLACRILEX 4 MG: 2 GUM, CHEWING ORAL at 15:25

## 2018-09-06 RX ADMIN — CLONIDINE HYDROCHLORIDE 0.1 MG: 0.1 TABLET ORAL at 13:13

## 2018-09-06 RX ADMIN — LAMOTRIGINE 75 MG: 25 TABLET ORAL at 08:18

## 2018-09-06 RX ADMIN — AMANTADINE 100 MG: 100 CAPSULE ORAL at 08:19

## 2018-09-06 RX ADMIN — Medication 250 MG: at 08:19

## 2018-09-06 RX ADMIN — CLONIDINE HYDROCHLORIDE 0.1 MG: 0.1 TABLET ORAL at 20:06

## 2018-09-06 RX ADMIN — Medication 1 G: at 20:05

## 2018-09-06 RX ADMIN — Medication 25 MG: at 08:18

## 2018-09-06 RX ADMIN — GABAPENTIN 600 MG: 300 CAPSULE ORAL at 20:06

## 2018-09-06 RX ADMIN — NICOTINE 1 PATCH: 21 PATCH, EXTENDED RELEASE TRANSDERMAL at 08:19

## 2018-09-06 RX ADMIN — LORAZEPAM 1.5 MG: 0.5 TABLET ORAL at 20:11

## 2018-09-06 RX ADMIN — NICOTINE POLACRILEX 4 MG: 2 GUM, CHEWING ORAL at 21:11

## 2018-09-06 RX ADMIN — VITAMIN A, VITAMIN C, VITAMIN D-3, VITAMIN E, VITAMIN B-1, VITAMIN B-2, NIACIN, VITAMIN B-6, CALCIUM, IRON, ZINC, COPPER 1 TABLET: 4000; 120; 400; 22; 1.84; 3; 20; 10; 1; 12; 200; 27; 25; 2 TABLET ORAL at 08:18

## 2018-09-06 RX ADMIN — GABAPENTIN 600 MG: 300 CAPSULE ORAL at 16:22

## 2018-09-06 RX ADMIN — GABAPENTIN 600 MG: 300 CAPSULE ORAL at 13:13

## 2018-09-06 RX ADMIN — Medication 250 MG: at 20:06

## 2018-09-06 RX ADMIN — LORAZEPAM 1.5 MG: 0.5 TABLET ORAL at 08:50

## 2018-09-06 RX ADMIN — HYDROXYZINE PAMOATE 100 MG: 50 CAPSULE ORAL at 17:03

## 2018-09-06 RX ADMIN — VITAMIN D, TAB 1000IU (100/BT) 2000 UNITS: 25 TAB at 08:18

## 2018-09-06 RX ADMIN — RANITIDINE 150 MG: 150 TABLET ORAL at 20:06

## 2018-09-06 RX ADMIN — NICOTINE POLACRILEX 4 MG: 2 GUM, CHEWING ORAL at 09:50

## 2018-09-06 RX ADMIN — NICOTINE POLACRILEX 4 MG: 2 GUM, CHEWING ORAL at 13:14

## 2018-09-06 RX ADMIN — CELECOXIB 200 MG: 100 CAPSULE ORAL at 20:05

## 2018-09-06 RX ADMIN — LORAZEPAM 1.5 MG: 0.5 TABLET ORAL at 15:50

## 2018-09-06 RX ADMIN — RISPERIDONE 1 MG: 1 TABLET, ORALLY DISINTEGRATING ORAL at 17:03

## 2018-09-06 RX ADMIN — Medication 1 G: at 08:18

## 2018-09-06 RX ADMIN — CELECOXIB 200 MG: 100 CAPSULE ORAL at 08:17

## 2018-09-06 RX ADMIN — CYANOCOBALAMIN TAB 1000 MCG 1000 MCG: 1000 TAB at 08:50

## 2018-09-06 RX ADMIN — AMANTADINE 100 MG: 100 CAPSULE ORAL at 20:06

## 2018-09-06 RX ADMIN — LORATADINE 10 MG: 10 TABLET ORAL at 08:19

## 2018-09-06 ASSESSMENT — ACTIVITIES OF DAILY LIVING (ADL)
ORAL_HYGIENE: INDEPENDENT
LAUNDRY: UNABLE TO COMPLETE
DRESS: SCRUBS (BEHAVIORAL HEALTH);INDEPENDENT
DRESS: SCRUBS (BEHAVIORAL HEALTH)
GROOMING: INDEPENDENT
GROOMING: INDEPENDENT
ORAL_HYGIENE: INDEPENDENT

## 2018-09-06 NOTE — PLAN OF CARE
Face to face end of shift report received from Mary UMANA RN. Rounding completed. Patient observed.     Brittny Mcpherson  9/6/2018  3:46 PM

## 2018-09-06 NOTE — PROGRESS NOTES
09/06/18 1100   Signing Clinician's Name / Credentials   Signing clinician's name / credentials Marilyn Irvin OTR/L   Quick Adds   Rehab Discipline OT   Intervention (Other)   Intervention (Other) Minutes of Treatment 15   Symptoms Noted During/After Treatment none   Intervention (Other) Detail Went over manual lymphedema massage. Pt. able to independently remember 1/2 of the routine. She states that she is going to practice 1 more time tomorrow.    Total Session Time   Total Session Time (minutes) 15 minutes

## 2018-09-06 NOTE — PLAN OF CARE
"Problem: Patient Care Overview  Goal: Individualization & Mutuality  Patient will be compliant with treatment team recommendations.  Patient will report at least 6-8 hours of sleep each night.    8/21/18 Patient may keep wet wipes in her bathroom per patient care order for proper hygiene after chemo treatments.  Patient will attend groups.   Patient is unable to have a second scrub jacket as patient has been using jackets inappropriately.    Outcome: Therapy, progress toward functional goals is gradual  Pt. Compliant with treatment team recommendations, slept 4 hours last night, affect is flat and blunted, states \"I feel sad today, because I'm sick of being here and I'm sad because my family doesn't come to see me or even call\", emotional support given, pt. Requesting prn medication for anxiety/agitation, will continue to monitor.    Problem: Coping, Compromised Individual (Adult,Obstetrics,Pediatric)  Goal: Effective Coping  Patient will apply her coping skills, actively deal with grief issues, and talk with nursing staff about her issues in an appropriate manner.   Outcome: Therapy, progress toward functional goals is gradual  Pt. Talking openly with staff about her issues, coloring to help with anxiety/agitation.  1550- Pt. Requested/received ativan 1.5 mg po for anxiety/agitation.  1705- Pt. Requested/received vistaril 100 mg po and risperdal 1 mg po for agitation.  "

## 2018-09-06 NOTE — PROGRESS NOTES
[facility]  Psychiatric Progress Note    Subjective     Marti is up and social in the [facility] area. She is able to carry on a linear and logical conversation at this time. She is future and goal oriented. Marti is looking forward to discharge and moving forward. She has several hobbies she would like to pursue, one is trying to write some of her experience with her mental health and chemical dependency issues and what she has experienced and learned. Encouraged her to try journaling and taking it from there. She continues to feel her mood is up and down but does feeel she needs individual therapy more than medications at this time to help deal with it.        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          Medications:       amantadine  100 mg Oral BID     celecoxib  200 mg Oral BID     cholecalciferol  2,000 Units Oral Daily     cloNIDine  0.1 mg Oral TID     cyanocobalamin  1,000 mcg Oral Daily     fish oil-omega-3 fatty acids  1 g Oral BID     gabapentin  600 mg Oral 4x Daily     lamoTRIgine  175 mg Oral BID     loratadine  10 mg Oral Daily     nicotine  1 patch Transdermal Daily     nicotine   Transdermal Q8H     nicotine   Transdermal Daily     PNV PRENATAL PLUS MULTIVITAMIN  1 tablet Oral Daily     pyridOXINE  25 mg Oral Daily     ranitidine  150 mg Oral BID     risperiDONE microspheres  50 mg Intramuscular Q14 Days     saccharomyces boulardii  250 mg Oral BID     acetaminophen, alum & mag hydroxide-simethicone, amantadine,  "diphenhydrAMINE, gabapentin, hydrOXYzine (VISTARIL) capsule  mg, LORazepam, magnesium hydroxide, mineral oil-hydrophilic petrolatum, nicotine polacrilex, risperiDONE, selenium sulfide          Allergies:     Allergies   Allergen Reactions     Azithromycin Anaphylaxis     Abilify [Aripiprazole] Unknown     Wellbutrin [Bupropion] Other (See Comments)     suicidal     Zithromax [Azithromycin Dihydrate]           Psychiatric Examination:   /85  Pulse 83  Temp 97.3  F (36.3  C) (Tympanic)  Resp 14  Ht 1.651 m (5' 5\")  Wt 99.9 kg (220 lb 3.2 oz)  SpO2 97%  BMI 36.64 kg/m2  Weight is 220 lbs 3.2 oz  Body mass index is 36.64 kg/(m^2).    Appearance:  awake, alert, adequately groomed and dressed in hospital scrubs  Attitude:  cooperative  Eye Contact:  good  Mood:  irritable  Affect:  mood congruent, intensity is heightened and intensity is dramatic  Speech:  clear, coherent   Psychomotor Behavior:  no evidence of tardive dyskinesia, dystonia, or tics and intact station, gait and muscle tone  Thought Process:  Less disorganized  Associations:  Morris are less  Thought Content:  no evidence of suicidal ideation or homicidal ideation  Insight:  improving  Judgment:  fair  Oriented to:  time, person, and place  Attention Span and Concentration:  fair  Recent and Remote Memory:  fair  Fund of Knowledge: appropriate  Muscle Strength and Tone: normal  Gait and Station: Normal  Perception: no perceptual disturbance noted         Labs:     No results found for this or any previous visit (from the past 24 hour(s)).      Assessment/ Plan:   Increase Lamictal to 175 twice a day    ELOS:  >5 days due to completion of cancer infusions on 8/30/18 - next treatment Tuesday 9/4  Discharge to safe, supportive environment.   Recommend close to [facility] so can continue Cancer treatments along with support groups for low anxiety.  "

## 2018-09-06 NOTE — PLAN OF CARE
Problem: Patient Care Overview  Goal: Individualization & Mutuality  Patient will be compliant with treatment team recommendations.  Patient will report at least 6-8 hours of sleep each night.    8/21/18 Patient may keep wet wipes in her bathroom per patient care order for proper hygiene after chemo treatments.  Patient will attend groups.   Patient is unable to have a second scrub jacket as patient has been using jackets inappropriately.    Outcome: Improving  Pt is up in the lounge when nurse arrives to this shift. Pt is calm and cooperative today, she eats her breakfast, compliant with medications. Pt does talk about high anxiety and feeling guilty for the way she treats people and her thoughts about people. Pt is cooperative with nursing assessment and does ramble when talking about her mood and personality and things she is working on in a personal aspect. Pt does request Alpha stim as well as her PRN Ativan.   0850 Pt is administered Ativan and Alpha stim. She states  This is helpful.   Pt remains in the lounge socializing with peers. She does attend groups today. Pt is out of her room throughout the day.     Problem: Coping, Compromised Individual (Adult,Obstetrics,Pediatric)  Goal: Effective Coping  Patient will apply her coping skills, actively deal with grief issues, and talk with nursing staff about her issues in an appropriate manner.   Outcome: Improving  Pt states she is using coping skills of meditation and trying to distract thoughts. Pt does talk on the phone with her sister. Pt states she feels better after talking with her family. Pt is seen singing on the phone and laughing when talking with her sister.

## 2018-09-06 NOTE — PLAN OF CARE
"Problem: Patient Care Overview  Goal: Discharge Needs Assessment  Outcome: Improving  Spoke with pt's CM Angie this morning- Angie states the staff at [facility] are supposed to get in touch with her today. Angie plans on talking to pt's sister to see if she would be able to transport pt home sometime next week back to her apartment until pt is able to get into [place]. Informed Angie staff will be needing to arrange remote PD so once a date is known to please keep staff updated.     Spoke with pt this morning and updated her on conversation with . Pt states she feels frustrated that she has to stay in the hospital a week after her last chemo treatment. Says she has been very patient and is ready to start her \"new life.\" States she is also going to call her CM and let her know how she feels about staying in the hospital until next week.    "

## 2018-09-06 NOTE — PLAN OF CARE
Face to face end of shift report received from Brittny MEDRANO. Rounding completed. Patient observed.     Angie Savage  9/5/2018  11:27 PM

## 2018-09-06 NOTE — PLAN OF CARE
Face to face end of shift report received from Angie ROMAN RN. Rounding completed. Patient observed in [facility].     Mary Velasquez  9/6/2018  7:51 AM

## 2018-09-06 NOTE — PROGRESS NOTES
09/06/18 1315   Signing Clinician's Name / Credentials   Signing clinician's name / credentials Vanessa Diez OTR/L   Quick Adds   Rehab Discipline OT   Therapeutic Activity   Minutes of Treatment 30 minutes   Symptoms Noted During/After Treatment None   Treatment Detail Assisted pt in making sensory tool for coping and calming.  Pt made weighted stuffed animal with 1:1 supervision to sew shut.  Reviewed sensory tool kit items to use at home for coping.  Healing touch modality completed with pt reporting decreased anxiety post HT (noels mind clear.)    Additional Documentation   Rehab Comments Pt also saw different OT today for specific lymph massage training.    OT Plan Cont - education on lymphedema massage   Total Session Time   Total Session Time (minutes) 30 minutes

## 2018-09-06 NOTE — PLAN OF CARE
Problem: Patient Care Overview  Goal: Team Discussion  Team Plan:   BEHAVIORAL TEAM DISCUSSION    Participants:  Kusum Cota NP, Toya Wolf NP, Renetta Quinteros LSW, Geno Hargrove LSW, Venus Lyons RN, Amelie Cleaning RN, Janice Garcia Recreation Therapy, Marilyn Irvin OT, Haily Fuchs OT, Vanessa Diez OT  Progress: good, focusing on herself  Continued Stay Criteria/Rationale: continue to stabilize and monitor for medication effectiveness and side effects  Medical/Physical: finished chemo, post double mastectomy  Precautions:   Behavioral Orders   Procedures     Code 1 - Restrict to Unit     Routine Programming     As clinically indicated     Status 15     Every 15 minutes.     Plan: DC next week back to her home to clean and pack then move to [facility], set up remote PD  Rationale for change in precautions or plan: None

## 2018-09-06 NOTE — PLAN OF CARE
Problem: Patient Care Overview  Goal: Individualization & Mutuality  Patient will be compliant with treatment team recommendations.  Patient will report at least 6-8 hours of sleep each night.    8/21/18 Patient may keep wet wipes in her bathroom per patient care order for proper hygiene after chemo treatments.  Patient will attend groups.   Patient is unable to have a second scrub jacket as patient has been using jackets inappropriately.    Pt awake and out in lobby at the beginning of this shift, given Vistaril 100 mg at 2358 per request for anxiety and sleep. Pt appeared to be sleeping after 0045 checks, normal respirations and position changes noted. Pt awake and out in lobby at 0430 writing in journal. Pt requested alpha stim at 0540.

## 2018-09-07 PROCEDURE — 25000132 ZZH RX MED GY IP 250 OP 250 PS 637: Mod: GY | Performed by: NURSE PRACTITIONER

## 2018-09-07 PROCEDURE — A9270 NON-COVERED ITEM OR SERVICE: HCPCS | Mod: GY | Performed by: NURSE PRACTITIONER

## 2018-09-07 PROCEDURE — 12400011

## 2018-09-07 PROCEDURE — 40000133 ZZH STATISTIC OT WARD VISIT

## 2018-09-07 PROCEDURE — 97140 MANUAL THERAPY 1/> REGIONS: CPT | Mod: GO

## 2018-09-07 RX ADMIN — NICOTINE POLACRILEX 4 MG: 2 GUM, CHEWING ORAL at 11:24

## 2018-09-07 RX ADMIN — NICOTINE POLACRILEX 4 MG: 2 GUM, CHEWING ORAL at 08:28

## 2018-09-07 RX ADMIN — Medication 1 G: at 08:29

## 2018-09-07 RX ADMIN — LAMOTRIGINE 175 MG: 25 TABLET ORAL at 08:28

## 2018-09-07 RX ADMIN — CYANOCOBALAMIN TAB 1000 MCG 1000 MCG: 1000 TAB at 08:28

## 2018-09-07 RX ADMIN — NICOTINE POLACRILEX 4 MG: 2 GUM, CHEWING ORAL at 18:54

## 2018-09-07 RX ADMIN — GABAPENTIN 600 MG: 300 CAPSULE ORAL at 08:28

## 2018-09-07 RX ADMIN — LORAZEPAM 1.5 MG: 0.5 TABLET ORAL at 13:08

## 2018-09-07 RX ADMIN — CELECOXIB 200 MG: 100 CAPSULE ORAL at 20:19

## 2018-09-07 RX ADMIN — NICOTINE POLACRILEX 4 MG: 2 GUM, CHEWING ORAL at 14:26

## 2018-09-07 RX ADMIN — NICOTINE POLACRILEX 4 MG: 2 GUM, CHEWING ORAL at 10:14

## 2018-09-07 RX ADMIN — Medication 25 MG: at 08:28

## 2018-09-07 RX ADMIN — NICOTINE POLACRILEX 4 MG: 2 GUM, CHEWING ORAL at 20:23

## 2018-09-07 RX ADMIN — Medication 250 MG: at 20:19

## 2018-09-07 RX ADMIN — CLONIDINE HYDROCHLORIDE 0.1 MG: 0.1 TABLET ORAL at 20:18

## 2018-09-07 RX ADMIN — AMANTADINE 100 MG: 100 CAPSULE ORAL at 08:28

## 2018-09-07 RX ADMIN — CELECOXIB 200 MG: 100 CAPSULE ORAL at 08:29

## 2018-09-07 RX ADMIN — NICOTINE POLACRILEX 4 MG: 2 GUM, CHEWING ORAL at 17:15

## 2018-09-07 RX ADMIN — AMANTADINE 100 MG: 100 CAPSULE ORAL at 20:18

## 2018-09-07 RX ADMIN — NICOTINE 1 PATCH: 21 PATCH, EXTENDED RELEASE TRANSDERMAL at 08:27

## 2018-09-07 RX ADMIN — LORAZEPAM 1.5 MG: 0.5 TABLET ORAL at 20:18

## 2018-09-07 RX ADMIN — RISPERIDONE 1 MG: 1 TABLET, ORALLY DISINTEGRATING ORAL at 14:25

## 2018-09-07 RX ADMIN — CLONIDINE HYDROCHLORIDE 0.1 MG: 0.1 TABLET ORAL at 13:06

## 2018-09-07 RX ADMIN — GABAPENTIN 600 MG: 300 CAPSULE ORAL at 13:06

## 2018-09-07 RX ADMIN — VITAMIN D, TAB 1000IU (100/BT) 2000 UNITS: 25 TAB at 08:28

## 2018-09-07 RX ADMIN — RANITIDINE 150 MG: 150 TABLET ORAL at 08:29

## 2018-09-07 RX ADMIN — RANITIDINE 150 MG: 150 TABLET ORAL at 20:18

## 2018-09-07 RX ADMIN — LAMOTRIGINE 175 MG: 25 TABLET ORAL at 20:18

## 2018-09-07 RX ADMIN — CLONIDINE HYDROCHLORIDE 0.1 MG: 0.1 TABLET ORAL at 08:29

## 2018-09-07 RX ADMIN — VITAMIN A, VITAMIN C, VITAMIN D-3, VITAMIN E, VITAMIN B-1, VITAMIN B-2, NIACIN, VITAMIN B-6, CALCIUM, IRON, ZINC, COPPER 1 TABLET: 4000; 120; 400; 22; 1.84; 3; 20; 10; 1; 12; 200; 27; 25; 2 TABLET ORAL at 08:29

## 2018-09-07 RX ADMIN — GABAPENTIN 600 MG: 300 CAPSULE ORAL at 20:19

## 2018-09-07 RX ADMIN — NICOTINE POLACRILEX 4 MG: 2 GUM, CHEWING ORAL at 07:00

## 2018-09-07 RX ADMIN — Medication 250 MG: at 08:29

## 2018-09-07 RX ADMIN — GABAPENTIN 600 MG: 300 CAPSULE ORAL at 16:49

## 2018-09-07 RX ADMIN — LORATADINE 10 MG: 10 TABLET ORAL at 08:29

## 2018-09-07 RX ADMIN — Medication 1 G: at 20:18

## 2018-09-07 ASSESSMENT — ACTIVITIES OF DAILY LIVING (ADL)
ORAL_HYGIENE: INDEPENDENT
DRESS: INDEPENDENT;SCRUBS (BEHAVIORAL HEALTH)
DRESS: INDEPENDENT;SCRUBS (BEHAVIORAL HEALTH)
GROOMING: INDEPENDENT
GROOMING: INDEPENDENT
LAUNDRY: UNABLE TO COMPLETE
LAUNDRY: UNABLE TO COMPLETE
ORAL_HYGIENE: INDEPENDENT

## 2018-09-07 NOTE — PLAN OF CARE
Problem: Patient Care Overview  Goal: Individualization & Mutuality  Patient will be compliant with treatment team recommendations.  Patient will report at least 6-8 hours of sleep each night.    8/21/18 Patient may keep wet wipes in her bathroom per patient care order for proper hygiene after chemo treatments.  Patient will attend groups.   Patient is unable to have a second scrub jacket as patient has been using jackets inappropriately.    Pt appeared to be sleeping most of this shift, normal respirations and position changes noted. Pt awake and out in lobby after 0600 vitals.

## 2018-09-07 NOTE — DISCHARGE INSTRUCTIONS
Behavioral Discharge Planning and Instructions    Summary: Patient was admitted with increased mental health symptoms     Main Diagnosis: schizoaffecitve disorder, bipolar type, Alcohol use disordr, amount currently used unknown, history of severe, Amphetamine and methaphetamine use disorder, 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    Symptoms to Report: feeling more aggressive, increased confusion, losing more sleep, mood getting worse or thoughts of suicide    Lifestyle Adjustment: Take all medications as prescribed, meet with doctor/ medication provider, out patient therapist, , and [facility] worker as scheduled. Abstain from alcohol or any unprescribed drugs.    Psychiatry Follow-up:     [facility]  - Angie Barrera- cell: [phone]- As arranged   [address] [place]   [address]  [place] [address]  Phone: [phone]  Fax: [phone]    [facility]  Therapy- Yanci Damon- Sept 14th @ 9am  Medication Management- Dr. Koroma- Sept 14th @ 10:20am   [address] [place] [address]    Therapy- Phone: [phone] (Fax)    Medication Management- Phone: [phone]   Fax: [phone]    [facility]   Therapy- Currently on leave for 6 months  [address] [place] [address]  Phone: [phone]      [facility]  PCP-Lety Monsivais- September 18th @ 11:30am- Waiting room A  Phone: [phone]  Fax: [phone]    Crisis Line: 1-722.219.1614    [place]   [address]   [facility] [address]    [facility]   [address] [place] [place], [address]  Phone: [phone]      Resources:   Crisis Intervention: 644.634.4870 or 937-415-7260 (TTY: 716.179.5756).  Call anytime for help.  National [place] on Mental  "Illness (www.[place].jeromy.org): 309.382.6861 or 557-869-7002.  Suicide Awareness Voices of Education (SAVE) (www.save.org): 798-289-FGUU (7721)  National Suicide Prevention Line (www.mentalhealthmn.org): 464-717-PKUG (3929)  Mental Health Consumer/Survivor Network of [place] (www.mhcsn.net): 779.983.4787 or 407-671-7786  Mental Health Association of [place] (www.mentalhealth.org): 225.170.9779 or 460-013-1980    General Medication Instructions:   See your medication sheet(s) for instructions.   Take all medicines as directed.  Make no changes unless your doctor suggests them.   Go to all your doctor visits.  Be sure to have all your required lab tests. This way, your medicines can be refilled on time.  Do not use any drugs not prescribed by your doctor.  Avoid alcohol.      [place] Area:  [facility], Crisis stabilization [facility]- [phone]  [facility] Crisis Line: 1-851.669.5119  Advocates For Family Peace: 212-1149  Sexual Assault Program of [facility]: 958.179.5151 or 1-149.967.6879  [place] Battered Women's Program: 1-254.370.7108 Ext: 279       Calls answered Mon-Fri-8:00 am--4:30 pm    [place]:  Adv[facility]ates for Family Peace: 1-175.288.7538  [facility] first call for help: 9-026-559-7933  [facility] Crisis Center:  (243) 506-5840      [place] Area:  Warm Line: 1-268.991.8912       Calls answered Tuesday--Saturday 4:00 pm--10:00 pm  [facility] Crisis Line - [phone]  [place] Crisis Stabilization 490-899-4982    [place] Statewide:  [place] Crisis and Referral Services: 4-276-352-3608  National Suicide Prevention Lifeline: 5-340-533-TALK (5847)   - jwc4rdjm- Text \"Life\" to 82036  First Call for Help: 2-1-1  [facility] Helpline- 8-635-PLEN-HELP    "

## 2018-09-07 NOTE — PLAN OF CARE
Problem: Patient Care Overview  Goal: Team Discussion  Team Plan:   BEHAVIORAL TEAM DISCUSSION    Participants:  Kusum Cota NP, Toya Wolf NP, Renetta Quinteros LSW, Geno Hargrove LSW, Venus Lyons RN, Amelie Cleaning RN, Marilyn Irvin OT, Haily Fuchs OT  Progress: fair, more calm, attending some groups  Continued Stay Criteria/Rationale: recent increase in Lamictal  Medical/Physical: post bilateral mastectomy  Precautions:   Behavioral Orders   Procedures     Code 1 - Restrict to Unit     Routine Programming     As clinically indicated     Status 15     Every 15 minutes.     Plan: setting up remote PD, DC next Wednesday back home to pack then move the [facility]  Rationale for change in precautions or plan: None

## 2018-09-07 NOTE — PLAN OF CARE
Problem: Patient Care Overview  Goal: Plan of Care/Patient Progress Review  Occupational Therapy Discharge Summary    Reason for therapy discharge:    All goals and outcomes met, no further needs identified.    Progress towards therapy goal(s). See goals on Care Plan in [facility] electronic health record for goal details.  Goals met    Therapy recommendation(s):    Continue home exercise program.

## 2018-09-07 NOTE — PLAN OF CARE
Face to face end of shift report received from Brittny MEDRANO. Rounding completed. Patient observed.     Anige Savage  9/6/2018  11:27 PM

## 2018-09-07 NOTE — PLAN OF CARE
"Problem: Patient Care Overview  Goal: Individualization & Mutuality  Patient will be compliant with treatment team recommendations.  Patient will report at least 6-8 hours of sleep each night.    8/21/18 Patient may keep wet wipes in her bathroom per patient care order for proper hygiene after chemo treatments.  Patient will attend groups.   Patient is unable to have a second scrub jacket as patient has been using jackets inappropriately.    Outcome: Improving  Pt is up in the [facility] when nurse arrives to this shift. Pt states her goal is to have a better day then yesterday, explaining how her afternoon \"fell apart\" after her great day on day shift. Pt states she feels like it was due to being tired yesterday and she is going to try to be more in tune with her body and take naps when she is tired. Pt is social with peers in [facility]. Pt is cooperative with nursing assessment, she is compliant with medications. She does attend groups.     Problem: Coping, Compromised Individual (Adult,Obstetrics,Pediatric)  Goal: Effective Coping  Patient will apply her coping skills, actively deal with grief issues, and talk with nursing staff about her issues in an appropriate manner.   Outcome: Improving  Pt does report using coping skills pt does use the Alpha stim during group.     Pt continues to attend groups, she does report to nurse that she feels like she is speeding up and would like something for anxiety so she can continue to enjoy groups. Pt is reminded that if she was feeling irritable she was going to try laying down today, pt becomes offended and states \"I am not tired, I know my body\" pt then requests to have Ativan.  Pt is administered Ativan 1.5 mg for anxiety as well as Alpha Stim.   Pt complains of nausea and asks for ginger ale. I have called down to request this from Kitchen. Waiting on kitchen.  Pt continues to attend group.  "

## 2018-09-07 NOTE — PLAN OF CARE
Problem: Patient Care Overview  Goal: Discharge Needs Assessment  Outcome: Improving  Received email from pt's JORDON Adams stating a member from pt's [facility] is willing to transport her early next week. Sent email to Cuong Maya with [employer] to arrange remote provisional discharge.     Pt's JORDON Adams states she will arrange for law enforcement to transport pt on Wednesday back home to [place].     Spoke with pt and informed her she will be discharging on Wednesday- Staff will update her on  time. Also informed her staff is setting up a remote PD with Cuong from [facility] prior to discharge. Pt asks that her medications be sent with her.     Sent updated MAR, notes, and commitment paper work to [facility]- along with follow up apts to Cuong with [employer]

## 2018-09-07 NOTE — PLAN OF CARE
Problem: Patient Care Overview  Goal: Individualization & Mutuality  Patient will be compliant with treatment team recommendations.  Patient will report at least 6-8 hours of sleep each night.    8/21/18 Patient may keep wet wipes in her bathroom per patient care order for proper hygiene after chemo treatments.  Patient will attend groups.   Patient is unable to have a second scrub jacket as patient has been using jackets inappropriately.    Pt is pleasant and cooperative with writer.  She states she is having a good day.  Dose admit to ongoing anxiety.  States she is trying to use her coping skill.  Did use PRN alpha Stim. Has been attending groups and has been social with peers. Pt was cooperative with all medications this shift.  Pt requested PRN ativan 1.5mg at 2118 for anxiety.  Pt also stated she felt paranoid at this time.  Pt went to bed short after.      Problem: Coping, Compromised Individual (Adult,Obstetrics,Pediatric)  Goal: Effective Coping  Patient will apply her coping skills, actively deal with grief issues, and talk with nursing staff about her issues in an appropriate manner.   Pt has been able to cope appopiratly this shift.

## 2018-09-07 NOTE — PLAN OF CARE
Face to face end of shift report received from Alyssia ABAD RN. Rounding completed. Patient observed in group.     Britni Thomas  9/7/2018  3:50 PM

## 2018-09-07 NOTE — PLAN OF CARE
Face to face end of shift report received from Angie ROMAN RN. Rounding completed. Patient observed in [facility].     Mary Velasquez  9/7/2018  7:36 AM

## 2018-09-07 NOTE — PROGRESS NOTES
09/07/18 0845   Intervention (Other)   Intervention (Other) Minutes of Treatment 20   Symptoms Noted During/After Treatment none   Intervention (Other) Detail Went over manual lymphedema massage. Pt. able to independently remember the routine without referring back to pictoral and written cues. OT to DC.    Additional Documentation   OT Plan OT to DC.    Total Session Time   Total Session Time (minutes) 20 minutes

## 2018-09-08 PROCEDURE — 25000132 ZZH RX MED GY IP 250 OP 250 PS 637: Mod: GY | Performed by: NURSE PRACTITIONER

## 2018-09-08 PROCEDURE — A9270 NON-COVERED ITEM OR SERVICE: HCPCS | Mod: GY | Performed by: NURSE PRACTITIONER

## 2018-09-08 PROCEDURE — 99232 SBSQ HOSP IP/OBS MODERATE 35: CPT | Performed by: NURSE PRACTITIONER

## 2018-09-08 PROCEDURE — 12400011

## 2018-09-08 RX ORDER — TOPIRAMATE 25 MG/1
25 TABLET, FILM COATED ORAL EVERY 12 HOURS
Qty: 60 TABLET | Refills: 0 | Status: SHIPPED | OUTPATIENT
Start: 2018-09-08

## 2018-09-08 RX ORDER — GABAPENTIN 300 MG/1
600 CAPSULE ORAL 4 TIMES DAILY
Qty: 240 CAPSULE | Refills: 0 | Status: SHIPPED | OUTPATIENT
Start: 2018-09-08

## 2018-09-08 RX ORDER — LAMOTRIGINE 200 MG/1
200 TABLET ORAL 2 TIMES DAILY
Qty: 60 TABLET | Refills: 0 | Status: SHIPPED | OUTPATIENT
Start: 2018-09-08

## 2018-09-08 RX ORDER — AMANTADINE HYDROCHLORIDE 100 MG/1
100 CAPSULE, GELATIN COATED ORAL 2 TIMES DAILY
Qty: 60 CAPSULE | Refills: 0 | Status: SHIPPED | OUTPATIENT
Start: 2018-09-08

## 2018-09-08 RX ORDER — LORAZEPAM 1 MG/1
2 TABLET ORAL 3 TIMES DAILY PRN
Qty: 60 TABLET | Refills: 0 | Status: SHIPPED | OUTPATIENT
Start: 2018-09-08

## 2018-09-08 RX ORDER — CLONIDINE HYDROCHLORIDE 0.1 MG/1
0.1 TABLET ORAL 3 TIMES DAILY
Qty: 90 TABLET | Refills: 0 | Status: SHIPPED | OUTPATIENT
Start: 2018-09-08

## 2018-09-08 RX ORDER — RISPERIDONE 1 MG/1
1 TABLET, ORALLY DISINTEGRATING ORAL 3 TIMES DAILY PRN
Qty: 90 TABLET | Refills: 0 | Status: SHIPPED | OUTPATIENT
Start: 2018-09-08

## 2018-09-08 RX ORDER — TOPIRAMATE 25 MG/1
25 TABLET, FILM COATED ORAL EVERY 12 HOURS SCHEDULED
Status: DISCONTINUED | OUTPATIENT
Start: 2018-09-08 | End: 2018-09-12 | Stop reason: HOSPADM

## 2018-09-08 RX ORDER — HYDROXYZINE PAMOATE 50 MG/1
50-100 CAPSULE ORAL 3 TIMES DAILY PRN
Qty: 120 CAPSULE | Refills: 0 | Status: SHIPPED | OUTPATIENT
Start: 2018-09-08

## 2018-09-08 RX ADMIN — LAMOTRIGINE 175 MG: 25 TABLET ORAL at 20:14

## 2018-09-08 RX ADMIN — GABAPENTIN 600 MG: 300 CAPSULE ORAL at 08:32

## 2018-09-08 RX ADMIN — LORAZEPAM 1.5 MG: 0.5 TABLET ORAL at 08:39

## 2018-09-08 RX ADMIN — NICOTINE POLACRILEX 4 MG: 2 GUM, CHEWING ORAL at 17:32

## 2018-09-08 RX ADMIN — NICOTINE POLACRILEX 4 MG: 2 GUM, CHEWING ORAL at 16:17

## 2018-09-08 RX ADMIN — NICOTINE POLACRILEX 4 MG: 2 GUM, CHEWING ORAL at 08:36

## 2018-09-08 RX ADMIN — LORAZEPAM 1.5 MG: 0.5 TABLET ORAL at 17:24

## 2018-09-08 RX ADMIN — VITAMIN A, VITAMIN C, VITAMIN D-3, VITAMIN E, VITAMIN B-1, VITAMIN B-2, NIACIN, VITAMIN B-6, CALCIUM, IRON, ZINC, COPPER 1 TABLET: 4000; 120; 400; 22; 1.84; 3; 20; 10; 1; 12; 200; 27; 25; 2 TABLET ORAL at 08:30

## 2018-09-08 RX ADMIN — RISPERIDONE 1 MG: 1 TABLET, ORALLY DISINTEGRATING ORAL at 00:35

## 2018-09-08 RX ADMIN — Medication 1 G: at 08:32

## 2018-09-08 RX ADMIN — GABAPENTIN 600 MG: 300 CAPSULE ORAL at 16:17

## 2018-09-08 RX ADMIN — LAMOTRIGINE 175 MG: 25 TABLET ORAL at 08:31

## 2018-09-08 RX ADMIN — TOPIRAMATE 25 MG: 25 TABLET, FILM COATED ORAL at 11:43

## 2018-09-08 RX ADMIN — CELECOXIB 200 MG: 100 CAPSULE ORAL at 20:14

## 2018-09-08 RX ADMIN — TOPIRAMATE 25 MG: 25 TABLET, FILM COATED ORAL at 20:13

## 2018-09-08 RX ADMIN — CLONIDINE HYDROCHLORIDE 0.1 MG: 0.1 TABLET ORAL at 08:31

## 2018-09-08 RX ADMIN — NICOTINE 1 PATCH: 21 PATCH, EXTENDED RELEASE TRANSDERMAL at 08:32

## 2018-09-08 RX ADMIN — CLONIDINE HYDROCHLORIDE 0.1 MG: 0.1 TABLET ORAL at 13:00

## 2018-09-08 RX ADMIN — CYANOCOBALAMIN TAB 1000 MCG 1000 MCG: 1000 TAB at 08:32

## 2018-09-08 RX ADMIN — CELECOXIB 200 MG: 100 CAPSULE ORAL at 08:31

## 2018-09-08 RX ADMIN — Medication 25 MG: at 08:30

## 2018-09-08 RX ADMIN — HYDROXYZINE PAMOATE 100 MG: 50 CAPSULE ORAL at 11:46

## 2018-09-08 RX ADMIN — RISPERIDONE 1 MG: 1 TABLET, ORALLY DISINTEGRATING ORAL at 11:46

## 2018-09-08 RX ADMIN — AMANTADINE 100 MG: 100 CAPSULE ORAL at 08:32

## 2018-09-08 RX ADMIN — AMANTADINE 100 MG: 100 CAPSULE ORAL at 20:14

## 2018-09-08 RX ADMIN — Medication 1 G: at 20:14

## 2018-09-08 RX ADMIN — Medication 250 MG: at 08:31

## 2018-09-08 RX ADMIN — LORATADINE 10 MG: 10 TABLET ORAL at 08:32

## 2018-09-08 RX ADMIN — NICOTINE POLACRILEX 4 MG: 2 GUM, CHEWING ORAL at 14:37

## 2018-09-08 RX ADMIN — RANITIDINE 150 MG: 150 TABLET ORAL at 20:13

## 2018-09-08 RX ADMIN — RANITIDINE 150 MG: 150 TABLET ORAL at 08:30

## 2018-09-08 RX ADMIN — NICOTINE POLACRILEX 4 MG: 2 GUM, CHEWING ORAL at 00:36

## 2018-09-08 RX ADMIN — CLONIDINE HYDROCHLORIDE 0.1 MG: 0.1 TABLET ORAL at 20:14

## 2018-09-08 RX ADMIN — NICOTINE POLACRILEX 4 MG: 2 GUM, CHEWING ORAL at 23:34

## 2018-09-08 RX ADMIN — MAGNESIUM HYDROXIDE 30 ML: 400 SUSPENSION ORAL at 17:35

## 2018-09-08 RX ADMIN — VITAMIN D, TAB 1000IU (100/BT) 2000 UNITS: 25 TAB at 08:30

## 2018-09-08 RX ADMIN — Medication 250 MG: at 20:14

## 2018-09-08 RX ADMIN — GABAPENTIN 600 MG: 300 CAPSULE ORAL at 12:59

## 2018-09-08 RX ADMIN — GABAPENTIN 600 MG: 300 CAPSULE ORAL at 20:14

## 2018-09-08 ASSESSMENT — ACTIVITIES OF DAILY LIVING (ADL)
ORAL_HYGIENE: INDEPENDENT
GROOMING: INDEPENDENT
LAUNDRY: UNABLE TO COMPLETE
GROOMING: INDEPENDENT
DRESS: SCRUBS (BEHAVIORAL HEALTH)
ORAL_HYGIENE: INDEPENDENT
LAUNDRY: UNABLE TO COMPLETE
DRESS: INDEPENDENT;SCRUBS (BEHAVIORAL HEALTH)

## 2018-09-08 NOTE — PLAN OF CARE
Face to face end of shift report received from MITCHELL Ryder . Rounding completed. Patient observed.lying in a supine position - eyes closed - non-labored breathing noted.     Ny Driver  9/8/2018  12:45 AM

## 2018-09-08 NOTE — PLAN OF CARE
"Problem: Patient Care Overview  Goal: Individualization & Mutuality  Patient will be compliant with treatment team recommendations.  Patient will report at least 6-8 hours of sleep each night.    8/21/18 Patient may keep wet wipes in her bathroom per patient care order for proper hygiene after chemo treatments.  Patient will attend groups.   Patient is unable to have a second scrub jacket as patient has been using jackets inappropriately.    Outcome: Improving  Slept for the majority of the night with the exception of being awake for approx 2 hours - did ask for and receive risperdal 1 mg po for c/o anxiety - at 0045 - [facility] of staff - pt then came to nursing station as asked for something for  \"feeling bad\" apparently she said something to a staff member r/t their wt. And felt bad about it - was asking for a \"pill\" to make her feel better - advised to use coping skills which she did - continued to do alpha stem - did ask for mandellas to color and was given them with emphasis on the her favorites - advised to fall back on her Hinduism training - two of her family members are reverends or priests  She is currently reading and writing down favorite quotes from the bible.has been pleasant, polite and appropriate to staff and other pts.       "

## 2018-09-08 NOTE — PROGRESS NOTES
[facility]  Psychiatric Progress Note    Subjective     Marti remains in good spirits with some good insight. She tells nme she knows she cannot use stimulants because they do cause anxiety and cause her to lose control. She does sy initially sh she likes the high from them. She talks about knowing to expect the ups and downs in her mood but trying to stay focused on the positives. She is rather insightful at this time. She is hoping her seasonal low is not too bad but she is prepared for it as winter is coming.         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          Medications:       amantadine  100 mg Oral BID     celecoxib  200 mg Oral BID     cholecalciferol  2,000 Units Oral Daily     cloNIDine  0.1 mg Oral TID     cyanocobalamin  1,000 mcg Oral Daily     fish oil-omega-3 fatty acids  1 g Oral BID     gabapentin  600 mg Oral 4x Daily     lamoTRIgine  175 mg Oral BID     loratadine  10 mg Oral Daily     nicotine  1 patch Transdermal Daily     nicotine   Transdermal Q8H     nicotine   Transdermal Daily     PNV PRENATAL PLUS MULTIVITAMIN  1 tablet Oral Daily     pyridOXINE  25 mg Oral Daily     ranitidine  150 mg Oral BID     risperiDONE microspheres  50 mg Intramuscular Q14 Days     saccharomyces boulardii  250 mg Oral BID     topiramate  25 mg Oral Q12H MICHAEL     acetaminophen, alum & mag hydroxide-simethicone, amantadine, diphenhydrAMINE, gabapentin, hydrOXYzine (VISTARIL) capsule  mg, LORazepam, magnesium hydroxide, mineral  "oil-hydrophilic petrolatum, nicotine polacrilex, risperiDONE, selenium sulfide          Allergies:     Allergies   Allergen Reactions     Azithromycin Anaphylaxis     Abilify [Aripiprazole] Unknown     Wellbutrin [Bupropion] Other (See Comments)     suicidal     Zithromax [Azithromycin Dihydrate]           Psychiatric Examination:   /94  Pulse 117  Temp 98.6  F (37  C) (Tympanic)  Resp 18  Ht 1.651 m (5' 5\")  Wt 99.9 kg (220 lb 3.2 oz)  SpO2 96%  BMI 36.64 kg/m2  Weight is 220 lbs 3.2 oz  Body mass index is 36.64 kg/(m^2).    Appearance:  awake, alert, adequately groomed and dressed in hospital scrubs  Attitude:  cooperative  Eye Contact:  good  Mood:  irritable  Affect:  mood congruent, intensity is heightened and intensity is dramatic  Speech:  clear, coherent   Psychomotor Behavior:  no evidence of tardive dyskinesia, dystonia, or tics and intact station, gait and muscle tone  Thought Process:  Less disorganized  Associations:  Morris are less  Thought Content:  no evidence of suicidal ideation or homicidal ideation  Insight:  improving  Judgment:  fair  Oriented to:  time, person, and place  Attention Span and Concentration:  fair  Recent and Remote Memory:  fair  Fund of Knowledge: appropriate  Muscle Strength and Tone: normal  Gait and Station: Normal  Perception: no perceptual disturbance noted         Labs:     No results found for this or any previous visit (from the past 24 hour(s)).      Assessment/ Plan:   Start topamax 25 mg twice a day    ELOS:  >5 days due to completion of cancer infusions on 8/30/18 - next treatment Tuesday 9/4  Discharge to safe, supportive environment.   Recommend close to [facility] so can continue Cancer treatments along with support groups for low anxiety.  "

## 2018-09-08 NOTE — PLAN OF CARE
Face to face end of shift report received from Renay MCGOWAN RN. Rounding completed. Patient observed in [facility].     Britni Thomas  9/8/2018  3:43 PM

## 2018-09-08 NOTE — PLAN OF CARE
"Problem: Patient Care Overview  Goal: Individualization & Mutuality  Patient will be compliant with treatment team recommendations.  Patient will report at least 6-8 hours of sleep each night.    8/21/18 Patient may keep wet wipes in her bathroom per patient care order for proper hygiene after chemo treatments.  Patient will attend groups.   Patient is unable to have a second scrub jacket as patient has been using jackets inappropriately.    Outcome: No Change  Patient denies SI, HI, monsalve., pain. She contracts for safety. She admits to high anxiety and depression. She was somewhat irritable, per other staff, because she was unable to get alpha stim and george gum right away. Patient asked for ativan PRN, this writer asked her if she felt she could utilize coping skills, \"Well I asked for the alpha stim before the anxiety started and no one got it for me so now I'm asking for ativan\". She was given 1.5 mg of ativan PO. Patient is usually pleasant and cooperative but becomes irritable at times. She was able to use alpha stim in the morning group. She was compliant with medications and assessments.   1330: Patient requested, and received, PRN vistaril 100 mg and risperdal 1 mg at 1146 for c/o anxiety. She was initially irritable when this writer encouraged her to try using coping skills. Patient does state that she has been journaling, listening to music, and trying to modify her emotions. She has used alpha stim x2 this shift.    Problem: Coping, Compromised Individual (Adult,Obstetrics,Pediatric)  Goal: Effective Coping  Patient will apply her coping skills, actively deal with grief issues, and talk with nursing staff about her issues in an appropriate manner.   Outcome: Therapy, progress toward functional goals is gradual  Patient was coloring and listening to music in the lounge area but she requests PRN medications when she is anxious.       "

## 2018-09-08 NOTE — PLAN OF CARE
Problem: Coping, Compromised Individual (Adult,Obstetrics,Pediatric)  Goal: Effective Coping  Patient will apply her coping skills, actively deal with grief issues, and talk with nursing staff about her issues in an appropriate manner.   Outcome: Improving  No grief issues discussed - please see patient care overview for excellent coping skills demonstrated without the use of medications.

## 2018-09-08 NOTE — PLAN OF CARE
Face to face end of shift report received from Ny BALL RN. Rounding completed. Patient observed.     Renay Ruff  9/8/2018  9:16 AM

## 2018-09-08 NOTE — PLAN OF CARE
Problem: Patient Care Overview  Goal: Individualization & Mutuality  Patient will be compliant with treatment team recommendations.  Patient will report at least 6-8 hours of sleep each night.    8/21/18 Patient may keep wet wipes in her bathroom per patient care order for proper hygiene after chemo treatments.  Patient will attend groups.   Patient is unable to have a second scrub jacket as patient has been using jackets inappropriately.    Pt is cooperative with writer.  States she has high anxiety today and some depression.  Denies SI, HI, pain, and halluciantions.  Has been napping on and off this afternoon.  Did try coloring, listening to music,  PRN alpha stim, and essential oil to help with anxiety.   Pt requested PRN Ativan 1.5mg at 1724. Pt later reported effective. She has been cooperative with all medications this shift.  Did attend groups.      Problem: Coping, Compromised Individual (Adult,Obstetrics,Pediatric)  Goal: Effective Coping  Patient will apply her coping skills, actively deal with grief issues, and talk with nursing staff about her issues in an appropriate manner.   Pt used her coping skills appropriately, see note above.

## 2018-09-09 PROCEDURE — A9270 NON-COVERED ITEM OR SERVICE: HCPCS | Mod: GY | Performed by: NURSE PRACTITIONER

## 2018-09-09 PROCEDURE — 25000132 ZZH RX MED GY IP 250 OP 250 PS 637: Mod: GY | Performed by: NURSE PRACTITIONER

## 2018-09-09 PROCEDURE — 12400011

## 2018-09-09 RX ADMIN — LORATADINE 10 MG: 10 TABLET ORAL at 08:34

## 2018-09-09 RX ADMIN — CELECOXIB 200 MG: 100 CAPSULE ORAL at 08:35

## 2018-09-09 RX ADMIN — VITAMIN A, VITAMIN C, VITAMIN D-3, VITAMIN E, VITAMIN B-1, VITAMIN B-2, NIACIN, VITAMIN B-6, CALCIUM, IRON, ZINC, COPPER 1 TABLET: 4000; 120; 400; 22; 1.84; 3; 20; 10; 1; 12; 200; 27; 25; 2 TABLET ORAL at 08:32

## 2018-09-09 RX ADMIN — CYANOCOBALAMIN TAB 1000 MCG 1000 MCG: 1000 TAB at 08:33

## 2018-09-09 RX ADMIN — TOPIRAMATE 25 MG: 25 TABLET, FILM COATED ORAL at 08:34

## 2018-09-09 RX ADMIN — GABAPENTIN 600 MG: 300 CAPSULE ORAL at 08:33

## 2018-09-09 RX ADMIN — LAMOTRIGINE 175 MG: 25 TABLET ORAL at 08:30

## 2018-09-09 RX ADMIN — GABAPENTIN 600 MG: 300 CAPSULE ORAL at 11:54

## 2018-09-09 RX ADMIN — NICOTINE POLACRILEX 4 MG: 2 GUM, CHEWING ORAL at 20:07

## 2018-09-09 RX ADMIN — HYDROXYZINE PAMOATE 100 MG: 50 CAPSULE ORAL at 11:54

## 2018-09-09 RX ADMIN — CLONIDINE HYDROCHLORIDE 0.1 MG: 0.1 TABLET ORAL at 14:20

## 2018-09-09 RX ADMIN — GABAPENTIN 600 MG: 300 CAPSULE ORAL at 17:46

## 2018-09-09 RX ADMIN — VITAMIN D, TAB 1000IU (100/BT) 2000 UNITS: 25 TAB at 08:34

## 2018-09-09 RX ADMIN — NICOTINE POLACRILEX 4 MG: 2 GUM, CHEWING ORAL at 11:55

## 2018-09-09 RX ADMIN — Medication 250 MG: at 20:04

## 2018-09-09 RX ADMIN — Medication 1 G: at 20:03

## 2018-09-09 RX ADMIN — GABAPENTIN 600 MG: 300 CAPSULE ORAL at 20:03

## 2018-09-09 RX ADMIN — GABAPENTIN 300 MG: 300 CAPSULE ORAL at 19:00

## 2018-09-09 RX ADMIN — NICOTINE 1 PATCH: 21 PATCH, EXTENDED RELEASE TRANSDERMAL at 08:45

## 2018-09-09 RX ADMIN — CLONIDINE HYDROCHLORIDE 0.1 MG: 0.1 TABLET ORAL at 08:34

## 2018-09-09 RX ADMIN — LORAZEPAM 1.5 MG: 0.5 TABLET ORAL at 14:24

## 2018-09-09 RX ADMIN — NICOTINE POLACRILEX 4 MG: 2 GUM, CHEWING ORAL at 08:43

## 2018-09-09 RX ADMIN — LAMOTRIGINE 175 MG: 25 TABLET ORAL at 20:03

## 2018-09-09 RX ADMIN — NICOTINE POLACRILEX 4 MG: 2 GUM, CHEWING ORAL at 17:46

## 2018-09-09 RX ADMIN — LORAZEPAM 1.5 MG: 0.5 TABLET ORAL at 08:43

## 2018-09-09 RX ADMIN — Medication 1 G: at 08:35

## 2018-09-09 RX ADMIN — NICOTINE POLACRILEX 4 MG: 2 GUM, CHEWING ORAL at 10:23

## 2018-09-09 RX ADMIN — RANITIDINE 150 MG: 150 TABLET ORAL at 20:03

## 2018-09-09 RX ADMIN — Medication 25 MG: at 08:34

## 2018-09-09 RX ADMIN — CLONIDINE HYDROCHLORIDE 0.1 MG: 0.1 TABLET ORAL at 20:03

## 2018-09-09 RX ADMIN — NICOTINE POLACRILEX 4 MG: 2 GUM, CHEWING ORAL at 19:00

## 2018-09-09 RX ADMIN — CELECOXIB 200 MG: 100 CAPSULE ORAL at 20:03

## 2018-09-09 RX ADMIN — AMANTADINE 100 MG: 100 CAPSULE ORAL at 08:35

## 2018-09-09 RX ADMIN — RANITIDINE 150 MG: 150 TABLET ORAL at 08:34

## 2018-09-09 RX ADMIN — Medication 250 MG: at 08:35

## 2018-09-09 RX ADMIN — TOPIRAMATE 25 MG: 25 TABLET, FILM COATED ORAL at 20:04

## 2018-09-09 RX ADMIN — AMANTADINE 100 MG: 100 CAPSULE ORAL at 20:04

## 2018-09-09 RX ADMIN — NICOTINE POLACRILEX 4 MG: 2 GUM, CHEWING ORAL at 05:19

## 2018-09-09 ASSESSMENT — ACTIVITIES OF DAILY LIVING (ADL)
LAUNDRY: UNABLE TO COMPLETE
ORAL_HYGIENE: INDEPENDENT
DRESS: INDEPENDENT
GROOMING: INDEPENDENT
GROOMING: INDEPENDENT
DRESS: INDEPENDENT;SCRUBS (BEHAVIORAL HEALTH)
LAUNDRY: UNABLE TO COMPLETE
ORAL_HYGIENE: INDEPENDENT

## 2018-09-09 NOTE — PLAN OF CARE
Problem: Patient Care Overview  Goal: Individualization & Mutuality  Patient will be compliant with treatment team recommendations.  Patient will report at least 6-8 hours of sleep each night.    8/21/18 Patient may keep wet wipes in her bathroom per patient care order for proper hygiene after chemo treatments.  Patient will attend groups.   Patient is unable to have a second scrub jacket as patient has been using jackets inappropriately.    Outcome: Improving  Slept her average of 6 to 7 hours - does use time up early to her advantage - took shower - had coffee and is listening to the radio and copying inspirationlal quotes from booklet she has - also appropriately visiting with male and female peers.

## 2018-09-09 NOTE — PLAN OF CARE
This writer faxed Ativan prescription to [facility] pharmacy in [place]. Hard copy and fax confirmation placed in front of chart.

## 2018-09-09 NOTE — PLAN OF CARE
Face to face end of shift report received from Anayeli CASIANO RN. Rounding completed. Patient observed awake in room.     Britni Thomas  9/9/2018  3:49 PM

## 2018-09-09 NOTE — PLAN OF CARE
"Problem: Patient Care Overview  Goal: Individualization & Mutuality  Patient will be compliant with treatment team recommendations.  Patient will report at least 6-8 hours of sleep each night.    8/21/18 Patient may keep wet wipes in her bathroom per patient care order for proper hygiene after chemo treatments.  Patient will attend groups.   Patient is unable to have a second scrub jacket as patient has been using jackets inappropriately.    Outcome: Declining  0830 pt very demanding for am meds to be given immediately   Name calling staff and initially wanting a different nurse then proceeded to do the same with other staff Also requested and given alpha stim to use in lobby area  Accusing staff of waiting on others and not on her  Pt was given ativan 1.5 mg upon request for c/o anxiety at 0843  Shortly after taking ativan apologized for being \"crabby\"  States I tend to have irritability at meal times when I have to come out by other people  Continues however to want to be waited on every few minutes  Very needy  1145 hydroxazine 100 mg given for continued c/o anxiety  Pt appears calmer but insists that needs ativan 1.5 mg so med was given      "

## 2018-09-09 NOTE — PLAN OF CARE
Face to face end of shift report received from MITCHELL Ryder . Rounding completed. Patient observed. Is in the lounge area coloring pictures - did have staff do pictures off the computer of [employer] - is also having a snack of sandwich - juice - and potato chips.  Is in good spirits - talks about going home and what she would like to do in the future. Also had george gum.    Ny Driver  9/9/2018  1:35 AM        ]\

## 2018-09-10 PROCEDURE — 12400011

## 2018-09-10 PROCEDURE — A9270 NON-COVERED ITEM OR SERVICE: HCPCS | Mod: GY | Performed by: NURSE PRACTITIONER

## 2018-09-10 PROCEDURE — 99232 SBSQ HOSP IP/OBS MODERATE 35: CPT | Performed by: NURSE PRACTITIONER

## 2018-09-10 PROCEDURE — 25000132 ZZH RX MED GY IP 250 OP 250 PS 637: Mod: GY | Performed by: NURSE PRACTITIONER

## 2018-09-10 RX ADMIN — GABAPENTIN 600 MG: 300 CAPSULE ORAL at 08:05

## 2018-09-10 RX ADMIN — Medication 1 G: at 08:06

## 2018-09-10 RX ADMIN — LAMOTRIGINE 175 MG: 25 TABLET ORAL at 20:42

## 2018-09-10 RX ADMIN — Medication 25 MG: at 08:05

## 2018-09-10 RX ADMIN — NICOTINE POLACRILEX 4 MG: 2 GUM, CHEWING ORAL at 12:15

## 2018-09-10 RX ADMIN — LORATADINE 10 MG: 10 TABLET ORAL at 08:05

## 2018-09-10 RX ADMIN — CELECOXIB 200 MG: 100 CAPSULE ORAL at 20:43

## 2018-09-10 RX ADMIN — TOPIRAMATE 25 MG: 25 TABLET, FILM COATED ORAL at 08:05

## 2018-09-10 RX ADMIN — AMANTADINE 100 MG: 100 CAPSULE ORAL at 08:06

## 2018-09-10 RX ADMIN — CLONIDINE HYDROCHLORIDE 0.1 MG: 0.1 TABLET ORAL at 13:31

## 2018-09-10 RX ADMIN — AMANTADINE 100 MG: 100 CAPSULE ORAL at 20:44

## 2018-09-10 RX ADMIN — NICOTINE POLACRILEX 4 MG: 2 GUM, CHEWING ORAL at 11:22

## 2018-09-10 RX ADMIN — CLONIDINE HYDROCHLORIDE 0.1 MG: 0.1 TABLET ORAL at 08:06

## 2018-09-10 RX ADMIN — LORAZEPAM 1.5 MG: 0.5 TABLET ORAL at 06:06

## 2018-09-10 RX ADMIN — ACETAMINOPHEN 650 MG: 325 TABLET, FILM COATED ORAL at 06:05

## 2018-09-10 RX ADMIN — NICOTINE POLACRILEX 4 MG: 2 GUM, CHEWING ORAL at 17:38

## 2018-09-10 RX ADMIN — RANITIDINE 150 MG: 150 TABLET ORAL at 20:42

## 2018-09-10 RX ADMIN — VITAMIN A, VITAMIN C, VITAMIN D-3, VITAMIN E, VITAMIN B-1, VITAMIN B-2, NIACIN, VITAMIN B-6, CALCIUM, IRON, ZINC, COPPER 1 TABLET: 4000; 120; 400; 22; 1.84; 3; 20; 10; 1; 12; 200; 27; 25; 2 TABLET ORAL at 08:17

## 2018-09-10 RX ADMIN — NICOTINE POLACRILEX 4 MG: 2 GUM, CHEWING ORAL at 13:31

## 2018-09-10 RX ADMIN — VITAMIN D, TAB 1000IU (100/BT) 2000 UNITS: 25 TAB at 08:05

## 2018-09-10 RX ADMIN — RISPERIDONE 1 MG: 1 TABLET, ORALLY DISINTEGRATING ORAL at 17:36

## 2018-09-10 RX ADMIN — LORAZEPAM 1.5 MG: 0.5 TABLET ORAL at 17:36

## 2018-09-10 RX ADMIN — LAMOTRIGINE 175 MG: 25 TABLET ORAL at 08:04

## 2018-09-10 RX ADMIN — Medication 1 G: at 20:44

## 2018-09-10 RX ADMIN — TOPIRAMATE 25 MG: 25 TABLET, FILM COATED ORAL at 20:44

## 2018-09-10 RX ADMIN — Medication 250 MG: at 08:05

## 2018-09-10 RX ADMIN — NICOTINE POLACRILEX 4 MG: 2 GUM, CHEWING ORAL at 04:11

## 2018-09-10 RX ADMIN — NICOTINE 1 PATCH: 21 PATCH, EXTENDED RELEASE TRANSDERMAL at 08:04

## 2018-09-10 RX ADMIN — CLONIDINE HYDROCHLORIDE 0.1 MG: 0.1 TABLET ORAL at 20:43

## 2018-09-10 RX ADMIN — NICOTINE POLACRILEX 4 MG: 2 GUM, CHEWING ORAL at 08:20

## 2018-09-10 RX ADMIN — RISPERIDONE 1 MG: 1 TABLET, ORALLY DISINTEGRATING ORAL at 12:15

## 2018-09-10 RX ADMIN — Medication 250 MG: at 20:44

## 2018-09-10 RX ADMIN — CELECOXIB 200 MG: 100 CAPSULE ORAL at 08:05

## 2018-09-10 RX ADMIN — NICOTINE POLACRILEX 4 MG: 2 GUM, CHEWING ORAL at 20:43

## 2018-09-10 RX ADMIN — RANITIDINE 150 MG: 150 TABLET ORAL at 08:05

## 2018-09-10 RX ADMIN — GABAPENTIN 600 MG: 300 CAPSULE ORAL at 12:15

## 2018-09-10 RX ADMIN — GABAPENTIN 600 MG: 300 CAPSULE ORAL at 20:42

## 2018-09-10 RX ADMIN — NICOTINE POLACRILEX 4 MG: 2 GUM, CHEWING ORAL at 06:57

## 2018-09-10 RX ADMIN — GABAPENTIN 600 MG: 300 CAPSULE ORAL at 17:07

## 2018-09-10 RX ADMIN — CYANOCOBALAMIN TAB 1000 MCG 1000 MCG: 1000 TAB at 08:06

## 2018-09-10 ASSESSMENT — ACTIVITIES OF DAILY LIVING (ADL)
ORAL_HYGIENE: INDEPENDENT
DRESS: INDEPENDENT;SCRUBS (BEHAVIORAL HEALTH)
DRESS: SCRUBS (BEHAVIORAL HEALTH);INDEPENDENT
GROOMING: INDEPENDENT
ORAL_HYGIENE: INDEPENDENT
LAUNDRY: UNABLE TO COMPLETE
LAUNDRY: UNABLE TO COMPLETE
GROOMING: INDEPENDENT

## 2018-09-10 NOTE — PLAN OF CARE
Problem: Patient Care Overview  Goal: Team Discussion  Team Plan:   BEHAVIORAL TEAM DISCUSSION    Participants: Moon Elmore NP, Kusum Cota NP, Toya Wolf NP, Renetta Quinteros LSW, Geno Hargrove LSW, April Triplett RN, Haily Fuchs OT  Progress: good, calm  Continued Stay Criteria/Rationale: DC this week, monitor for medication effectiveness  Medical/Physical: history of breast cancer, post bilateral mastectomy  Precautions:   Behavioral Orders   Procedures     Code 1 - Restrict to Unit     Routine Programming     As clinically indicated     Status 15     Every 15 minutes.     Plan: DC back home and then work on getting into [facility], remote PD tomorrow  Rationale for change in precautions or plan: None

## 2018-09-10 NOTE — PLAN OF CARE
Face to face end of shift report received from Ny CASTELLON RN. Rounding completed. [place] in [facility].     Estephania Shepard  9/10/2018  7:30 AM

## 2018-09-10 NOTE — PLAN OF CARE
"Problem: Coping, Compromised Individual (Adult,Obstetrics,Pediatric)  Goal: Effective Coping  Patient will apply her coping skills, actively deal with grief issues, and talk with nursing staff about her issues in an appropriate manner.   Outcome: Declining  Unable to control her anxiety this morning - was talking with peer and seemed enjoyable as I walked by she stated \"i want some ativan and some tylenol for my ankle\" tried to get pt to use other coping skills - she states \"im crabby this is my normal routine I want three pills\" extremely irritable when seeking meds - did apologize for behavior - states \"im sick of being here\" continues to be pleasant to peer and this writer after she received her ativan.  Also administered tylenol 650 mg po for ankle pain she rates a \"5\".      "

## 2018-09-10 NOTE — PROGRESS NOTES
[facility]  Psychiatric Progress Note    Subjective     Marti is up in her room. She is admittedly a little bit down today. She is worried about taking ativan and does not want to rely on it. She is hoping to get an Alpha stim and light box once she is discharged a she knows those work well for her. She is trying very much to reconcile her cancer diagnosis and her mental health diagnosis and not feel ashamed of them. Marti is very hopeful for therapy as she knows medications will not fix it and substances can ultimately make it worse.       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          Medications:       amantadine  100 mg Oral BID     celecoxib  200 mg Oral BID     cholecalciferol  2,000 Units Oral Daily     cloNIDine  0.1 mg Oral TID     cyanocobalamin  1,000 mcg Oral Daily     fish oil-omega-3 fatty acids  1 g Oral BID     gabapentin  600 mg Oral 4x Daily     lamoTRIgine  175 mg Oral BID     loratadine  10 mg Oral Daily     nicotine  1 patch Transdermal Daily     nicotine   Transdermal Q8H     nicotine   Transdermal Daily     PNV PRENATAL PLUS MULTIVITAMIN  1 tablet Oral Daily     pyridOXINE  25 mg Oral Daily     ranitidine  150 mg Oral BID     risperiDONE microspheres  50 mg Intramuscular Q14 Days     saccharomyces boulardii  250 mg Oral BID     topiramate  25 mg Oral Q12H MICHAEL     acetaminophen, alum & mag hydroxide-simethicone, amantadine, diphenhydrAMINE, gabapentin, hydrOXYzine (VISTARIL) capsule  mg, LORazepam, magnesium  "hydroxide, mineral oil-hydrophilic petrolatum, nicotine polacrilex, risperiDONE, selenium sulfide          Allergies:     Allergies   Allergen Reactions     Azithromycin Anaphylaxis     Abilify [Aripiprazole] Unknown     Wellbutrin [Bupropion] Other (See Comments)     suicidal     Zithromax [Azithromycin Dihydrate]           Psychiatric Examination:   /82  Pulse 107  Temp 97.6  F (36.4  C) (Tympanic)  Resp 18  Ht 1.651 m (5' 5\")  Wt 103 kg (227 lb 1.6 oz)  SpO2 96%  BMI 37.79 kg/m2  Weight is 227 lbs 1.6 oz  Body mass index is 37.79 kg/(m^2).    Appearance:  awake, alert, adequately groomed and dressed in hospital scrubs  Attitude:  cooperative  Eye Contact:  good  Mood:  irritable  Affect:  mood congruent, intensity is heightened and intensity is dramatic  Speech:  clear, coherent   Psychomotor Behavior:  no evidence of tardive dyskinesia, dystonia, or tics and intact station, gait and muscle tone  Thought Process:  Less disorganized  Associations:  Morris are less  Thought Content:  no evidence of suicidal ideation or homicidal ideation  Insight:  improving  Judgment:  fair  Oriented to:  time, person, and place  Attention Span and Concentration:  fair  Recent and Remote Memory:  fair  Fund of Knowledge: appropriate  Muscle Strength and Tone: normal  Gait and Station: Normal  Perception: no perceptual disturbance noted         Labs:     No results found for this or any previous visit (from the past 24 hour(s)).      Assessment/ Plan:   Start topamax 25 mg twice a day    ELOS:  >5 days due to completion of cancer infusions on 8/30/18 - next treatment Tuesday 9/4  Discharge to safe, supportive environment.   Recommend close to [facility] so can continue Cancer treatments along with support groups for low anxiety.  "

## 2018-09-10 NOTE — PLAN OF CARE
"Problem: Patient Care Overview  Goal: Individualization & Mutuality  Patient will be compliant with treatment team recommendations.  Patient will report at least 6-8 hours of sleep each night.    8/21/18 Patient may keep wet wipes in her bathroom per patient care order for proper hygiene after chemo treatments.  Patient will attend groups.   Patient is unable to have a second scrub jacket as patient has been using jackets inappropriately.    Outcome: Improving  Slept most of the night up at approx 0500 as usual - spoke of excitement returning home  - is also excited about moving and is accepting of being evicted - states \"first thing I'm going to do is smoke a cigarette\" advised pt to take it slow as it has been quite some time since she has smoked - she does hope someone has cleaned out her frig.  Currently walking in the monsalve and listening to head phones.      "

## 2018-09-10 NOTE — PLAN OF CARE
"Problem: Patient Care Overview  Goal: Discharge Needs Assessment  Outcome: Improving  Pt will have remote PD with [facility] from [facility] via [employer] tomorrow at 1:30 in the court room- notified security. Spoke with pt this morning and informed her of this. Pt states she had a bad morning and is now feeling better about herself. Says she has come to the realization that she was \"keeping myself psychotic.\" Talks about being a sensitive person and knows that she can't hold \"the weight of the world on her shoulders.\" Says when she is sick she feels like she is a \"so kiko\" and can solve the worlds problems. Today she says she just needs some \"quiet time\" to think. States she is going to take things day by day.     Was updated by Cuong with [facility] that the hospital is able to completed pt's PD so a remote PD is not needed. Informed pt of this and that staff will go over PD and aftercare plan with her tomorrow.   "

## 2018-09-10 NOTE — PLAN OF CARE
Face to face end of shift report received from Paul RN. Rounding completed. Patient observed in dayroom and introduced to nursing for the shift    Chuyedgar Bonillat  9/10/2018  [id number]

## 2018-09-10 NOTE — PLAN OF CARE
"Problem: Patient Care Overview  Goal: Individualization & Mutuality  Patient will be compliant with treatment team recommendations.  Patient will report at least 6-8 hours of sleep each night.    8/21/18 Patient may keep wet wipes in her bathroom per patient care order for proper hygiene after chemo treatments.  Patient will attend groups.   Patient is unable to have a second scrub jacket as patient has been using jackets inappropriately.    Outcome: No Change  Patient pleasant and cooperative with nursing assessment. Reports she had a rough morning but is trying to stay positive sand utilize her coping skills along with PRN's to maintain her behavior. States she is \"getting better about not thinking everything is about me and everyone is taking about me\". States she is just trying to \"live to be me\". Cooperative with medications and utilizing alpha stim twice this shift. Denies SI, HI, pain, and hallucinations thi shift. Agrees to contact staff if having thoughts of self harm. Reports mild depression due to being hospitalized but is hopeful to discharge this week and \"get back to life\".   Received PRN Risperidone 1 mg at 1215 for increased anxiety.   Patient has maintained appropriate behaviors this shift.     Problem: Coping, Compromised Individual (Adult,Obstetrics,Pediatric)  Goal: Effective Coping  Patient will apply her coping skills, actively deal with grief issues, and talk with nursing staff about her issues in an appropriate manner.   Outcome: No Change  Patient utilizing PRN medications along with journaling and alpha stim for increased anxiety.       "

## 2018-09-10 NOTE — PLAN OF CARE
Face to face end of shift report received from Britni MEDRANO. Rounding completed. Patient observed. [facility] then retired to bed before 2330.  Currently lying in a supine position - eyes closed - soft audible snoring.    Ny Driver  9/10/2018  1:26 AM

## 2018-09-11 PROCEDURE — A9270 NON-COVERED ITEM OR SERVICE: HCPCS | Mod: GY | Performed by: NURSE PRACTITIONER

## 2018-09-11 PROCEDURE — 25000132 ZZH RX MED GY IP 250 OP 250 PS 637: Mod: GY | Performed by: NURSE PRACTITIONER

## 2018-09-11 PROCEDURE — 12400011

## 2018-09-11 PROCEDURE — 99239 HOSP IP/OBS DSCHRG MGMT >30: CPT | Performed by: NURSE PRACTITIONER

## 2018-09-11 RX ADMIN — NICOTINE POLACRILEX 4 MG: 2 GUM, CHEWING ORAL at 05:02

## 2018-09-11 RX ADMIN — LORAZEPAM 1.5 MG: 0.5 TABLET ORAL at 18:06

## 2018-09-11 RX ADMIN — AMANTADINE 100 MG: 100 CAPSULE ORAL at 09:33

## 2018-09-11 RX ADMIN — Medication 250 MG: at 20:34

## 2018-09-11 RX ADMIN — NICOTINE POLACRILEX 4 MG: 2 GUM, CHEWING ORAL at 00:54

## 2018-09-11 RX ADMIN — RISPERIDONE 1 MG: 1 TABLET, ORALLY DISINTEGRATING ORAL at 01:53

## 2018-09-11 RX ADMIN — MAGNESIUM HYDROXIDE 30 ML: 400 SUSPENSION ORAL at 00:15

## 2018-09-11 RX ADMIN — GABAPENTIN 600 MG: 300 CAPSULE ORAL at 16:40

## 2018-09-11 RX ADMIN — CYANOCOBALAMIN TAB 1000 MCG 1000 MCG: 1000 TAB at 08:38

## 2018-09-11 RX ADMIN — NICOTINE POLACRILEX 4 MG: 2 GUM, CHEWING ORAL at 20:05

## 2018-09-11 RX ADMIN — TOPIRAMATE 25 MG: 25 TABLET, FILM COATED ORAL at 09:38

## 2018-09-11 RX ADMIN — AMANTADINE 100 MG: 100 CAPSULE ORAL at 20:34

## 2018-09-11 RX ADMIN — RISPERIDONE 1 MG: 1 TABLET, ORALLY DISINTEGRATING ORAL at 12:40

## 2018-09-11 RX ADMIN — Medication 1 G: at 20:33

## 2018-09-11 RX ADMIN — VITAMIN D, TAB 1000IU (100/BT) 2000 UNITS: 25 TAB at 09:34

## 2018-09-11 RX ADMIN — GABAPENTIN 600 MG: 300 CAPSULE ORAL at 12:40

## 2018-09-11 RX ADMIN — GABAPENTIN 600 MG: 300 CAPSULE ORAL at 08:37

## 2018-09-11 RX ADMIN — NICOTINE POLACRILEX 4 MG: 2 GUM, CHEWING ORAL at 08:10

## 2018-09-11 RX ADMIN — Medication 25 MG: at 09:37

## 2018-09-11 RX ADMIN — NICOTINE POLACRILEX 4 MG: 2 GUM, CHEWING ORAL at 10:33

## 2018-09-11 RX ADMIN — LAMOTRIGINE 175 MG: 25 TABLET ORAL at 08:38

## 2018-09-11 RX ADMIN — NICOTINE 1 PATCH: 21 PATCH, EXTENDED RELEASE TRANSDERMAL at 08:40

## 2018-09-11 RX ADMIN — RANITIDINE 150 MG: 150 TABLET ORAL at 20:34

## 2018-09-11 RX ADMIN — CELECOXIB 200 MG: 100 CAPSULE ORAL at 20:33

## 2018-09-11 RX ADMIN — RANITIDINE 150 MG: 150 TABLET ORAL at 08:36

## 2018-09-11 RX ADMIN — LORAZEPAM 1.5 MG: 0.5 TABLET ORAL at 06:07

## 2018-09-11 RX ADMIN — CLONIDINE HYDROCHLORIDE 0.1 MG: 0.1 TABLET ORAL at 13:37

## 2018-09-11 RX ADMIN — VITAMIN A, VITAMIN C, VITAMIN D-3, VITAMIN E, VITAMIN B-1, VITAMIN B-2, NIACIN, VITAMIN B-6, CALCIUM, IRON, ZINC, COPPER 1 TABLET: 4000; 120; 400; 22; 1.84; 3; 20; 10; 1; 12; 200; 27; 25; 2 TABLET ORAL at 09:37

## 2018-09-11 RX ADMIN — CELECOXIB 200 MG: 100 CAPSULE ORAL at 09:34

## 2018-09-11 RX ADMIN — NICOTINE POLACRILEX 4 MG: 2 GUM, CHEWING ORAL at 16:41

## 2018-09-11 RX ADMIN — CLONIDINE HYDROCHLORIDE 0.1 MG: 0.1 TABLET ORAL at 09:34

## 2018-09-11 RX ADMIN — CLONIDINE HYDROCHLORIDE 0.1 MG: 0.1 TABLET ORAL at 20:34

## 2018-09-11 RX ADMIN — TOPIRAMATE 25 MG: 25 TABLET, FILM COATED ORAL at 20:34

## 2018-09-11 RX ADMIN — Medication 250 MG: at 09:37

## 2018-09-11 RX ADMIN — LORATADINE 10 MG: 10 TABLET ORAL at 09:35

## 2018-09-11 RX ADMIN — Medication 1 G: at 09:35

## 2018-09-11 RX ADMIN — LAMOTRIGINE 175 MG: 25 TABLET ORAL at 20:33

## 2018-09-11 RX ADMIN — GABAPENTIN 600 MG: 300 CAPSULE ORAL at 20:33

## 2018-09-11 ASSESSMENT — ACTIVITIES OF DAILY LIVING (ADL)
ORAL_HYGIENE: INDEPENDENT
LAUNDRY: UNABLE TO COMPLETE
GROOMING: INDEPENDENT
DRESS: INDEPENDENT;SCRUBS (BEHAVIORAL HEALTH)
LAUNDRY: UNABLE TO COMPLETE
GROOMING: INDEPENDENT
ORAL_HYGIENE: INDEPENDENT
DRESS: SCRUBS (BEHAVIORAL HEALTH);INDEPENDENT

## 2018-09-11 NOTE — DISCHARGE SUMMARY
Psychiatric Discharge Summary    Marti Javier MRN# [id number]   Age: 28 year old YOB: 1990     Date of Admission:  8/6/2018  Date of Discharge:  9/12/18  Admitting Physician:  Derrick Jj MD  Discharge Physician:  Kusum Cota NP          Event Leading to Hospitalization and Hospital Stay   Marti Javier is a 28 year old never   female who is ucrrently under commitment with [facility]. She was brought to the ER by her sister fabrice friend. They had reported she has been hyper Restorationism and hypersexual. She had been asking strangers on the street to have sex with her. She is currently going through chemo for breast cancer. h has a port and gets infusions every Monday. She had a double mastectomy in April of 2018. Recent PET imaging does not indicate that it has metastasized to other areas other than local lymph node. She and I had quite a good rapport through the years I have known her. She is very short with me today. She is irritable when I meet with her though isnt appearing as angry as annoyed in attitude. She did not tell me she was currently under commitment and that her  has plans on extening it. She is extremely gaurded and tense. Will continue prn zyprexa. Likely give risperdal tomorrow.   From 8/8/18:  Did review chart and she does get chemo once weekly at [facility]. Marti reports she has only three or four more infusions left then will start radiation treatment. Marti is quite clear today. She is able to have a logical and linear conversation with me regarding her health care. She would like to get closer to her oncologist and finish the chemotherapy. Following that she does say she would be fine with having radiation treatments in [place] if she were to relocate to this area. Marti feels she is doing better today than she has been previously.   Did contact [facility] to inquire about possible transfer for additional services  including chemotherapy. They reported she is not currently receiving chemo in their system so it is not an appropriate transfer. Did contact [facility] health care and they do not provide medical psych beds so they will not accept her there. She currently gets her chemo at [facility]. Did contact Angie her , left a message to help coordinate or facilitate care for eufemia.  8/9/18  Spoke with Dr. Vaughan, Eufemia's oncologist. He is concerned about her chemotherapy and that she not miss further doses. He is willing to speak with any provider for continuity of care. He did leave his phone number as well as the number of the Infusion center. Spoke with Dr. Tillman about continuing care here and this is not likely as this is not done on an inpatient basis and there is no one here to manage the medication or care. Did call Intake for [facility], coordinating a possible transfer of care. Eufemia updated on this information. She does not want to be her e and does not understand why she cannot go to [facility]. She does not feel her PD should be revoked if she came in voluntarily.  Eufemia demonstrates little insight to her mental health wellbeing. She is however cooperative with assessment and programming. She does want ot finish her cancer treatments and does have a concern about missing them.      Dr. Vaughan reports she has three infusions left of taxol and then will begin radiation therapy.     8/10/18:Eufemia is set to meet with Dr. Be on Monday prior to continuing with chemotherapy here.    During her stay here Eufemia was able to complete her chemotherapy treatment with Dr. Araiza.   Medications: Adderall, Klonopin and Vyvanse were discontinued. A phenobarbital taper was started but it had a negative interaction with her chemotherapy protocol so it was discontinued. Ativan was restarted, due to current medical status and having a home medication nurse and numerous support services this was deemed  appropriate at this time. Risperdal Lottiea was started after starting oral risperdal. Her next injection is due on 9/18. She also takes risperdal as needed for agitation and anxiety. Lamictal was restarted and titrated up to 200 mg twice a day for mood stabilization. Topamax 25 twice a day was added for mood stabilization as well. Clonidine was added 0.1 mg three times a day as needed for anxiety. Amantadine was ordered for symptoms related to EPSE, this is 100 mg twice a day as needed.     Marti did stabilize rather well while here. She does verbalize a strong desire to find a therapist she can work well with to help learn coping skills and work through her issues. She feels that many of her substance use and anger/blame issues will not be solved with more medications but with therapy. Marti had great insight to her physical and mental health issues here. Marti also used the Alpha Stim while she was here for anxiety, she found this to be tremendously helpful and is interested in getting one at home for personal use. She will follow up with Dr. Vaughan for her cancer diagnosis. Marti also has a home medication nurse, [facility] worker and .      It is recommended that Marti be prescribbed an Alpha Stim and light box therapy by an outpatient provider.           At time of discharge, there is no evidence that patient is in immediate danger of self or others.        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  Sedative hypnotic (benzodiazapines) use disorder, moderate to severe         Labs:     Results for orders placed or performed during the hospital encounter of 08/06/18   Iron and iron binding capacity   Result Value Ref Range    Iron 77 35 - 180 ug/dL    Iron Binding Cap 325 240 - 430 ug/dL    Iron Saturation Index 24 15 - 46 %   Hepatitis C antibody    Result Value Ref Range    Hepatitis C Antibody Nonreactive NR^Nonreactive   CBC with platelets differential   Result Value Ref Range    WBC 9.5 4.0 - 11.0 10e9/L    RBC Count 4.00 3.8 - 5.2 10e12/L    Hemoglobin 11.1 (L) 11.7 - 15.7 g/dL    Hematocrit 33.0 (L) 35.0 - 47.0 %    MCV 83 78 - 100 fl    MCH 27.8 26.5 - 33.0 pg    MCHC 33.6 31.5 - 36.5 g/dL    RDW 18.1 (H) 10.0 - 15.0 %    Platelet Count 296 150 - 450 10e9/L    Diff Method Automated Method     % Neutrophils 77.1 %    % Lymphocytes 18.2 %    % Monocytes 4.1 %    % Eosinophils 0.0 %    % Basophils 0.3 %    % Immature Granulocytes 0.3 %    Nucleated RBCs 0 0 /100    Absolute Neutrophil 7.3 1.6 - 8.3 10e9/L    Absolute Lymphocytes 1.7 0.8 - 5.3 10e9/L    Absolute Monocytes 0.4 0.0 - 1.3 10e9/L    Absolute Eosinophils 0.0 0.0 - 0.7 10e9/L    Absolute Basophils 0.0 0.0 - 0.2 10e9/L    Abs Immature Granulocytes 0.0 0 - 0.4 10e9/L    Absolute Nucleated RBC 0.0    Comprehensive metabolic panel   Result Value Ref Range    Sodium 138 133 - 144 mmol/L    Potassium 4.0 3.4 - 5.3 mmol/L    Chloride 106 94 - 109 mmol/L    Carbon Dioxide 23 20 - 32 mmol/L    Anion Gap 9 3 - 14 mmol/L    Glucose 97 70 - 99 mg/dL    Urea Nitrogen 11 7 - 30 mg/dL    Creatinine 0.46 (L) 0.52 - 1.04 mg/dL    GFR Estimate >90 >60 mL/min/1.7m2    GFR Estimate If Black >90 >60 mL/min/1.7m2    Calcium 8.8 8.5 - 10.1 mg/dL    Bilirubin Total 0.4 0.2 - 1.3 mg/dL    Albumin 3.6 3.4 - 5.0 g/dL    Protein Total 7.4 6.8 - 8.8 g/dL    Alkaline Phosphatase 96 40 - 150 U/L    ALT 25 0 - 50 U/L    AST 10 0 - 45 U/L   Procalcitonin   Result Value Ref Range    Procalcitonin <0.05 ng/ml   CBC with platelets differential   Result Value Ref Range    WBC 7.9 4.0 - 11.0 10e9/L    RBC Count 4.16 3.8 - 5.2 10e12/L    Hemoglobin 11.5 (L) 11.7 - 15.7 g/dL    Hematocrit 34.6 (L) 35.0 - 47.0 %    MCV 83 78 - 100 fl    MCH 27.6 26.5 - 33.0 pg    MCHC 33.2 31.5 - 36.5 g/dL    RDW 17.6 (H) 10.0 - 15.0 %    Platelet  Count 381 150 - 450 10e9/L    Diff Method Automated Method     % Neutrophils 51.0 %    % Lymphocytes 38.7 %    % Monocytes 8.8 %    % Eosinophils 0.0 %    % Basophils 0.4 %    % Immature Granulocytes 1.1 %    Nucleated RBCs 0 0 /100    Absolute Neutrophil 4.0 1.6 - 8.3 10e9/L    Absolute Lymphocytes 3.1 0.8 - 5.3 10e9/L    Absolute Monocytes 0.7 0.0 - 1.3 10e9/L    Absolute Eosinophils 0.0 0.0 - 0.7 10e9/L    Absolute Basophils 0.0 0.0 - 0.2 10e9/L    Abs Immature Granulocytes 0.1 0 - 0.4 10e9/L    Absolute Nucleated RBC 0.0    Blood culture   Result Value Ref Range    Specimen Description Blood     Culture Micro No growth after 6 days    Fluid Culture Aerobic Bacterial   Result Value Ref Range    Specimen Description Breast aspirate Right Breast     Culture Micro (A)      Heavy growth  Streptococcus agalactiae sero group B         Susceptibility    Streptococcus agalactiae sero group b - BERNICE     AMPICILLIN <=0.25 Sensitive ug/mL     CLINDAMYCIN 4 Resistant ug/mL     LEVOFLOXACIN 1 Sensitive ug/mL     PENICILLIN <=0.12 Sensitive ug/mL     VANCOMYCIN <=0.5 Sensitive ug/mL   Gram stain   Result Value Ref Range    Specimen Description Breast aspirate Right Breast     Gram Stain Many  WBC'S seen       Gram Stain Few  Gram positive cocci   (A)                     Discharge Medications:     Current Discharge Medication List      START taking these medications    Details   amantadine (SYMMETREL) 100 MG capsule Take 1 capsule (100 mg) by mouth 2 times daily  Qty: 60 capsule, Refills: 0    Associated Diagnoses: Schizoaffective disorder, bipolar type (H)      cholecalciferol 2000 units tablet Take 2,000 Units by mouth daily  Qty: 30 tablet, Refills: 0    Associated Diagnoses: Schizoaffective disorder, bipolar type (H)      cloNIDine (CATAPRES) 0.1 MG tablet Take 1 tablet (0.1 mg) by mouth 3 times daily  Qty: 90 tablet, Refills: 0    Associated Diagnoses: Schizoaffective disorder, bipolar type (H)      LORazepam (ATIVAN) 1 MG  tablet Take 2 tablets (2 mg) by mouth 3 times daily as needed for anxiety  Qty: 60 tablet, Refills: 0    Associated Diagnoses: Schizoaffective disorder, bipolar type (H)      risperiDONE (RISPERDAL M-TABS) 1 MG ODT tab Place 1 tablet (1 mg) under the tongue 3 times daily as needed (anxiety or agitation)  Qty: 90 tablet, Refills: 0    Associated Diagnoses: Schizoaffective disorder, bipolar type (H)      topiramate (TOPAMAX) 25 MG tablet Take 1 tablet (25 mg) by mouth every 12 hours  Qty: 60 tablet, Refills: 0    Associated Diagnoses: Schizoaffective disorder, bipolar type (H)         CONTINUE these medications which have CHANGED    Details   gabapentin (NEURONTIN) 300 MG capsule Take 2 capsules (600 mg) by mouth 4 times daily  Qty: 240 capsule, Refills: 0    Associated Diagnoses: Schizoaffective disorder, bipolar type (H)      hydrOXYzine (VISTARIL) 50 MG capsule Take 1-2 capsules ( mg) by mouth 3 times daily as needed for anxiety  Qty: 120 capsule, Refills: 0    Associated Diagnoses: Schizoaffective disorder, bipolar type (H)      lamoTRIgine (LAMICTAL) 200 MG tablet Take 1 tablet (200 mg) by mouth 2 times daily  Qty: 60 tablet, Refills: 0    Associated Diagnoses: Schizoaffective disorder, bipolar type (H)      risperiDONE microspheres (RISPERDAL CONSTA) 50 MG injection Inject 2 mLs (50 mg) into the muscle every 14 days  Qty: 2 each, Refills: 0    Comments: Next due 9/18  Associated Diagnoses: Schizoaffective disorder, bipolar type (H)         CONTINUE these medications which have NOT CHANGED    Details   CELECOXIB PO Take 200 mg by mouth 2 times daily      fish oil-omega-3 fatty acids 1000 MG capsule Take 1 g by mouth 2 times daily      lidocaine-prilocaine (EMLA) cream Apply topically as needed for moderate pain      Prenatal Vit-Fe Fumarate-FA (PRENATAL MULTIVITAMIN PLUS IRON) 27-0.8 MG TABS per tablet Take 1 tablet by mouth daily      RANITIDINE HCL PO Take 150 mg by mouth 2 times daily      tolnaftate  (TINACTIN) 1 % cream Apply topically as needed for irritation      cyanocobalamin (VITAMIN  B-12) 1000 MCG tablet Take 1,000 mcg by mouth daily    Associated Diagnoses: Malignant neoplasm of upper-outer quadrant of left breast in female, estrogen receptor positive (H)      Pyridoxine HCl (VITAMIN B6 PO) Take 100 mg by mouth daily    Associated Diagnoses: Malignant neoplasm of upper-outer quadrant of left breast in female, estrogen receptor positive (H)         STOP taking these medications       loratadine (CLARITIN) 10 MG tablet Comments:   Reason for Stopping:         ATOMOXETINE HCL PO Comments:   Reason for Stopping:         CLONAZEPAM PO Comments:   Reason for Stopping:         Lisdexamfetamine Dimesylate (VYVANSE PO) Comments:   Reason for Stopping:                 Justification for dual anti-psychotic use: not applicable       Psychiatric Examination:   Appearance:  awake, alert and adequately groomed  Attitude:  cooperative  Eye Contact:  good  Mood:  good  Affect:  appropriate and in normal range and mood congruent  Speech:  clear, coherent  Psychomotor Behavior:  no evidence of tardive dyskinesia, dystonia, or tics and intact station, gait and muscle tone  Thought Process:  logical  Associations:  no loose associations  Thought Content:  no evidence of suicidal ideation or homicidal ideation, no evidence of psychotic thought, no auditory hallucinations present and no visual hallucinations present  Insight:  good  Judgment:  intact  Oriented to:  time, person, and place  Attention Span and Concentration:  intact  Recent and Remote Memory:  intact  Fund of Knowledge: low-normal  Muscle Strength and Tone: normal  Gait and Station: Normal  Perception: no perceptual disturbance       Discharge Plan:     Psychiatry Follow-up:      [facility]  - Angie Barrera- cell: [phone]- As arranged   [address]  [place] [address]  Phone: [phone]  Fax:  [phone]     [facility]  Therapy- SalvatoreYanci jurado- Sept 14th @ 9am  Medication Management- Dr. Koroma- Sept 14th @ 10:20am   [address] [place] [address]    Therapy- Phone: [phone] (Fax)    Medication Management- Phone: [phone]   Fax: [phone]     [facility]   Therapy- Currently on leave for 6 months  [address] [place] [address]  Phone: [phone]        [facility]  PCP-Lety Monsivais- September 18th @ 11:30am- Waiting room A  Phone: [phone]  Fax: [phone]     Crisis Line: [phone]     [facility]   [address]   [facility] [address]     [facility]   [address] [place], [place], [address]  Phone: [phone]    Attestation:  The patient has been seen and evaluated by me,  Kusum Cota NP         Discharge Services Provided:    90 minutes spent on discharge services, including:  Final examination of patient.  Review and discussion of Hospital stay.  Instructions for continued outpatient care/goals.  Preparation of discharge records.  Preparation of medications refills and new prescriptions.  Preparation of Applicable referral forms.

## 2018-09-11 NOTE — PLAN OF CARE
Problem: Patient Care Overview  Goal: Discharge Needs Assessment  Outcome: Improving  Spoke with pt this morning and went over after care plan and provisional discharge. Called and left message for pt's CM Angie regarding time of  transport and home med nurse.     Pt's CM Angie states she spoke with pt's home med nurse and she is aware that pt will be returning home tomorrow and will take care of setting up her medications. Angie also states the  is leaving around 7:30am and will likely be to the unit around 12pm- Informed pt's nurse

## 2018-09-11 NOTE — PLAN OF CARE
Problem: Patient Care Overview  Goal: Team Discussion  Team Plan:   BEHAVIORAL TEAM DISCUSSION    Participants: Moon Elmore NP, Kusum Cota NP, Toya Wolf NP, Renetta Quinteros LSW, Geno Hargrove LSW, April Triplett RN, aHily Fuchs OT, Vanessa Diez OT  Progress: good insight, brighter, attending groups  Continued Stay Criteria/Rationale: DC tomorrow   Medical/Physical: post bilateral mastectomy, history of breast cancer  Precautions:   Behavioral Orders   Procedures     Code 1 - Restrict to Unit     Routine Programming     As clinically indicated     Status 15     Every 15 minutes.     Plan: DC back home tomorrow, remote PD today  Rationale for change in precautions or plan: None

## 2018-09-11 NOTE — PLAN OF CARE
Face to face end of shift report received from Laura ROMANO RN. Rounding completed. Patient observed.     Rina Lunsford  9/11/2018  8:41 AM

## 2018-09-11 NOTE — PLAN OF CARE
Face to face end of shift report received from Denise RN. Rounding completed. Patient observed in room and introduced to nursing for the shift    Chuyedgar WalkerSamm  9/11/2018  0335

## 2018-09-11 NOTE — PLAN OF CARE
Problem: Patient Care Overview  Goal: Individualization & Mutuality  Patient will be compliant with treatment team recommendations.  Patient will report at least 6-8 hours of sleep each night.    8/21/18 Patient may keep wet wipes in her bathroom per patient care order for proper hygiene after chemo treatments.  Patient will attend groups.   Patient is unable to have a second scrub jacket as patient has been using jackets inappropriately.    Outcome: Improving  Pt was up at shift change, pt reports pain in her ankles 1/10 but declines any pain medication. Pt states that she has already slept 6 hours and is not tired. Pt complained of constipation at 0015 and was given 30mL of Milk of Magnesia. Pt complained of anxiety at 0153 and requested risperidone 1mg. Pt went to bed for 3 hours and was up again at 0502 and requested nicotine gum. Pt did complain of anxiety at this time and requested ativan but decided she wanted to try aromatherapy and the alpha stem instead. Pt only kept the alpha stem machine on for 3 minutes. Pt was given aromatherapy at this time. Pt complained of high anxiety again at 0600, pt requested to have ativan 1.5mg. Pt then requested a sandwich and juice. Pt states that she feels crowded and admits to intrusive thoughts such as today will be a bad day for her and that she could die. Pt also states she wishes she didn't know it were 9/11 today.

## 2018-09-11 NOTE — PLAN OF CARE
"Problem: Patient Care Overview  Goal: Individualization & Mutuality  Patient will be compliant with treatment team recommendations.  Patient will report at least 6-8 hours of sleep each night.    8/21/18 Patient may keep wet wipes in her bathroom per patient care order for proper hygiene after chemo treatments.  Patient will attend groups.   Patient is unable to have a second scrub jacket as patient has been using jackets inappropriately.    Outcome: No Change  Patient irritable with this writer this morning. She reports not sleeping well last night. Per documentation, pt slept 3 hours last night. Pt states she has much anxiety about discharging tomorrow and states, \"I am just ready to go.\" Patient declines PRN medication at this time and states, \"I just want my morning medications.\" Pt took medications this morning with no issues. Pt did come out for breakfast and laid down shortly after. She states she wants to sleep this morning. Pt denies having SI, SIB, HI, or pain. Pt endorses \"some depression.\" Pt withdrawn, does not want to talk much with this writer this morning. Pt is able to make needs known. Pt has no questions or concerns regarding plan of care at this time. Will continue to monitor and document any changes.   1349: Pt up more this afternoon and has been attending groups. Pt more pleasant and cheerful. She is looking forward to discharge tomorrow. Pt did take PRN for anxiety. At 1240, Pt received PRN risperidone 1 mg PO for anxiety. Pt did report a decrease upon reassessment.     Problem: Coping, Compromised Individual (Adult,Obstetrics,Pediatric)  Goal: Effective Coping  Patient will apply her coping skills, actively deal with grief issues, and talk with nursing staff about her issues in an appropriate manner.   Outcome: No Change  Pt does utlize coping skills. Pt uses alpha stem and music. Pt not interested in talking with writer much thus far this shift.       "

## 2018-09-11 NOTE — PLAN OF CARE
Face to face end of shift report received from Chuy MOYA RN. Rounding completed. Patient observed sitting at a table in the lounge, listening to headphones.      Laura Nascimento  9/11/2018  12:17 AM

## 2018-09-12 VITALS
WEIGHT: 227.1 LBS | HEART RATE: 98 BPM | BODY MASS INDEX: 37.84 KG/M2 | HEIGHT: 65 IN | RESPIRATION RATE: 18 BRPM | DIASTOLIC BLOOD PRESSURE: 80 MMHG | TEMPERATURE: 98 F | SYSTOLIC BLOOD PRESSURE: 136 MMHG | OXYGEN SATURATION: 98 %

## 2018-09-12 PROCEDURE — 25000132 ZZH RX MED GY IP 250 OP 250 PS 637: Mod: GY | Performed by: NURSE PRACTITIONER

## 2018-09-12 PROCEDURE — A9270 NON-COVERED ITEM OR SERVICE: HCPCS | Mod: GY | Performed by: NURSE PRACTITIONER

## 2018-09-12 RX ADMIN — GABAPENTIN 600 MG: 300 CAPSULE ORAL at 08:12

## 2018-09-12 RX ADMIN — AMANTADINE 100 MG: 100 CAPSULE ORAL at 08:20

## 2018-09-12 RX ADMIN — Medication 250 MG: at 08:22

## 2018-09-12 RX ADMIN — LORATADINE 10 MG: 10 TABLET ORAL at 08:13

## 2018-09-12 RX ADMIN — GABAPENTIN 600 MG: 300 CAPSULE ORAL at 11:30

## 2018-09-12 RX ADMIN — CLONIDINE HYDROCHLORIDE 0.1 MG: 0.1 TABLET ORAL at 08:18

## 2018-09-12 RX ADMIN — LORAZEPAM 1.5 MG: 0.5 TABLET ORAL at 11:31

## 2018-09-12 RX ADMIN — LAMOTRIGINE 175 MG: 25 TABLET ORAL at 08:14

## 2018-09-12 RX ADMIN — HYDROXYZINE PAMOATE 50 MG: 50 CAPSULE ORAL at 01:04

## 2018-09-12 RX ADMIN — RANITIDINE 150 MG: 150 TABLET ORAL at 08:13

## 2018-09-12 RX ADMIN — HYDROXYZINE PAMOATE 50 MG: 50 CAPSULE ORAL at 10:46

## 2018-09-12 RX ADMIN — CELECOXIB 200 MG: 100 CAPSULE ORAL at 08:21

## 2018-09-12 RX ADMIN — NICOTINE POLACRILEX 4 MG: 2 GUM, CHEWING ORAL at 10:46

## 2018-09-12 RX ADMIN — VITAMIN D, TAB 1000IU (100/BT) 2000 UNITS: 25 TAB at 08:11

## 2018-09-12 RX ADMIN — ALUMINUM HYDROXIDE, MAGNESIUM HYDROXIDE, AND DIMETHICONE 30 ML: 400; 400; 40 SUSPENSION ORAL at 05:32

## 2018-09-12 RX ADMIN — CYANOCOBALAMIN TAB 1000 MCG 1000 MCG: 1000 TAB at 08:21

## 2018-09-12 RX ADMIN — NICOTINE POLACRILEX 4 MG: 2 GUM, CHEWING ORAL at 00:07

## 2018-09-12 RX ADMIN — NICOTINE POLACRILEX 4 MG: 2 GUM, CHEWING ORAL at 04:46

## 2018-09-12 RX ADMIN — Medication 25 MG: at 08:13

## 2018-09-12 RX ADMIN — RISPERIDONE 1 MG: 1 TABLET, ORALLY DISINTEGRATING ORAL at 01:04

## 2018-09-12 RX ADMIN — RISPERIDONE 1 MG: 1 TABLET, ORALLY DISINTEGRATING ORAL at 11:30

## 2018-09-12 RX ADMIN — TOPIRAMATE 25 MG: 25 TABLET, FILM COATED ORAL at 08:21

## 2018-09-12 RX ADMIN — Medication 1 G: at 08:20

## 2018-09-12 RX ADMIN — NICOTINE POLACRILEX 4 MG: 2 GUM, CHEWING ORAL at 08:37

## 2018-09-12 RX ADMIN — NICOTINE 1 PATCH: 21 PATCH, EXTENDED RELEASE TRANSDERMAL at 08:26

## 2018-09-12 RX ADMIN — VITAMIN A, VITAMIN C, VITAMIN D-3, VITAMIN E, VITAMIN B-1, VITAMIN B-2, NIACIN, VITAMIN B-6, CALCIUM, IRON, ZINC, COPPER 1 TABLET: 4000; 120; 400; 22; 1.84; 3; 20; 10; 1; 12; 200; 27; 25; 2 TABLET ORAL at 08:19

## 2018-09-12 ASSESSMENT — ACTIVITIES OF DAILY LIVING (ADL)
LAUNDRY: UNABLE TO COMPLETE
ORAL_HYGIENE: INDEPENDENT
DRESS: SCRUBS (BEHAVIORAL HEALTH);INDEPENDENT
GROOMING: INDEPENDENT

## 2018-09-12 NOTE — PLAN OF CARE
Face to face continued care completed by Sarah Nascimento RN. Rounding completed. Patient observed in the lounge socializing with peers.     Laura Nascimento  9/12/2018  08:06 AM

## 2018-09-12 NOTE — PLAN OF CARE
"Problem: Patient Care Overview  Goal: Individualization & Mutuality  Patient will be compliant with treatment team recommendations.  Patient will report at least 6-8 hours of sleep each night.    8/21/18 Patient may keep wet wipes in her bathroom per patient care order for proper hygiene after chemo treatments.  Patient will attend groups.   Patient is unable to have a second scrub jacket as patient has been using jackets inappropriately.    Outcome: No Change  Pt was sleeping at the start of the shift but woke up by midnight. Pt complained of anxiety rated 5/10, depression 10/10 and generalized pain at 6/10. Pt was at the nursing station every few minutes with multiple requests such as juice, george gum, ginger ale, alpha stem, bananas. Nursing sat with pt and had her do some coping skills for her anxiety. Pt was able to journal, do brayan grounding and listened to music with the headphones. While pt was at nursing station, a peer came to the nursing station, pt stated \"can you move back? I am having boundry issues\".  Pt did state that she felt that she was \"triggering someone that was in return triggering her memories\". Pt did agree to take a PRN for anxiety and sleep at 0104 (hydroxyzine 50mg and risperidone 1mg). Pt only wanted 50mg instead of 100mg stating that she needed to be up in the morning to get cigarettes from a peer so she can smoke on her way home. Pt slept for 2 hours and stayed up socializing with peer. Pt complained of an upset stomach and requested Mylanta at 0532.       "

## 2018-09-12 NOTE — PLAN OF CARE
Problem: Patient Care Overview  Goal: Discharge Needs Assessment  Outcome: Adequate for Discharge Date Met: 09/12/18  Pt is discharging at the recommendation of the treatment team. Pt is discharging to Home transported by . Pt denies having any thoughts of hurting themself or anyone else. Pt denies anxiety or depression. Pt has follow up with [facility], [facility], and [facility]. Discharge instructions, including; demographic sheet, psychiatric evaluation, discharge summary, and AVS were faxed to these next level of care providers.

## 2018-09-12 NOTE — PLAN OF CARE
Problem: Patient Care Overview  Goal: Individualization & Mutuality  Patient will be compliant with treatment team recommendations.  Patient will report at least 6-8 hours of sleep each night.    8/21/18 Patient may keep wet wipes in her bathroom per patient care order for proper hygiene after chemo treatments.  Patient will attend groups.   Patient is unable to have a second scrub jacket as patient has been using jackets inappropriately.    Outcome: Improving  Pt was up and active during the beginning of the shift. She is struggling with staying in the present and not obsessing about her discharge and tasks tomorrow.  She reports enough anxiety that she feels ill about it and is struggling. She wrote in her journal, listened to music, and stayed in her room to reduce stimulation. She is trying very hard to let go of things she cannot control.  Pt received PRN Ativan after supper.    Problem: Coping, Compromised Individual (Adult,Obstetrics,Pediatric)  Goal: Effective Coping  Patient will apply her coping skills, actively deal with grief issues, and talk with nursing staff about her issues in an appropriate manner.   Outcome: Improving  Pt has talked with staff, taken appropriate PRN's, and used her coping skills

## 2018-09-12 NOTE — PLAN OF CARE
Face to face end of shift report received from Chuy MOYA RN. Rounding completed. Patient observed in her bedroom lying on her bed, eyes closed.     Laura Nascimento  9/11/2018  11:38 PM

## 2018-09-12 NOTE — PLAN OF CARE
Problem: Patient Care Overview  Goal: Individualization & Mutuality  Patient will be compliant with treatment team recommendations.  Patient will report at least 6-8 hours of sleep each night.    8/21/18 Patient may keep wet wipes in her bathroom per patient care order for proper hygiene after chemo treatments.  Patient will attend groups.   Patient is unable to have a second scrub jacket as patient has been using jackets inappropriately.    Outcome: No Change  Pt has been up most of the night and all of this shift. Pt was agitated this morning when the nursing students got here, pt wanted her medications and her nicotine gum right away and was upset when she had to wait. Pt denied depression, suicidal and homicidal ideation, and pain. Pt did report anxiety at an 8/10. Pt requested multiple PRNs for anxiety including george gum at 0837 and 1046, hydroxyzine 50mg at 1046, alpha stem at 1100, ativian 1.5mg, risperidone 1mg at 1130 and was ok'd to take her gabapentin 600mg at 1130. Pt was discharged at 1141.     Problem: Coping, Compromised Individual (Adult,Obstetrics,Pediatric)  Goal: Effective Coping  Patient will apply her coping skills, actively deal with grief issues, and talk with nursing staff about her issues in an appropriate manner.   Outcome: Improving  Pt attempted to use coping skills to decrease her anxiety this shift, pt still rated anxiety as high.       Discharge Note    Patient Discharged to home on 9/12/2018 11:41 PM via Private Car and No transportation concerns accompanied by [facility].     Patient informed of discharge instructions in AVS. patient verbalizes understanding and denies having any questions pertaining to AVS. Patient stable at time of discharge. Patient denies SI, HI, and thoughts of self harm at time of discharge. All personal belongings returned to patient. Discharge prescriptions sent to [facility] in [place] via electronic communication. Psych evaluation,  history and physical, AVS, and discharge summary faxed to next level of care by .     Laura Nascimento  9/12/2018  11:41 PM

## 2018-11-08 ENCOUNTER — TRANSFERRED RECORDS (OUTPATIENT)
Dept: HEALTH INFORMATION MANAGEMENT | Facility: CLINIC | Age: 28
End: 2018-11-08

## 2018-12-05 NOTE — PLAN OF CARE
Problem: Patient Care Overview  Goal: Individualization & Mutuality  Patient will be compliant with treatment team recommendations.  Patient will report at least 6-8 hours of sleep each night.    8/21/18 Patient may keep wet wipes in her bathroom per patient care order for proper hygiene after chemo treatments.  Patient will attend groups.   Patient is unable to have a second scrub jacket as patient has been using jackets inappropriately.    Pt is irritable.  She states she did not have a good morning but is trying to keep her anger in control.  Pt admits to some high anxiety and some depression.  Denies all other criteria.  Has been in room at begining of shift, states that the other pts are annoying her today and she is staying away from them.  Did use PRN alpha stim  And coloring for anxiety.  Pt requested PRN gabapentin 300 mg at 1900.  Pt later reported effective.  Has been cooperative with all medications this shift.       Problem: Coping, Compromised Individual (Adult,Obstetrics,Pediatric)  Goal: Effective Coping  Patient will apply her coping skills, actively deal with grief issues, and talk with nursing staff about her issues in an appropriate manner.   Pt has been using her coping skills.        2 person assist

## 2019-04-05 ENCOUNTER — TRANSFERRED RECORDS (OUTPATIENT)
Dept: HEALTH INFORMATION MANAGEMENT | Facility: CLINIC | Age: 29
End: 2019-04-05

## 2019-08-07 ENCOUNTER — TRANSFERRED RECORDS (OUTPATIENT)
Dept: HEALTH INFORMATION MANAGEMENT | Facility: CLINIC | Age: 29
End: 2019-08-07

## 2019-08-09 ENCOUNTER — TRANSFERRED RECORDS (OUTPATIENT)
Dept: HEALTH INFORMATION MANAGEMENT | Facility: CLINIC | Age: 29
End: 2019-08-09

## 2019-08-12 NOTE — PLAN OF CARE
Problem: Patient Care Overview  Goal: Individualization & Mutuality  Patient will be compliant with treatment team recommendations.  Patient will report at least 6-8 hours of sleep each night.    8/21/18 Patient may keep wet wipes in her bathroom per patient care order for proper hygiene after chemo treatments.  Patient will attend groups.   Patient is unable to have a second scrub jacket as patient has been using jackets inappropriately.    Outcome: Improving  Pt was up and active early in the shift. She attended group before supper.  She did complain of anxiety and asked for PRN's.  She was given Ativan 1.5 mg and Risperdal 1 mg. She took a nap afterwards until snack time.  Pt told me that she is somewhat anxious about discharge and having to pack up all her stuff. She explained that she is trying to stay centered and in the present.  She took her bedtime medications and fell back asleep.    Problem: Coping, Compromised Individual (Adult,Obstetrics,Pediatric)  Goal: Effective Coping  Patient will apply her coping skills, actively deal with grief issues, and talk with nursing staff about her issues in an appropriate manner.   Outcome: Improving  Pt used her coping skills, asked for PRN appropriately, and talked with staff       inpatient Medical/Surgical team

## 2019-09-24 ENCOUNTER — TRANSFERRED RECORDS (OUTPATIENT)
Dept: HEALTH INFORMATION MANAGEMENT | Facility: CLINIC | Age: 29
End: 2019-09-24

## 2019-09-28 NOTE — PLAN OF CARE
Face to face end of shift report received from Ny RN Rounding completed. Patient observed.     Emma Jaimes  9/9/2018  0800 AM         CHEST PAIN/L arm pain, mild numbness/tingling.

## 2019-10-21 ENCOUNTER — TRANSFERRED RECORDS (OUTPATIENT)
Dept: HEALTH INFORMATION MANAGEMENT | Facility: CLINIC | Age: 29
End: 2019-10-21

## 2019-11-06 ENCOUNTER — DOCUMENTATION ONLY (OUTPATIENT)
Dept: ONCOLOGY | Facility: OTHER | Age: 29
End: 2019-11-06

## 2019-11-07 ENCOUNTER — PATIENT OUTREACH (OUTPATIENT)
Dept: ONCOLOGY | Facility: OTHER | Age: 29
End: 2019-11-07

## 2019-11-07 NOTE — TELEPHONE ENCOUNTER
Called and spoke to patient, she stated she is not moving down to [place] as originally planned, and wanted all of her appointments here cancelled.  Noted in chart, sent to care coordinator as adi.

## 2019-11-07 NOTE — PROGRESS NOTES
LM to call back.Will confirm no appt needed with call back.    Mariposa Pierce , RN   Oncology Care Coordinator

## 2020-04-28 ENCOUNTER — TRANSFERRED RECORDS (OUTPATIENT)
Dept: HEALTH INFORMATION MANAGEMENT | Facility: CLINIC | Age: 30
End: 2020-04-28

## 2020-05-02 NOTE — PLAN OF CARE
Face to face end of shift report received from Chuy MOYA RN. Rounding completed. Patient observed in room laying in bed.     Mary Velasquez  9/2/2018  3:58 PM         Reconstitution Date (Optional): 5/1/2020

## 2022-03-29 NOTE — PROGRESS NOTES
09/07/18 1958   Patient Belongings   Did you bring any home meds/supplements to the hospital?  No   Patient Belongings other (see comments)  (Julio figurine)   Disposition of Belongings Other (see comment)  (PT cubby in belongings room)   Belongings Search Yes   Clothing Search Yes   Second Staff Sarai RN    List items sent to safe: N/A  All other belongings put in assigned cubby in belongings room.     I have reviewed my belongings list on admission and verify that it is correct.     Patient signature_______________________________    Second staff witness (if patient unable to sign) ______________________________       I have received all my belongings at discharge.    Patient signature________________________________    Job 9/7/2018  8:00 PM         2
